# Patient Record
Sex: FEMALE | Race: WHITE
[De-identification: names, ages, dates, MRNs, and addresses within clinical notes are randomized per-mention and may not be internally consistent; named-entity substitution may affect disease eponyms.]

---

## 2019-06-01 ENCOUNTER — HOSPITAL ENCOUNTER (EMERGENCY)
Dept: HOSPITAL 25 - ED | Age: 74
LOS: 1 days | Discharge: HOME | End: 2019-06-02
Payer: MEDICARE

## 2019-06-01 DIAGNOSIS — I71.9: ICD-10-CM

## 2019-06-01 DIAGNOSIS — K57.90: ICD-10-CM

## 2019-06-01 DIAGNOSIS — Z88.2: ICD-10-CM

## 2019-06-01 DIAGNOSIS — Z88.3: ICD-10-CM

## 2019-06-01 DIAGNOSIS — Z88.0: ICD-10-CM

## 2019-06-01 DIAGNOSIS — J43.9: ICD-10-CM

## 2019-06-01 DIAGNOSIS — Z87.891: ICD-10-CM

## 2019-06-01 DIAGNOSIS — G89.29: Primary | ICD-10-CM

## 2019-06-01 DIAGNOSIS — E78.00: ICD-10-CM

## 2019-06-01 LAB
ALBUMIN SERPL BCG-MCNC: 3.8 G/DL (ref 3.2–5.2)
ALBUMIN/GLOB SERPL: 1.8 {RATIO} (ref 1–3)
ALP SERPL-CCNC: 74 U/L (ref 34–104)
ALT SERPL W P-5'-P-CCNC: 18 U/L (ref 7–52)
ANION GAP SERPL CALC-SCNC: 6 MMOL/L (ref 2–11)
APTT PPP: 49 SECONDS (ref 26–38)
AST SERPL-CCNC: 16 U/L (ref 13–39)
BASOPHILS # BLD AUTO: 0.1 10^3/UL (ref 0–0.2)
BUN SERPL-MCNC: 20 MG/DL (ref 6–24)
BUN/CREAT SERPL: 25.6 (ref 8–20)
CALCIUM SERPL-MCNC: 9 MG/DL (ref 8.6–10.3)
CHLORIDE SERPL-SCNC: 104 MMOL/L (ref 101–111)
EOSINOPHIL # BLD AUTO: 0.3 10^3/UL (ref 0–0.6)
GLOBULIN SER CALC-MCNC: 2.1 G/DL (ref 2–4)
GLUCOSE SERPL-MCNC: 102 MG/DL (ref 70–100)
HCO3 SERPL-SCNC: 28 MMOL/L (ref 22–32)
HCT VFR BLD AUTO: 36 % (ref 35–47)
HGB BLD-MCNC: 11.9 G/DL (ref 12–16)
INR PPP/BLD: 1.11 (ref 0.82–1.09)
LYMPHOCYTES # BLD AUTO: 1.6 10^3/UL (ref 1–4.8)
MCH RBC QN AUTO: 30 PG (ref 27–31)
MCHC RBC AUTO-ENTMCNC: 34 G/DL (ref 31–36)
MCV RBC AUTO: 89 FL (ref 80–97)
MONOCYTES # BLD AUTO: 0.8 10^3/UL (ref 0–0.8)
NEUTROPHILS # BLD AUTO: 4.1 10^3/UL (ref 1.5–7.7)
NRBC # BLD AUTO: 0 10^3/UL
NRBC BLD QL AUTO: 0.1
PLATELET # BLD AUTO: 255 10^3/UL (ref 150–450)
POTASSIUM SERPL-SCNC: 4.3 MMOL/L (ref 3.5–5)
PROT SERPL-MCNC: 5.9 G/DL (ref 6.4–8.9)
RBC # BLD AUTO: 4.02 10^6 /UL (ref 3.7–4.87)
SODIUM SERPL-SCNC: 138 MMOL/L (ref 135–145)
TROPONIN I SERPL-MCNC: 0 NG/ML (ref ?–0.04)
WBC # BLD AUTO: 6.8 10^3/UL (ref 3.5–10.8)

## 2019-06-01 PROCEDURE — 85025 COMPLETE CBC W/AUTO DIFF WBC: CPT

## 2019-06-01 PROCEDURE — 84484 ASSAY OF TROPONIN QUANT: CPT

## 2019-06-01 PROCEDURE — 85730 THROMBOPLASTIN TIME PARTIAL: CPT

## 2019-06-01 PROCEDURE — 99284 EMERGENCY DEPT VISIT MOD MDM: CPT

## 2019-06-01 PROCEDURE — 83690 ASSAY OF LIPASE: CPT

## 2019-06-01 PROCEDURE — 36415 COLL VENOUS BLD VENIPUNCTURE: CPT

## 2019-06-01 PROCEDURE — 80053 COMPREHEN METABOLIC PANEL: CPT

## 2019-06-01 PROCEDURE — 71275 CT ANGIOGRAPHY CHEST: CPT

## 2019-06-01 PROCEDURE — 85610 PROTHROMBIN TIME: CPT

## 2019-06-01 PROCEDURE — 83605 ASSAY OF LACTIC ACID: CPT

## 2019-06-01 PROCEDURE — 74174 CTA ABD&PLVS W/CONTRAST: CPT

## 2019-06-01 NOTE — ED
Progress





- Progress Note


Progress Note: 


This patient is a sign-out from Dr. Gregory to Dr. Jorge at 2000 on 6/1/19 at 

shift change pending CTA chest/A/P and disposition.





Course/Dx





- Course


Course Of Treatment: This patient is a sign-out from Dr. Gregory to Dr. Jorge at 

2000 on 6/1/19 at shift change pending CTA chest/A/P and disposition.  CT chest 

revealed 1. No aortic dissection, aneurysm, or rupture. 2. No pulmonary emboli. 

Findings which in the proper clinical setting can be seen in pulmonary 

hypertension. 3. Mild emphysema with lower lobe findings of UIP or NSIP pattern 

interstitial lung disease. Dr. Jorge has reviewed this radiology report. CT A/

P revealed 1. Infrarenal fusiform aortic aneurysm. No rupture. 2. Distal 

colonic diverticulosis. Dr. Jorge has reviewed this radiology report.  Patient 

will be discharged home with dx of chronic pain. Patient understands and agrees 

with this plan.





- Diagnoses


Provider Diagnoses: 


 Chronic pain








Discharge





- Sign-Out/Discharge


Documenting (check all that apply): Patient Departure - Discharge


Patient Received Moderate/Deep Sedation with Procedure: No





- Discharge Plan


Condition: Stable


Disposition: HOME


Patient Education Materials:  Chronic Pain (ED)


Referrals: 


Lucy Felix MD [Primary Care Provider] - 


Additional Instructions: 


Your CT scan did not show an aneurysm or anything worrisome as a cause of your 

pain. You are on a number of pain medications already, so I am reluctant to 

intervene in your regimen.





- Billing Disposition and Condition


Condition: STABLE


Disposition: Home





- Attestation Statements


Document Initiated by Aster: Yes


Documenting Scribe: William Muse


Provider For Whom Aster is Documenting (Include Credential): Russell Jorge MD


Scribgiovani Attestation: 


William DODGE, scribed for Russell Jorge MD on 06/03/19 at 0222. 


Scribe Documentation Reviewed: Yes


Provider Attestation: 


The documentation as recorded by the William alonzo accurately reflects 

the service I personally performed and the decisions made by me, Russell Jorge MD


Status of Scrtoñito Document: Viewed

## 2019-06-01 NOTE — ED
Back Pain





- HPI Summary


HPI Summary: 





This patient is a 74 year old F brought in by ambulance to [facility] from [facility] living with a chief complaint of progressively worsening thoracic and 

lumber back pain for the past two weeks that differs from her chronic back 

pain. Patient additionally reports dizziness. She reports three falls in the 

past two months. She reports chronic back pain from a previous spinal surgery 

in August of 2018. She is currently seen  the pain clinic. She additionally 

reports a history of Abdominal Aortic Aneurysm. Reports previous bowel 

obstruction. 





- History of Current Complaint


Chief Complaint: EDBackInjuryPain


Stated Complaint: BACK PAIN PER EMS


Time Seen by Provider: 06/01/19 19:50


Hx Obtained From: Patient


Onset/Duration: Gradual Onset, Lasting Weeks


Onset/Duration: Started Weeks Ago


Timing: Constant


Back Pain Location: Is Diffuse - thoracic and lumbar pain


Pain Intensity: 10


Pain Scale Used: 0-10 Numeric


Associated Signs And Symptoms: Positive: Other - dizziness





- Allergies/Home Medications


Allergies/Adverse Reactions: 


 Allergies











Allergy/AdvReac Type Severity Reaction Status Date / Time


 


cephalexin Allergy  Unknown Verified 06/01/19 20:09





   Reaction  





   Details  


 


Penicillins Allergy  Unknown Verified 06/01/19 20:09





   Reaction  





   Details  


 


Sulfa (Sulfonamide Allergy  Unknown Verified 06/01/19 20:09





Antibiotics)   Reaction  





   Details  














PMH/Surg Hx/FS Hx/Imm Hx


Cardiovascular History: Reports: Hx Hypercholesterolemia, Other Cardiovascular 

Problems/Disorders - AAA per patient


Musculoskeletal History: Reports: Hx Arthritis - R.A., Hx Back Problems





- Surgical History


Surgery Procedure, Year, and Place: Back surgery x2


Infectious Disease History: No


Infectious Disease History: 


   Denies: Traveled Outside the US in Last 30 Days





- Family History


Known Family History: 


   Negative: Seizure Disorder





- Social History


Alcohol Use: None


Substance Use Type: Reports: None


Smoking Status (MU): Former Smoker





Review of Systems


Positive: Myalgia


Positive: Other - dizziness


All Other Systems Reviewed And Are Negative: Yes





Physical Exam





- Summary


Physical Exam Summary: 





GENERAL: Patient is a well-developed and nourished F who is lying comfortable 

in the stretcher. Patient is not in any acute respiratory distress.


HEAD AND FACE: Normocephalic


EYES: PERRLA, EOMI x 2.


EARS: Hearing grossly intact.


MOUTH: Oropharynx within normal limits.


NECK: Supple, trachea is midline, no adenopathy, no JVD, no carotid bruit.


CHEST: Symmetric, no tenderness at palpation


LUNGS: Clear to auscultation bilaterally. No wheezing or crackles.


CVS: Regular rate and rhythm, S1 and S2 present, no murmurs or gallops 

appreciated.


ABDOMEN: Soft,  Tenderness to palpation diffusely,  no rebound or guarding . 

Bowel sounds are normal. No abnormal abdominal pulsations.


EXTREMITIES: Full ROM in all major joints, no edema, no cyanosis or clubbing.


NEURO: Alert and oriented x 3. No acute neurological deficits. Speech is normal 

and follows commands.


SKIN: Dry and warm





Triage Information Reviewed: Yes


Vital Signs On Initial Exam: 


 Initial Vitals











Temp Pulse Resp BP Pulse Ox


 


 98.6 F   77   16   117/80   92 


 


 06/01/19 19:43  06/01/19 19:43  06/01/19 19:43  06/01/19 19:43  06/01/19 19:43











Vital Signs Reviewed: Yes





Diagnostics





- Vital Signs


 Vital Signs











  Temp Pulse Resp BP Pulse Ox


 


 06/01/19 19:43  98.6 F  77  16  117/80  92














- Laboratory


Result Diagrams: 


 06/01/19 20:17





 06/01/19 20:17


Lab Statement: Any lab studies that have been ordered have been reviewed, and 

results considered in the medical decision making process.





Back Pain Course/Dx





- Course


Course Of Treatment: 74 year old F brought in by ambulance to [facility] from 

[facility] with a chief complaint of progressively worsening 

thoracic and lumber back pain for the past two weeks that differs from her 

chronic back pain. Patient additionally reports dizziness. She additionally 

reports a history of Abdominal Aortic Aneurysm. Patient is given IV fluids. 

Bloodwork obtained. Patient is signed out to Dr. Jorge pending Chest/A/P CT.





- Diagnoses


Provider Diagnoses: 


 Chronic pain








Discharge





- Sign-Out/Discharge


Documenting (check all that apply): Sign-Out Patient


Signing out patient TO: Russell Jorge - CT A/P


Patient Received Moderate/Deep Sedation with Procedure: No





- Discharge Plan


Condition: Stable


Disposition: HOME


Patient Education Materials:  Chronic Pain (ED)


Referrals: 


Lucy Felix MD [Primary Care Provider] - 


Additional Instructions: 


Your CT scan did not show an aneurysm or anything worrisome as a cause of your 

pain. You are on a number of pain medications already, so I am reluctant to 

intervene in your regimen.





- Billing Disposition and Condition


Condition: STABLE


Disposition: Home





- Attestation Statements


Document Initiated by Scribe: Yes


Documenting Scribe: Gabrielle Angeles


Provider For Whom Scribe is Documenting (Include Credential): Mary Gregory MD


Scribe Attestation: 


IGabrielle, scribed for Mary Gregory MD on 06/04/19 at 1249. 


Scribe Documentation Reviewed: Yes


Provider Attestation: 


The documentation as recorded by the Gabrielle alonzo accurately reflects 

the service I personally performed and the decisions made by me, Mary Gregory MD


Status of Scribe Document: Viewed

## 2019-06-02 VITALS — DIASTOLIC BLOOD PRESSURE: 71 MMHG | SYSTOLIC BLOOD PRESSURE: 134 MMHG

## 2019-07-28 ENCOUNTER — HOSPITAL ENCOUNTER (INPATIENT)
Dept: HOSPITAL 25 - ED | Age: 74
LOS: 6 days | Discharge: HOME | DRG: 193 | End: 2019-08-03
Attending: INTERNAL MEDICINE | Admitting: HOSPITALIST
Payer: MEDICARE

## 2019-07-28 DIAGNOSIS — Z93.3: ICD-10-CM

## 2019-07-28 DIAGNOSIS — J44.9: ICD-10-CM

## 2019-07-28 DIAGNOSIS — I95.9: ICD-10-CM

## 2019-07-28 DIAGNOSIS — M06.9: ICD-10-CM

## 2019-07-28 DIAGNOSIS — I25.10: ICD-10-CM

## 2019-07-28 DIAGNOSIS — M25.50: ICD-10-CM

## 2019-07-28 DIAGNOSIS — I25.2: ICD-10-CM

## 2019-07-28 DIAGNOSIS — E66.9: ICD-10-CM

## 2019-07-28 DIAGNOSIS — Z79.899: ICD-10-CM

## 2019-07-28 DIAGNOSIS — Z87.891: ICD-10-CM

## 2019-07-28 DIAGNOSIS — E03.9: ICD-10-CM

## 2019-07-28 DIAGNOSIS — J18.1: Primary | ICD-10-CM

## 2019-07-28 DIAGNOSIS — Z88.0: ICD-10-CM

## 2019-07-28 DIAGNOSIS — R01.1: ICD-10-CM

## 2019-07-28 DIAGNOSIS — I42.1: ICD-10-CM

## 2019-07-28 DIAGNOSIS — F32.9: ICD-10-CM

## 2019-07-28 DIAGNOSIS — M19.90: ICD-10-CM

## 2019-07-28 DIAGNOSIS — G89.29: ICD-10-CM

## 2019-07-28 DIAGNOSIS — Z82.49: ICD-10-CM

## 2019-07-28 DIAGNOSIS — Z88.8: ICD-10-CM

## 2019-07-28 DIAGNOSIS — I10: ICD-10-CM

## 2019-07-28 DIAGNOSIS — Z88.2: ICD-10-CM

## 2019-07-28 DIAGNOSIS — J96.01: ICD-10-CM

## 2019-07-28 DIAGNOSIS — F41.9: ICD-10-CM

## 2019-07-28 LAB
BASOPHILS # BLD AUTO: 0.1 10^3/UL (ref 0–0.2)
EOSINOPHIL # BLD AUTO: 0.1 10^3/UL (ref 0–0.6)
HCT VFR BLD AUTO: 34 % (ref 35–47)
HGB BLD-MCNC: 10.8 G/DL (ref 12–16)
INR PPP/BLD: 1.05 (ref 0.82–1.09)
LYMPHOCYTES # BLD AUTO: 0.6 10^3/UL (ref 1–4.8)
MCH RBC QN AUTO: 27 PG (ref 27–31)
MCHC RBC AUTO-ENTMCNC: 32 G/DL (ref 31–36)
MCV RBC AUTO: 82 FL (ref 80–97)
MONOCYTES # BLD AUTO: 0.6 10^3/UL (ref 0–0.8)
NEUTROPHILS # BLD AUTO: 7.7 10^3/UL (ref 1.5–7.7)
NRBC # BLD AUTO: 0 10^3/UL
NRBC BLD QL AUTO: 0
PLATELET # BLD AUTO: 238 10^3/UL (ref 150–450)
RBC # BLD AUTO: 4.07 10^6 /UL (ref 3.7–4.87)
WBC # BLD AUTO: 9.1 10^3/UL (ref 3.5–10.8)

## 2019-07-28 PROCEDURE — 87641 MR-STAPH DNA AMP PROBE: CPT

## 2019-07-28 PROCEDURE — 83605 ASSAY OF LACTIC ACID: CPT

## 2019-07-28 PROCEDURE — 80048 BASIC METABOLIC PNL TOTAL CA: CPT

## 2019-07-28 PROCEDURE — 93005 ELECTROCARDIOGRAM TRACING: CPT

## 2019-07-28 PROCEDURE — 99285 EMERGENCY DEPT VISIT HI MDM: CPT

## 2019-07-28 PROCEDURE — 82803 BLOOD GASES ANY COMBINATION: CPT

## 2019-07-28 PROCEDURE — 71046 X-RAY EXAM CHEST 2 VIEWS: CPT

## 2019-07-28 PROCEDURE — 87899 AGENT NOS ASSAY W/OPTIC: CPT

## 2019-07-28 PROCEDURE — 36415 COLL VENOUS BLD VENIPUNCTURE: CPT

## 2019-07-28 PROCEDURE — 80053 COMPREHEN METABOLIC PANEL: CPT

## 2019-07-28 PROCEDURE — 71275 CT ANGIOGRAPHY CHEST: CPT

## 2019-07-28 PROCEDURE — 86140 C-REACTIVE PROTEIN: CPT

## 2019-07-28 PROCEDURE — 85025 COMPLETE CBC W/AUTO DIFF WBC: CPT

## 2019-07-28 PROCEDURE — 84484 ASSAY OF TROPONIN QUANT: CPT

## 2019-07-28 PROCEDURE — 85610 PROTHROMBIN TIME: CPT

## 2019-07-28 PROCEDURE — 87040 BLOOD CULTURE FOR BACTERIA: CPT

## 2019-07-29 LAB
ALBUMIN SERPL BCG-MCNC: 3.7 G/DL (ref 3.2–5.2)
ALBUMIN/GLOB SERPL: 1.5 {RATIO} (ref 1–3)
ALP SERPL-CCNC: 99 U/L (ref 34–104)
ALT SERPL W P-5'-P-CCNC: 13 U/L (ref 7–52)
ANION GAP SERPL CALC-SCNC: 11 MMOL/L (ref 2–11)
ANION GAP SERPL CALC-SCNC: 6 MMOL/L (ref 2–11)
AST SERPL-CCNC: 15 U/L (ref 13–39)
BASOPHILS # BLD AUTO: 0.1 10^3/UL (ref 0–0.2)
BUN SERPL-MCNC: 22 MG/DL (ref 6–24)
BUN SERPL-MCNC: 24 MG/DL (ref 6–24)
BUN/CREAT SERPL: 26.2 (ref 8–20)
BUN/CREAT SERPL: 27 (ref 8–20)
CALCIUM SERPL-MCNC: 8.2 MG/DL (ref 8.6–10.3)
CALCIUM SERPL-MCNC: 9.3 MG/DL (ref 8.6–10.3)
CHLORIDE SERPL-SCNC: 104 MMOL/L (ref 101–111)
CHLORIDE SERPL-SCNC: 105 MMOL/L (ref 101–111)
EOSINOPHIL # BLD AUTO: 0.1 10^3/UL (ref 0–0.6)
GLOBULIN SER CALC-MCNC: 2.4 G/DL (ref 2–4)
GLUCOSE SERPL-MCNC: 129 MG/DL (ref 70–100)
GLUCOSE SERPL-MCNC: 145 MG/DL (ref 70–100)
HCO3 SERPL-SCNC: 22 MMOL/L (ref 22–32)
HCO3 SERPL-SCNC: 23 MMOL/L (ref 22–32)
HCT VFR BLD AUTO: 29 % (ref 35–47)
HGB BLD-MCNC: 9.2 G/DL (ref 12–16)
LYMPHOCYTES # BLD AUTO: 0.5 10^3/UL (ref 1–4.8)
MCH RBC QN AUTO: 26 PG (ref 27–31)
MCHC RBC AUTO-ENTMCNC: 32 G/DL (ref 31–36)
MCV RBC AUTO: 81 FL (ref 80–97)
MONOCYTES # BLD AUTO: 0.6 10^3/UL (ref 0–0.8)
NEUTROPHILS # BLD AUTO: 8.4 10^3/UL (ref 1.5–7.7)
NRBC # BLD AUTO: 0 10^3/UL
NRBC BLD QL AUTO: 0
PLATELET # BLD AUTO: 212 10^3/UL (ref 150–450)
POTASSIUM SERPL-SCNC: 4.3 MMOL/L (ref 3.5–5)
POTASSIUM SERPL-SCNC: 4.4 MMOL/L (ref 3.5–5)
PROT SERPL-MCNC: 6.1 G/DL (ref 6.4–8.9)
RBC # BLD AUTO: 3.5 10^6 /UL (ref 3.7–4.87)
SODIUM SERPL-SCNC: 134 MMOL/L (ref 135–145)
SODIUM SERPL-SCNC: 137 MMOL/L (ref 135–145)
WBC # BLD AUTO: 9.6 10^3/UL (ref 3.5–10.8)

## 2019-07-29 RX ADMIN — GABAPENTIN SCH MG: 300 CAPSULE ORAL at 20:16

## 2019-07-29 RX ADMIN — FAMOTIDINE SCH MG: 20 TABLET, FILM COATED ORAL at 20:16

## 2019-07-29 RX ADMIN — LEVOTHYROXINE SODIUM SCH MCG: 112 TABLET ORAL at 05:09

## 2019-07-29 RX ADMIN — WATER SCH NOTE: 100 INJECTION, SOLUTION INTRAVENOUS at 20:22

## 2019-07-29 RX ADMIN — DULOXETINE HYDROCHLORIDE SCH MG: 20 CAPSULE, DELAYED RELEASE ORAL at 10:03

## 2019-07-29 RX ADMIN — POTASSIUM CHLORIDE SCH MEQ: 750 TABLET, FILM COATED, EXTENDED RELEASE ORAL at 20:16

## 2019-07-29 RX ADMIN — Medication SCH TAB: at 20:16

## 2019-07-29 RX ADMIN — ATORVASTATIN CALCIUM SCH MG: 40 TABLET, FILM COATED ORAL at 10:03

## 2019-07-29 RX ADMIN — PANTOPRAZOLE SODIUM SCH MG: 40 TABLET, DELAYED RELEASE ORAL at 10:02

## 2019-07-29 RX ADMIN — GABAPENTIN SCH MG: 300 CAPSULE ORAL at 14:56

## 2019-07-29 RX ADMIN — METOPROLOL TARTRATE SCH MG: 25 TABLET, FILM COATED ORAL at 20:16

## 2019-07-29 RX ADMIN — ACETAMINOPHEN PRN MG: 325 TABLET ORAL at 14:57

## 2019-07-29 RX ADMIN — POTASSIUM CHLORIDE SCH MEQ: 750 TABLET, FILM COATED, EXTENDED RELEASE ORAL at 10:03

## 2019-07-29 RX ADMIN — Medication SCH TAB: at 10:02

## 2019-07-29 RX ADMIN — ASPIRIN SCH MG: 81 TABLET, COATED ORAL at 10:03

## 2019-07-29 RX ADMIN — LIDOCAINE SCH PATCH: 50 PATCH CUTANEOUS at 10:02

## 2019-07-29 RX ADMIN — FOLIC ACID SCH MG: 1 TABLET ORAL at 10:02

## 2019-07-29 RX ADMIN — MENTHOL, METHYL SALICYLATE SCH APPLIC: 10; 15 CREAM TOPICAL at 11:25

## 2019-07-29 RX ADMIN — GABAPENTIN SCH MG: 300 CAPSULE ORAL at 10:03

## 2019-07-29 RX ADMIN — HEPARIN SODIUM SCH UNITS: 5000 INJECTION INTRAVENOUS; SUBCUTANEOUS at 20:23

## 2019-07-29 RX ADMIN — MENTHOL, METHYL SALICYLATE SCH: 10; 15 CREAM TOPICAL at 12:26

## 2019-07-29 RX ADMIN — HYDROXYCHLOROQUINE SULFATE SCH MG: 200 TABLET, FILM COATED ORAL at 10:03

## 2019-07-29 NOTE — HP
CC:  Dr. Felix; Dr. Wolfe

 

HISTORY AND PHYSICAL:

 

DATE OF ADMISSION:  19

 

TIME OF EVALUATION:  12:10 a.m.

 

PRIMARY CARE PROVIDER:  Dr. Felix.

 

RHEUMATOLOGIST:  Dr. Wolfe.

 

CHIEF COMPLAINT:  "I was coughing up blood."

 

HISTORY OF PRESENT ILLNESS:  Mrs. Gage is a 74-year-old lady with a past 
medical history of rheumatoid arthritis; osteoarthritis; coronary artery disease
, status post MI; hypertension; hypothyroidism, who presents to the emergency 
room with complaints of worsening cough and yellowish sputum mixed with blood.

 

The patient states she was in her usual state of health until two weeks ago 
when she started to experience dry cough.  She states "everyone at [place] is 
sick and coughing up everywhere."  She states that initially she had only a dry 
cough and was not taking any medications for it.  Over the past couple of days, 
she started to have some malaise, some weakness and today this cough became 
productive with yellow/greenish sputum mixed with blood.  She states that this 
has happened to her in the past when she was in [place] and she was diagnosed 
with pneumonia and treated with antibiotics.

 

She denies chest pain, palpitations, shortness of breath, nausea, vomiting, 
diarrhea.  Although, she denies shortness of breath in the emergency room, she 
did have an oxygen saturation in the high 80s and required supplemental oxygen.

 

PAST MEDICAL HISTORY:

1.  Rheumatoid arthritis.

2.  Osteoarthritis.

3.  Coronary artery disease, status post MI.

4.  Hypothyroidism.

5.  Hypertension.

 

ALLERGIES:  CEPHALEXIN, PENICILLIN, and SULFA, the patient has hives and 
shortness of breath.

 

FAMILY HISTORY:  Both parents had a history of heart disease.  Her father had 
bypass and pacemaker and both  in their 80s.

 

SOCIAL HISTORY:  The patient was a smoker from age 15 to 68 of half a pack a 
day. She says that then she smoked small cigars, but she has completely quit 
for the past 6 years.  She denies any history of alcohol or drug use.  She used 
to live in [place], but then she moved to [place] to be near her son.  Her son 
is her surrogate decision maker, Robert Gage, phone number is [phone].

 

REVIEW OF SYSTEMS:  A 14-point review of systems was performed and all the 
pertinent negatives are in the HPI.

 

                               PHYSICAL EXAMINATION

 

GENERAL:  An obese elderly lady, lying in the ED stretcher, in no acute 
distress.

 

VITAL SIGNS:  Temperature 98.7, heart rate is 88, respiratory rate is 17, 
oxygen saturation is 92% on 2 L of nasal cannula, blood pressure is 97/54.

 

HEENT:  Pupils are equal.  Moist mucous membranes.

 

CHEST:  Breath sounds present bilaterally with crackles on the right.

 

CVS:  Normal S1, S2.

 

ABDOMEN:  Obese, soft, nontender, nondistended.  Bowel sounds are present.

 

EXTREMITIES:  No edema.

 

NEURO:  She is alert, awake and oriented x3.  Able to move all four extremities.

 

 DIAGNOSTIC STUDIES/LAB DATA:  The patient had a CBC that showed WBC of 9.1, 
hemoglobin of 10.8, hematocrit of 34, platelets of 238 with 84% neutrophils.  
INR is 1.05.  ABG showed a pH of 7.39, pCO2 of 37, pO2 of 58, bicarb of 23, 
oxygen saturation 91% on room air.  Chemistry showed sodium of 147, potassium 
4.3, chloride of 104, bicarb of 22, BUN of 24, creatinine of 0.89 with a 
glucose of 129, lactic acid is 3.1, calcium is 9.3.  LFTs were normal.  CRP is 
13.8.

 

Chest x-ray is not yet officially read.  The patient appears to have an 
infiltrate on the right mid lower lobe.

 

ASSESSMENT AND PLAN:  Mrs. Gage is a 74-year-old female with a past medical 
history of rheumatoid arthritis, osteoarthritis, coronary artery disease, 
hypertension, hypothyroidism, who presented to the emergency room with 
complaints of cough and hemoptysis, found to have community-acquired pneumonia.

 

1.  Community-acquired pneumonia.  The patient does not meet SIRS criteria at 
this time, but she clearly has an infection as demonstrated on her chest x-ray.
  She is immunosuppressed with her rheumatoid arthritis and on methotrexate as 
an outpatient what inhibits her immune response, so she may not be able to 
mount a response to meet sepsis criteria.  She had an one episode of 
hypotension with a blood pressure of 78/53 in the emergency room and I believe 
this is probably secondary to a misread on the machine as shortly after she had 
a blood pressure of 103/60.  Her lactic acid is elevated at 3.1, so she will 
receive her fluid bolus and we will monitor her vital signs.  She states that 
she had shortness of breath and the rash with cephalosporins and penicillin, so 
she will be continued on levofloxacin at this time.  I will send Legionella and 
Pneumococcal antigen.  Blood cultures were sent in the emergency room.  As the 
patient has hypoxia and hemoptysis, I am also going to check a CT of the chest 
to better delineate her infiltrate and to rule out pulmonary embolism.

2.  Acute hypoxemic respiratory failure secondary to pneumonia.  The patient 
lives alone.  History of tobacco abuse.  We will continue supplemental oxygen 
and she does not appear to require any bronchodilators at this time.

3.  Hypertension.  Her blood pressure is on the lower side at this time.  We 
will continue her antihypertensives with holding parameters.

4.  Hypothyroidism.  We will continue on levothyroxine.

5.  Rheumatoid arthritis.  We will continue hydroxychloroquine, but we will 
hold methotrexate for now.

6.  DVT prophylaxis:  The patient has a score of 3 on the DVT Prophylaxis Risk 
Assessment Guide.  We will hold off on pharmacological prophylaxis for now due 
to her hemoptysis and she will have SCDs.  As her hemoptysis improve, 
pharmacological prophylaxis can be considered.

7.  Code status was discussed with the patient and she wishes to be a full 
code. TIME SPENT:  Approximately 55 minutes was spent with the patient's review
, medical records review, physical examination to complete this admission, more 
than half of this time was spent face-to-face with the patient and coordination 
of care.

 

 

 

[id number]/CPS #: [id number]

JUSTIN

## 2019-07-29 NOTE — PN
Hospitalist Progress Note


Date of Service: 07/29/19





Subjective and Objective data per resident note for which I supervised.





Attending Assessment and Plan:


74F PMH RA on MTX/Plaquenil, chronic pain on Butrans, anxeity and depression on 

benzo, HTN, hypothyroid, CAD s/p distant MI, heart murmur with recent Echo 

showing Left Ventricular Hypertrophy w DYNAMIC LVOT who presented with cough 

found to have RLL PNA, hypoxic resp failure, elevated lactic acid


Hospital course c/b mild hypotension





#CAP:


-Levaquin day 2/7-10 on 7/29/2019


-Allergy to Cephalosporins


-Continue to monitor





#Hypoxic Resp failure: New O2 requirement





#Hypotension: Ongoing, repsonsive to gentle fluids


-Dose reduce metoprolol


-DC HCTZ


-Hold Verapmil 180mg SR





#Elevated Lactic Acid, initially improved then increased with hypotension again


-Trend





#Chronic pain: Tylenol, topical, add Buprenorphine low dose oral daily to 

prevent w/ drawal from d/c patch


-Consider pred burst to help from rheum component





#RA: Continue plaquenil, reasonable to hold MTX given active infxn





#HTN: see Hypotension above





#Hypothyroid: home dose





#Anxiety/Depression: home meds





#DVT PPX: Held for hemptysis, will resume today with [facility]





#Code Status: Full

## 2019-07-29 NOTE — ED
Shortness of Breath





- HPI Summary


HPI Summary: 





Patient with history of COPD and immunosuppression complains of chills, sweats, 

productive cough, fatigue, mild SOB 1 week.  2 episodes of bright red blood 

with coughing today.  Denies fever, CP, N/V/D, abdominal pain, change in urine, 

change in BM.  Patient is from [place] in assisted living.  Denies recent 

hospitalization in the past 90 days.  EMS states patient went down to 84% O2 

sats on room air, and was placed on oxygen.  Medical history is COPD, colostomy

, greatest.





- History of Current Complaint


Chief Complaint: EDBleedingDisorder


Time Seen by Provider: 07/28/19 22:56


Hx Obtained From: Patient


Onset/Duration: Gradual Onset, Lasting Days


Current Severity: Moderate


Aggravating Factors: Movement


Alleviating Factors: Oxygen


Associated Signs & Symptoms: Cough (Productive), Cough (Bloody Sputum), Chills





- Allergy/Home Medications


Allergies/Adverse Reactions: 


 Allergies











Allergy/AdvReac Type Severity Reaction Status Date / Time


 


cephalexin Allergy  Unknown Verified 07/28/19 22:49





   Reaction  





   Details  


 


Penicillins Allergy  Unknown Verified 07/28/19 22:49





   Reaction  





   Details  


 


Sulfa (Sulfonamide Allergy  Unknown Verified 07/28/19 22:49





Antibiotics)   Reaction  





   Details  











Home Medications: 


 Home Medications





Albuterol Sulfate Hfa 2 puff INH QID PRN 07/28/19 [History Confirmed 07/28/19]











PMH/Surg Hx/FS Hx/Imm Hx


Endocrine/Hematology History: 


   Denies: Hx Diabetes


Cardiovascular History: Reports: Hx Hypercholesterolemia, Hx Hypertension, 

Other Cardiovascular Problems/Disorders - AAA per patient


Respiratory History: Reports: Hx Chronic Obstructive Pulmonary Disease (COPD)


 History: 


   Denies: Hx Renal Disease


Musculoskeletal History: Reports: Hx Arthritis - R.A., Hx Back Problems, Hx 

Osteoporosis


Sensory History: 


   Denies: Hx Legally Blind


Opthamlomology History: 


   Denies: Hx Eye Prosthesis


EENT History: 


   Denies: Hx Deafness


Neurological History: 


   Denies: Hx Developmental Delay


Psychiatric History: 


   Denies: Hx Autism





- Surgical History


Surgery Procedure, Year, and Place: Back surgery x2


Infectious Disease History: No


Infectious Disease History: 


   Denies: Traveled Outside the [place] in Last 30 Days





- Family History


Known Family History: 


   Negative: Seizure Disorder





- Social History


Alcohol Use: None


Substance Use Type: Reports: None


Smoking Status (MU): Former Smoker





Review of Systems


Positive: Chills, Fatigue


Eyes: Negative


ENT: Negative


Cardiovascular: Negative


Positive: Shortness Of Breath, Cough


Gastrointestinal: Negative


Genitourinary: Negative


Musculoskeletal: Negative


Skin: Negative


Neurological: Negative


Psychological: Normal


All Other Systems Reviewed And Are Negative: Yes





Physical Exam


Triage Information Reviewed: Yes


Vital Signs On Initial Exam: 


 Initial Vitals











Temp Pulse Resp BP Pulse Ox


 


 98 F   88   20   113/65   94 


 


 07/28/19 22:43  07/28/19 22:43  07/28/19 22:43  07/28/19 22:43  07/28/19 22:43











Vital Signs Reviewed: Yes


Appearance: Positive: Well-Appearing


Skin: Positive: Warm


Head/Face: Positive: Normal Head/Face Inspection


Eyes: Positive: Normal


ENT: Positive: Normal ENT inspection


Neck: Positive: Supple


Respiratory/Lung Sounds: Positive: Decreased Breath Sounds


Cardiovascular: Positive: RRR, Murmur


Abdomen Description: Positive: Nontender


Musculoskeletal: Positive: Normal


Neurological: Positive: Normal


Psychiatric: Positive: Normal


AVPU Assessment: Alert





- Claudia Coma Scale


Best Eye Response: 4 - Spontaneous


Best Motor Response: 6 - Obeys Commands


Best Verbal Response: 5 - Oriented


Coma Scale Total: 15





Diagnostics





- Vital Signs


 Vital Signs











  Temp Pulse Resp BP Pulse Ox


 


 07/28/19 23:19   86  17  106/63  84


 


 07/28/19 23:17   90  23   89


 


 07/28/19 23:01   88  12   91


 


 07/28/19 22:46   89   113/65  90


 


 07/28/19 22:44   92   


 


 07/28/19 22:43  98 F  88  20  113/65  94














- Laboratory


Lab Results: 


 Lab Results











  07/28/19 07/28/19 07/28/19 Range/Units





  23:35 23:37 23:37 


 


WBC   9.1   (3.5-10.8)  10^3/uL


 


RBC   4.07   (3.70-4.87)  10^6 /uL


 


Hgb   10.8 L   (12.0-16.0)  g/dL


 


Hct   34 L   (35-47)  %


 


MCV   82   (80-97)  fL


 


MCH   27   (27-31)  pg


 


MCHC   32   (31-36)  g/dL


 


RDW   17 H   (10-15)  %


 


Plt Count   238   (150-450)  10^3/uL


 


MPV   8.8   (7.4-10.4)  fL


 


Neut % (Auto)   84.6   %


 


Lymph % (Auto)   6.2   %


 


Mono % (Auto)   6.9   %


 


Eos % (Auto)   1.4   %


 


Baso % (Auto)   0.9   %


 


Absolute Neuts (auto)   7.7   (1.5-7.7)  10^3/ul


 


Absolute Lymphs (auto)   0.6 L   (1.0-4.8)  10^3/ul


 


Absolute Monos (auto)   0.6   (0-0.8)  10^3/ul


 


Absolute Eos (auto)   0.1   (0-0.6)  10^3/ul


 


Absolute Basos (auto)   0.1   (0-0.2)  10^3/ul


 


Absolute Nucleated RBC   0.0   10^3/ul


 


Nucleated RBC %   0.0   


 


INR (Anticoag Therapy)    1.05  (0.82-1.09)  


 


ABG pH  7.39    (7.35-7.45)  


 


ABG pCO2  37    (35-45)  mmHg


 


ABG pO2  58 L*    ()  mmHg


 


ABG HCO3  23.0    (19-31)  mmol/L


 


ABG O2 Saturation  91.0 L    (94.0-98.0)  %


 


ABG Base Excess  -2.2 L    (-2.0-2.0)  mmol/L











Result Diagrams: 


 07/28/19 23:37





 07/28/19 23:37


Lab Statement: Any lab studies that have been ordered have been reviewed, and 

results considered in the medical decision making process.





Course/Dx





- Course


Course Of Treatment: Patient with history of COPD and immunosuppression 

complains of chills, sweats, productive cough, fatigue, mild SOB 1 week.  2 

episodes of bright red blood with coughing today.  Denies fever, CP, N/V/D, 

abdominal pain, change in urine, change in BM.  Patient is from [place] in 

assisted living.  Denies recent hospitalization in the past 90 days.  EMS 

states patient went down to 84% O2 sats on room air, and was placed on oxygen.  

Medical history is COPD, colostomy, graves,.  87% on room air.  Vital signs 

otherwise within normal limits.  Initial BP within normal limits, later dropped 

to 90/58.  Patient started on normal saline with improvement of BP.  Hgb 10.8.  

Chest x-ray positive for right-sided infiltrate.  Started on Levaquin here in 

the ED.  Admitted to hospitalist.





- Diagnoses


Provider Diagnoses: 


 Pneumonia, Hypoxia








Discharge





- Sign-Out/Discharge


Documenting (check all that apply): Patient Departure


Patient Received Moderate/Deep Sedation with Procedure: No





- Discharge Plan


Condition: Stable


Disposition: ADMITTED TO [facility]


Referrals: 


Lucy Felix MD [Primary Care Provider] - 





- Billing Disposition and Condition


Condition: STABLE


Disposition: Admitted to [facility]

## 2019-07-29 NOTE — PN
Subjective


Date of Service: 07/29/19


Interval History: 





Patient complained of pain on her back and joints which she used to have due to 

rheumatoid arthritis. She was on pain patch before and was asking for it here. 

She complained of central chest pain on and off, lasting seconds, no palpitation

, no light headiness associated. She denied any past history of heart disease. 





Objective


Active Medications: 








Acetaminophen (Tylenol Tab*)  650 mg PO Q6H PRN


   PRN Reason: mild pain/fever>100.4


Acetaminophen/Codeine Phosphate (Tylenol/Codeine 30 Mg Tab*)  2 tab PO BID PRN


   PRN Reason: Moderate Pain


Al Hydrox/Mg Hydrox/Simethicone (Maalox Plus*)  5 ml PO Q4H PRN


   PRN Reason: HEARTBURN


Albuterol (Ventolin Hfa Inhaler*)  2 puff INH QID PRN


   PRN Reason: SHORTNESS OF BREATH


Albuterol/Ipratropium (Duoneb (Albuterol 2.5 Mg/Ipratropium 0.5 Mg))  1 neb INH 

Q4H PRN


   PRN Reason: SOB/WHEEZING


Alprazolam (Xanax Tab*)  0.25 mg PO BID PRN


   PRN Reason: ANXIETY


Aspirin (Aspirin Ec Tab*)  81 [facility]ILY [facility]


Atorvastatin Calcium (Lipitor*)  40 mg PO DAILY [facility]


Calcium/Vitamin D (Oscal D Tab 250/125*)  1 tab PO BID [facility]


Cyclobenzaprine HCl (Flexeril Tab*)  5 mg PO BID PRN


   PRN Reason: SPASMS


Duloxetine HCl (Cymbalta Cap*)  20 mg PO DAILY [facility]


Famotidine (Pepcid Tab*)  20 mg PO BEDTIME [facility]; Protocol


Folic Acid (Folvite Tab*)  1 mg PO DAILY [facility]


Gabapentin (Neurontin Cap(*))  300 mg PO TID [facility]


Hydroxychloroquine Sulfate (Plaquenil Tab*)  200 mg PO DAILY [facility]


Levofloxacin/Dextrose (Levaquin 750 Mg Ivpremix(*))  750 mg in 150 mls @ 100 mls

/hr IVPB Q24H [facility]; Protocol


Levothyroxine Sodium (Synthroid Tab*)  112 mcg PO DAILY@0600 [facility]


   Last Admin: 07/29/19 05:09 Dose:  112 mcg


Lidocaine (Lidoderm 5% Patch*)  1 patch TRANSDERM DAILY [facility]


Metoprolol Tartrate (Lopressor Tab*)  50 mg PO BID [facility]


Multi-Ingredient Liniment/Rub (Oracio Wolf*)  1 applic TOPICAL TID [facility]


Pantoprazole Sodium (Protonix Tab*)  40 mg PO DAILY [facility]


Pharmacy Profile Note (Lidocaine Patch Remove*)  1 note N/A 2100 [facility]


Potassium Chloride (Klor Con Er Tab*)  10 meq PO BID [facility]


Prochlorperazine Edisylate (Compazine Inj*)  5 mg IV Q6H PRN


   PRN Reason: NAUSEA/VOMITING


Verapamil HCl (Calan Sr Cap*)  180 mg PO DAILY [facility]








 Vital Signs - 8 hr











  07/29/19 07/29/19 07/29/19





  01:46 01:48 02:00


 


Temperature  98.7 F 


 


Pulse Rate 93  91


 


Respiratory 19  20





Rate   


 


Blood Pressure 115/56  





(mmHg)   


 


O2 Sat by Pulse 90  90





Oximetry   














  07/29/19 07/29/19 07/29/19





  02:17 02:47 03:00


 


Temperature   


 


Pulse Rate 91 91 87


 


Respiratory 19 21 20





Rate   


 


Blood Pressure 104/62  





(mmHg)   


 


O2 Sat by Pulse 89 90 90





Oximetry   














  07/29/19 07/29/19 07/29/19





  03:16 03:47 03:54


 


Temperature   98.9 F


 


Pulse Rate 89 87 89


 


Respiratory 21 19 19





Rate   


 


Blood Pressure 115/61 99/54 99/54





(mmHg)   


 


O2 Sat by Pulse 90 89 91





Oximetry   














  07/29/19 07/29/19 07/29/19





  04:01 04:27 07:15


 


Temperature  98.4 F 98.2 F


 


Pulse Rate 88 90 85


 


Respiratory 21 20 20





Rate   


 


Blood Pressure  105/50 111/50





(mmHg)   


 


O2 Sat by Pulse 90 94 95





Oximetry   














  07/29/19





  07:50


 


Temperature 


 


Pulse Rate 


 


Respiratory 20





Rate 


 


Blood Pressure 





(mmHg) 


 


O2 Sat by Pulse 





Oximetry 











Oxygen Devices in Use Now: Nasal Cannula


Exam: 





heart S1 S2, holysystolic murmur loudest on right sternal edge


Lung: bibasal crackles, right side crackles prominent in both middle and lower 

lobes


Abdomen: colostomy bag noted, soft, non tender.


Lower exemity: no swelling


Result Diagrams: 


 07/29/19 02:57





 07/29/19 02:57


Additional Lab and Data: 


 Lab Results











  07/28/19 07/28/19 07/28/19 Range/Units





  23:35 23:37 23:37 


 


WBC   9.1   (3.5-10.8)  10^3/uL


 


RBC   4.07   (3.70-4.87)  10^6 /uL


 


Hgb   10.8 L   (12.0-16.0)  g/dL


 


Hct   34 L   (35-47)  %


 


MCV   82   (80-97)  fL


 


MCH   27   (27-31)  pg


 


MCHC   32   (31-36)  g/dL


 


RDW   17 H   (10-15)  %


 


Plt Count   238   (150-450)  10^3/uL


 


MPV   8.8   (7.4-10.4)  fL


 


Neut % (Auto)   84.6   %


 


Lymph % (Auto)   6.2   %


 


Mono % (Auto)   6.9   %


 


Eos % (Auto)   1.4   %


 


Baso % (Auto)   0.9   %


 


Absolute Neuts (auto)   7.7   (1.5-7.7)  10^3/ul


 


Absolute Lymphs (auto)   0.6 L   (1.0-4.8)  10^3/ul


 


Absolute Monos (auto)   0.6   (0-0.8)  10^3/ul


 


Absolute Eos (auto)   0.1   (0-0.6)  10^3/ul


 


Absolute Basos (auto)   0.1   (0-0.2)  10^3/ul


 


Absolute Nucleated RBC   0.0   10^3/ul


 


Nucleated RBC %   0.0   


 


INR (Anticoag Therapy)    1.05  (0.82-1.09)  


 


ABG pH  7.39    (7.35-7.45)  


 


ABG pCO2  37    (35-45)  mmHg


 


ABG pO2  58 L*    ()  mmHg


 


ABG HCO3  23.0    (19-31)  mmol/L


 


ABG O2 Saturation  91.0 L    (94.0-98.0)  %


 


ABG Base Excess  -2.2 L    (-2.0-2.0)  mmol/L














Assess/Plan/Problems-Billing


Assessment: 





Ms. Gage is a 74 years old lady with a background of rheumatoid arthritis, 

osteoarthritis, coronary artery disease, status post MI, hypertension, 

hypothjyroidism, who presented to ED for worsening cough and hemoptysis. With 

her symptoms of cough and hemoptysis, her elevated WBC and CRP,   infiltrate on 

right middle and lower lobe in CXR, she is certainly having a community 

acquired pneumonia. She didn't have any fever, hypotension, kidney injury at 

this moment,  although lactate is midly elevated, her penumonia is considering 

mid-moderate. But again, this could be due to her immunosuppressant state so 

she couldn't mount SIRS response as normal people. We are treating for CAP with 

levofloxacin in view of allergy, and will monitor how she responds to this 

treatment. 








- Patient Problems


(1) Community acquired pneumonia


Current Visit: Yes   Status: Acute   Code(s): J18.9 - PNEUMONIA, UNSPECIFIED 

ORGANISM   SNOMED Code(s): 991434807


   Comment: Continue iv levofloxacin


continue O2 nasal canula, try to wean down when possible.


monitor antibiotic response


trace strep peumonia, legionella result   





(2) Rheumatoid arthritis


Current Visit: Yes   Status: Acute   Code(s): M06.9 - RHEUMATOID ARTHRITIS, 

UNSPECIFIED   SNOMED Code(s): 84858182


   Comment: on MTX and hydroxychloroquine, we hold off Methotrexate for now due 

to infection.


Lidocaine patch and Oracio-Wolf patches for her arthritic pain.   





(3) Hypertension


Current Visit: Yes   Status: Acute   Code(s): I10 - ESSENTIAL (PRIMARY) 

HYPERTENSION   SNOMED Code(s): 11788069


   Comment: - old med include hydrochlothiazide, verapamil, metoprolol for 

hypertension


- bp lowish since admission, stop hydrochlothiazide, verapamil, decrease 

metoprol to 25mg bid for now


- put on NS for bp and hydration purpose


- watch bp closely   





(4) Hypothyroidism


Current Visit: Yes   Status: Acute   Code(s): E03.9 - HYPOTHYROIDISM, 

UNSPECIFIED   SNOMED Code(s): 37394351


   Comment: Continue old dose of levothyroidism   





(5) Osteoarthritis


Current Visit: Yes   Status: Acute   Code(s): M19.90 - UNSPECIFIED 

OSTEOARTHRITIS, UNSPECIFIED SITE   SNOMED Code(s): 720972994


   Comment: not well controlled pain despite multiple med, see details under 

the problem joint pain    





(6) Coronary artery disease


Current Visit: Yes   Status: Acute   Code(s): I25.10 - ATHSCL HEART DISEASE OF 

NATIVE CORONARY ARTERY W/O ANG PCTRS   SNOMED Code(s): 24050781


   Comment: on aspirin, statin, metoprolol   





(7) Joint pain


Current Visit: Yes   Status: Acute   Code(s): M25.50 - PAIN IN UNSPECIFIED 

JOINT   SNOMED Code(s): 59424496


   Comment: old pain med: ibuprofen 400mg q8h, acetaminophen/codeine 30mg tab, 

buprenorhine 10mcg patch, gabapentin, duolexetine


- add on diclofenac gel and lidocaine patch since buprenorhine patch not 

available in hospital


- continue ibuprofen and acetaminophen/codeine, gabapentin for now


- consider heat/ice compression for joint pain   


Status and Disposition: 





Still inpatient medicine





Attestation


Documenting Resident: Amanda Portillo


Supervising Physician: Elisabeth Aguirre


Attestation: 


This service has been performed in part by a resident under the direction of a 

teaching physician.I, Elisabeth Aguirre, performed the service, or was physically 

present during the critical, or key portions of the service, furnished by the 

resident. I participated in the management of the patient.

## 2019-07-30 LAB
ANION GAP SERPL CALC-SCNC: 9 MMOL/L (ref 2–11)
BASOPHILS # BLD AUTO: 0.1 10^3/UL (ref 0–0.2)
BUN SERPL-MCNC: 17 MG/DL (ref 6–24)
BUN/CREAT SERPL: 25.4 (ref 8–20)
CALCIUM SERPL-MCNC: 9 MG/DL (ref 8.6–10.3)
CHLORIDE SERPL-SCNC: 105 MMOL/L (ref 101–111)
EOSINOPHIL # BLD AUTO: 0.3 10^3/UL (ref 0–0.6)
GLUCOSE SERPL-MCNC: 112 MG/DL (ref 70–100)
HCO3 SERPL-SCNC: 23 MMOL/L (ref 22–32)
HCT VFR BLD AUTO: 27 % (ref 35–47)
HGB BLD-MCNC: 9 G/DL (ref 12–16)
LYMPHOCYTES # BLD AUTO: 0.8 10^3/UL (ref 1–4.8)
MCH RBC QN AUTO: 27 PG (ref 27–31)
MCHC RBC AUTO-ENTMCNC: 33 G/DL (ref 31–36)
MCV RBC AUTO: 82 FL (ref 80–97)
MONOCYTES # BLD AUTO: 0.7 10^3/UL (ref 0–0.8)
NEUTROPHILS # BLD AUTO: 8 10^3/UL (ref 1.5–7.7)
NRBC # BLD AUTO: 0 10^3/UL
NRBC BLD QL AUTO: 0
PLATELET # BLD AUTO: 197 10^3/UL (ref 150–450)
POTASSIUM SERPL-SCNC: 4 MMOL/L (ref 3.5–5)
RBC # BLD AUTO: 3.35 10^6 /UL (ref 3.7–4.87)
SODIUM SERPL-SCNC: 137 MMOL/L (ref 135–145)
WBC # BLD AUTO: 9.9 10^3/UL (ref 3.5–10.8)

## 2019-07-30 RX ADMIN — GABAPENTIN SCH MG: 300 CAPSULE ORAL at 13:20

## 2019-07-30 RX ADMIN — DULOXETINE HYDROCHLORIDE SCH MG: 20 CAPSULE, DELAYED RELEASE ORAL at 09:58

## 2019-07-30 RX ADMIN — FAMOTIDINE SCH MG: 20 TABLET, FILM COATED ORAL at 21:31

## 2019-07-30 RX ADMIN — LIDOCAINE 4% SCH APPLIC: 4 GEL TOPICAL at 21:35

## 2019-07-30 RX ADMIN — GABAPENTIN SCH MG: 300 CAPSULE ORAL at 21:31

## 2019-07-30 RX ADMIN — LEVOTHYROXINE SODIUM SCH MCG: 112 TABLET ORAL at 05:47

## 2019-07-30 RX ADMIN — FOLIC ACID SCH MG: 1 TABLET ORAL at 10:02

## 2019-07-30 RX ADMIN — ACETAMINOPHEN PRN MG: 325 TABLET ORAL at 11:30

## 2019-07-30 RX ADMIN — GABAPENTIN SCH MG: 300 CAPSULE ORAL at 09:58

## 2019-07-30 RX ADMIN — LEVOFLOXACIN SCH MLS/HR: 5 INJECTION, SOLUTION INTRAVENOUS at 00:36

## 2019-07-30 RX ADMIN — METOPROLOL TARTRATE SCH: 50 TABLET, FILM COATED ORAL at 18:07

## 2019-07-30 RX ADMIN — LIDOCAINE 4% SCH APPLIC: 4 GEL TOPICAL at 09:58

## 2019-07-30 RX ADMIN — ALPRAZOLAM PRN MG: 0.25 TABLET ORAL at 11:30

## 2019-07-30 RX ADMIN — METOPROLOL TARTRATE SCH MG: 25 TABLET, FILM COATED ORAL at 09:58

## 2019-07-30 RX ADMIN — HEPARIN SODIUM SCH UNITS: 5000 INJECTION INTRAVENOUS; SUBCUTANEOUS at 13:20

## 2019-07-30 RX ADMIN — LIDOCAINE SCH PATCH: 50 PATCH CUTANEOUS at 09:57

## 2019-07-30 RX ADMIN — ACETAMINOPHEN AND CODEINE PHOSPHATE PRN TAB: 300; 30 TABLET ORAL at 04:00

## 2019-07-30 RX ADMIN — ASPIRIN SCH MG: 81 TABLET, COATED ORAL at 09:58

## 2019-07-30 RX ADMIN — ATORVASTATIN CALCIUM SCH MG: 40 TABLET, FILM COATED ORAL at 09:58

## 2019-07-30 RX ADMIN — PANTOPRAZOLE SODIUM SCH MG: 40 TABLET, DELAYED RELEASE ORAL at 09:58

## 2019-07-30 RX ADMIN — POTASSIUM CHLORIDE SCH MEQ: 750 TABLET, FILM COATED, EXTENDED RELEASE ORAL at 21:30

## 2019-07-30 RX ADMIN — Medication SCH TAB: at 21:31

## 2019-07-30 RX ADMIN — WATER SCH NOTE: 100 INJECTION, SOLUTION INTRAVENOUS at 21:35

## 2019-07-30 RX ADMIN — HYDROXYCHLOROQUINE SULFATE SCH MG: 200 TABLET, FILM COATED ORAL at 09:58

## 2019-07-30 RX ADMIN — Medication SCH TAB: at 09:58

## 2019-07-30 RX ADMIN — ACETAMINOPHEN AND CODEINE PHOSPHATE PRN TAB: 300; 30 TABLET ORAL at 21:38

## 2019-07-30 RX ADMIN — HEPARIN SODIUM SCH UNITS: 5000 INJECTION INTRAVENOUS; SUBCUTANEOUS at 21:39

## 2019-07-30 RX ADMIN — POTASSIUM CHLORIDE SCH MEQ: 750 TABLET, FILM COATED, EXTENDED RELEASE ORAL at 09:58

## 2019-07-30 RX ADMIN — HEPARIN SODIUM SCH UNITS: 5000 INJECTION INTRAVENOUS; SUBCUTANEOUS at 05:46

## 2019-07-30 RX ADMIN — LIDOCAINE 4% SCH APPLIC: 4 GEL TOPICAL at 13:21

## 2019-07-30 NOTE — PN
Subjective


Date of Service: 07/30/19


Interval History: 





Patient complained of pain up to 7-8/10 overnight, mainly on bilateral shoulder 

and left knee. He also complained of sleeplessness overnight as her neighbours 

were screaming the whole night. 


She felt better in breathing. Able to ambulate by herself without difficulties. 





Objective


Active Medications: 








Acetaminophen (Tylenol Tab*)  650 mg PO Q6H PRN


   PRN Reason: mild pain/fever>100.4


   Last Admin: 07/30/19 11:30 Dose:  650 mg


Acetaminophen/Codeine Phosphate (Tylenol/Codeine 30 Mg Tab*)  2 tab PO BID PRN


   PRN Reason: Moderate Pain


   Last Admin: 07/30/19 04:00 Dose:  2 tab


Al Hydrox/Mg Hydrox/Simethicone (Maalox Plus*)  5 ml PO Q4H PRN


   PRN Reason: HEARTBURN


Albuterol (Ventolin Hfa Inhaler*)  2 puff INH QID PRN


   PRN Reason: SHORTNESS OF BREATH


Albuterol/Ipratropium (Duoneb (Albuterol 2.5 Mg/Ipratropium 0.5 Mg))  1 neb INH 

Q4H PRN


   PRN Reason: SOB/WHEEZING


Alprazolam (Xanax Tab*)  0.25 mg PO BID PRN


   PRN Reason: ANXIETY


   Last Admin: 07/30/19 11:30 Dose:  0.25 mg


Aspirin (Aspirin Ec Tab*)  81 mg PO DAILY [facility]


   Last Admin: 07/30/19 09:58 Dose:  81 mg


Atorvastatin Calcium (Lipitor*)  40 mg PO DAILY [facility]


   Last Admin: 07/30/19 09:58 Dose:  40 mg


Buprenorphine (Butrans 10 Mcg Patch(Nf))  10 mcg TRANSDERM Q7D [facility]


   Last Admin: 07/29/19 20:17 Dose:  10 mcg


Calcium/Vitamin D (Oscal D Tab 250/125*)  1 tab PO BID [facility]


   Last Admin: 07/30/19 09:58 Dose:  1 tab


Cyclobenzaprine HCl (Flexeril Tab*)  5 mg PO BID PRN


   PRN Reason: SPASMS


Duloxetine HCl (Cymbalta Cap*)  20 mg PO DAILY [facility]


   Last Admin: 07/30/19 09:58 Dose:  20 mg


Famotidine (Pepcid Tab*)  20 mg PO BEDTIME [facility]; Protocol


   Last Admin: 07/29/19 20:16 Dose:  20 mg


Folic Acid (Folvite Tab*)  1 mg PO DAILY [facility]


   Last Admin: 07/30/19 10:02 Dose:  1 mg


Gabapentin (Neurontin Cap(*))  300 mg PO TID [facility]


   Last Admin: 07/30/19 13:20 Dose:  300 mg


Heparin Sodium (Porcine) (Heparin Vial(*))  5,000 units SUBCUT Q8HR [facility]


   Last Admin: 07/30/19 13:20 Dose:  5,000 units


Hydroxychloroquine Sulfate (Plaquenil Tab*)  200 mg PO DAILY [facility]


   Last Admin: 07/30/19 09:58 Dose:  200 mg


Levofloxacin/Dextrose (Levaquin 750 Mg Ivpremix(*))  750 mg in 150 mls @ 100 mls

/hr IVPB Q24H [facility]; Protocol


   Last Admin: 07/30/19 00:36 Dose:  100 mls/hr


Levothyroxine Sodium (Synthroid Tab*)  112 mcg PO DAILY@0600 [facility]


   Last Admin: 07/30/19 05:47 Dose:  112 mcg


Lidocaine (Lidoderm 5% Patch*)  1 patch TRANSDERM DAILY [facility]


   Last Admin: 07/30/19 09:57 Dose:  1 patch


Lidocaine (Topicaine 4% Gel*)  1 applic TOPICAL TID [facility]


   Last Admin: 07/30/19 13:21 Dose:  1 applic


Metoprolol Tartrate (Lopressor Tab*)  25 mg PO BID [facility]


   Last Admin: 07/30/19 09:58 Dose:  25 mg


Pantoprazole Sodium (Protonix Tab*)  40 mg PO DAILY [facility]


   Last Admin: 07/30/19 09:58 Dose:  40 mg


Pharmacy Profile Note (Lidocaine Patch Remove*)  1 note N/A 2100 [facility]


   Last Admin: 07/29/19 20:22 Dose:  1 note


Pharmacy Profile Note (Buprenorph Patch Check Q Shift)  1 note FOLLOW UP 0700,

1900 [facility]


   Last Admin: 07/30/19 06:52 Dose:  1 note


Potassium Chloride (Klor Con Er Tab*)  10 meq PO BID [facility]


   Last Admin: 07/30/19 09:58 Dose:  10 meq


Prochlorperazine Edisylate (Compazine Inj*)  5 mg IV Q6H PRN


   PRN Reason: NAUSEA/VOMITING


   Last Admin: 07/30/19 11:31 Dose:  5 mg








 Vital Signs - 8 hr











  07/30/19 07/30/19 07/30/19





  07:47 08:00 09:58


 


Temperature 99.3 F  


 


Pulse Rate 97  


 


Respiratory 14 14 19





Rate   


 


Blood Pressure 134/60  





(mmHg)   


 


O2 Sat by Pulse 95  





Oximetry   














  07/30/19 07/30/19 07/30/19





  10:49 11:30 11:58


 


Temperature 99.1 F  


 


Pulse Rate 110  


 


Respiratory 22 19 17





Rate   


 


Blood Pressure 157/80  





(mmHg)   


 


O2 Sat by Pulse 99  





Oximetry   














  07/30/19 07/30/19





  13:20 13:30


 


Temperature  


 


Pulse Rate  


 


Respiratory 16 16





Rate  


 


Blood Pressure  





(mmHg)  


 


O2 Sat by Pulse  





Oximetry  











Oxygen Devices in Use Now: Nasal Cannula


Exam: 





Patient is well, alert.





heart: normal S1 S2


lung: decreased crackles on right lower zone compared with yesterday, left 

basal crackles same as yesterday.


abdomen: soft, non tender.


LL: no swelling





Result Diagrams: 


 07/30/19 05:59





 07/30/19 05:59





Assess/Plan/Problems-Billing


Assessment: 











- Patient Problems


(1) Community acquired pneumonia


Current Visit: Yes   Status: Acute   Code(s): J18.9 - PNEUMONIA, UNSPECIFIED 

ORGANISM   SNOMED Code(s): 833499827


   Comment: Continue iv levofloxacin, plan for 7 days in total


wean down O2 nasal canula, keep spO2> 92%


trace strep peumonia, legionella result   





(2) Rheumatoid arthritis


Current Visit: Yes   Status: Acute   Code(s): M06.9 - RHEUMATOID ARTHRITIS, 

UNSPECIFIED   SNOMED Code(s): 70089810


   Comment: on MTX and hydroxychloroquine, we hold off Methotrexate for now due 

to infection.   





(3) Hypertension


Current Visit: Yes   Status: Acute   Code(s): I10 - ESSENTIAL (PRIMARY) 

HYPERTENSION   SNOMED Code(s): 61518690


   Comment: - old med include hydrochlothiazide, verapamil, metoprolol for 

hypertension


- bp lowish since admission, stop hydrochlothiazide, verapamil, decrease 

metoprol to 25mg bid for now


- put on NS for bp and hydration purpose


- watch bp closely   





(4) Hypothyroidism


Current Visit: Yes   Status: Acute   Code(s): E03.9 - HYPOTHYROIDISM, 

UNSPECIFIED   SNOMED Code(s): 77901886


   Comment: Continue old dose of levothyroidism   





(5) Osteoarthritis


Current Visit: Yes   Status: Acute   Code(s): M19.90 - UNSPECIFIED 

OSTEOARTHRITIS, UNSPECIFIED SITE   SNOMED Code(s): 907589103


   Comment: not well controlled pain despite multiple med, see details under 

the problem joint pain    





(6) Coronary artery disease


Current Visit: Yes   Status: Acute   Code(s): I25.10 - ATHSCL HEART DISEASE OF 

NATIVE CORONARY ARTERY W/O ANG PCTRS   SNOMED Code(s): 50404307


   Comment: on aspirin, statin, metoprolol   





(7) Joint pain


Current Visit: Yes   Status: Acute   Code(s): M25.50 - PAIN IN UNSPECIFIED 

JOINT   SNOMED Code(s): 51595183


   Comment: old pain med: ibuprofen 400mg q8h, acetaminophen/codeine 30mg tab, 

buprenorhine 10mcg patch, gabapentin, duolexetine


- add on diclofenac gel, lidocaine patch and also lidocaine gel


- continue ibuprofen and acetaminophen/codeine, gabapentin for now


- consider heat/ice compression for joint pain   


Status and Disposition: 





Inpatient medicine for now.


Plan for discharge home once able to wean off O2





Attestation


Documenting Resident: Amanda Portillo


Supervising Physician: Elisabeth Aguirre


Attestation: 


This service has been performed in part by a resident under the direction of a 

teaching physician.I, Elisabeth Aguirre, performed the service, or was physically 

present during the critical, or key portions of the service, furnished by the 

resident. I participated in the management of the patient.

## 2019-07-30 NOTE — PN
Hospitalist Progress Note


Date of Service: 07/30/19





HD # 3 on 7/30





Attending Assessment and Plan:


74F PMH RA on MTX/Plaquenil, chronic pain on Butrans, ostomy status from prior 

diverticulitis, anxeity and depression on benzo, HTN, hypothyroid, CAD s/p 

distant MI, heart murmur with recent Echo showing Left Ventricular Hypertrophy 

w DYNAMIC LVOT who presented with cough found to have RLL PNA, hypoxic resp 

failure,





#CAP:


-Levaquin day 3/7 on 7/30/2019


-Allergy to Cephalosporins


-Continue to monitor, improving on exam and clinically





#Hypoxic Resp failure: New O2 requirement, weaning today





#Heart murmur: Old echo showed dynamic LVOT, continue home Verapamil and BB 

when BP is normal





#Hypotension: resolved consider restarting antiHTN





#Elevated Lactic Acid: Resolved





#Chronic pain: Tylenol, topical, Buprenorphine patch





#RA: Continue plaquenil, reasonable to hold MTX given active infxn





#HTN: see Hypotension above





#Hypothyroid: home dose





#Anxiety/Depression: home meds





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





#DVT PPX: [facility]





#Code Status: Full


#Dispo-consider d/c to home tomorrorow if O2 is weaned

## 2019-07-30 NOTE — PN
Subjective


Date of Service: 07/30/19


Interval History: 


HD # 3 on 7/30





Attending Assessment and Plan:


74F PMH RA on MTX/Plaquenil, chronic pain on Butrans, anxeity and depression on 

benzo, HTN, hypothyroid, CAD s/p distant MI, heart murmur with recent Echo 

showing Left Ventricular Hypertrophy w DYNAMIC LVOT who presented with cough 

found to have RLL PNA, hypoxic resp failure, elevated lactic acid


Hospital course c/b mild hypotension





#CAP:


-Levaquin day 2/7-10 on 7/29/2019


-Allergy to Cephalosporins


-Continue to monitor





#Hypoxic Resp failure: New O2 requirement





#Hypotension: Ongoing, repsonsive to gentle fluids


-Dose reduce metoprolol


-DC HCTZ


-Hold Verapmil 180mg SR





#Elevated Lactic Acid, initially improved then increased with hypotension again


-Trend





#Chronic pain: Tylenol, topical, add Buprenorphine patch


-Consider pred burst to help from rheum component





#RA: Continue plaquenil, reasonable to hold MTX given active infxn





#HTN: see Hypotension above





#Hypothyroid: home dose





#Anxiety/Depression: home meds





#Ostomy stat





#DVT PPX: Held for hemptysis, will resume today with [facility]





#Code Status: Full








Objective


Active Medications: 








Acetaminophen (Tylenol Tab*)  650 mg PO Q6H PRN


   PRN Reason: mild pain/fever>100.4


   Last Admin: 07/29/19 14:57 Dose:  650 mg


Acetaminophen/Codeine Phosphate (Tylenol/Codeine 30 Mg Tab*)  2 tab PO BID PRN


   PRN Reason: Moderate Pain


   Last Admin: 07/30/19 04:00 Dose:  2 tab


Al Hydrox/Mg Hydrox/Simethicone (Maalox Plus*)  5 ml PO Q4H PRN


   PRN Reason: HEARTBURN


Albuterol (Ventolin Hfa Inhaler*)  2 puff INH QID PRN


   PRN Reason: SHORTNESS OF BREATH


Albuterol/Ipratropium (Duoneb (Albuterol 2.5 Mg/Ipratropium 0.5 Mg))  1 neb INH 

Q4H PRN


   PRN Reason: SOB/WHEEZING


Alprazolam (Xanax Tab*)  0.25 mg PO BID PRN


   PRN Reason: ANXIETY


Aspirin (Aspirin Ec Tab*)  81 mg PO DAILY [facility]


   Last Admin: 07/29/19 10:03 Dose:  81 mg


Atorvastatin Calcium (Lipitor*)  40 mg PO DAILY [facility]


   Last Admin: 07/29/19 10:03 Dose:  40 mg


Buprenorphine (Butrans 10 Mcg Patch(Nf))  10 mcg TRANSDERM Q7D [facility]


   Last Admin: 07/29/19 20:17 Dose:  10 mcg


Calcium/Vitamin D (Oscal D Tab 250/125*)  1 tab PO BID [facility]


   Last Admin: 07/29/19 20:16 Dose:  1 tab


Cyclobenzaprine HCl (Flexeril Tab*)  5 mg PO BID PRN


   PRN Reason: SPASMS


Duloxetine HCl (Cymbalta Cap*)  20 mg PO DAILY [facility]


   Last Admin: 07/29/19 10:03 Dose:  20 mg


Famotidine (Pepcid Tab*)  20 mg PO BEDTIME [facility]; Protocol


   Last Admin: 07/29/19 20:16 Dose:  20 mg


Folic Acid (Folvite Tab*)  1 mg PO DAILY [facility]


   Last Admin: 07/29/19 10:02 Dose:  1 mg


Gabapentin (Neurontin Cap(*))  300 mg PO TID [facility]


   Last Admin: 07/29/19 20:16 Dose:  300 mg


Heparin Sodium (Porcine) (Heparin Vial(*))  5,000 units SUBCUT Q8HR [facility]


   Last Admin: 07/30/19 05:46 Dose:  5,000 units


Hydroxychloroquine Sulfate (Plaquenil Tab*)  200 mg PO DAILY [facility]


   Last Admin: 07/29/19 10:03 Dose:  200 mg


Levofloxacin/Dextrose (Levaquin 750 Mg Ivpremix(*))  750 mg in 150 mls @ 100 mls

/hr IVPB Q24H [facility]; Protocol


   Last Admin: 07/30/19 00:36 Dose:  100 mls/hr


Levothyroxine Sodium (Synthroid Tab*)  112 mcg PO DAILY@0600 [facility]


   Last Admin: 07/30/19 05:47 Dose:  112 mcg


Lidocaine (Lidoderm 5% Patch*)  1 patch TRANSDERM DAILY [facility]


   Last Admin: 07/29/19 10:02 Dose:  1 patch


Lidocaine (Topicaine 4% Gel*)  1 applic TOPICAL TID [facility]


Metoprolol Tartrate (Lopressor Tab*)  25 mg PO BID [facility]


   Last Admin: 07/29/19 20:16 Dose:  25 mg


Pantoprazole Sodium (Protonix Tab*)  40 mg PO DAILY [facility]


   Last Admin: 07/29/19 10:02 Dose:  40 mg


Pharmacy Profile Note (Lidocaine Patch Remove*)  1 note N/A 2100 [facility]


   Last Admin: 07/29/19 20:22 Dose:  1 note


Pharmacy Profile Note (Buprenorph Patch Check Q Shift)  1 note FOLLOW UP 0700,

1900 [facility]


   Last Admin: 07/30/19 06:52 Dose:  1 note


Potassium Chloride (Klor Con Er Tab*)  10 meq PO BID [facility]


   Last Admin: 07/29/19 20:16 Dose:  10 meq


Prochlorperazine Edisylate (Compazine Inj*)  5 mg IV Q6H PRN


   PRN Reason: NAUSEA/VOMITING








 Vital Signs - 8 hr











  07/30/19 07/30/19 07/30/19





  03:19 04:00 06:55


 


Temperature 98.4 F  


 


Pulse Rate 85  


 


Respiratory 18 20 18





Rate   


 


Blood Pressure 149/72  





(mmHg)   


 


O2 Sat by Pulse 100  





Oximetry   














  07/30/19





  07:47


 


Temperature 99.3 F


 


Pulse Rate 97


 


Respiratory 14





Rate 


 


Blood Pressure 134/60





(mmHg) 


 


O2 Sat by Pulse 95





Oximetry 











Oxygen Devices in Use Now: Nasal Cannula


Result Diagrams: 


 07/30/19 05:59





 07/30/19 05:59


Additional Lab and Data: 


 Lab Results











  07/28/19 07/28/19 07/28/19 Range/Units





  23:35 23:37 23:37 


 


WBC   9.1   (3.5-10.8)  10^3/uL


 


RBC   4.07   (3.70-4.87)  10^6 /uL


 


Hgb   10.8 L   (12.0-16.0)  g/dL


 


Hct   34 L   (35-47)  %


 


MCV   82   (80-97)  fL


 


MCH   27   (27-31)  pg


 


MCHC   32   (31-36)  g/dL


 


RDW   17 H   (10-15)  %


 


Plt Count   238   (150-450)  10^3/uL


 


MPV   8.8   (7.4-10.4)  fL


 


Neut % (Auto)   84.6   %


 


Lymph % (Auto)   6.2   %


 


Mono % (Auto)   6.9   %


 


Eos % (Auto)   1.4   %


 


Baso % (Auto)   0.9   %


 


Absolute Neuts (auto)   7.7   (1.5-7.7)  10^3/ul


 


Absolute Lymphs (auto)   0.6 L   (1.0-4.8)  10^3/ul


 


Absolute Monos (auto)   0.6   (0-0.8)  10^3/ul


 


Absolute Eos (auto)   0.1   (0-0.6)  10^3/ul


 


Absolute Basos (auto)   0.1   (0-0.2)  10^3/ul


 


Absolute Nucleated RBC   0.0   10^3/ul


 


Nucleated RBC %   0.0   


 


INR (Anticoag Therapy)    1.05  (0.82-1.09)  


 


ABG pH  7.39    (7.35-7.45)  


 


ABG pCO2  37    (35-45)  mmHg


 


ABG pO2  58 L*    ()  mmHg


 


ABG HCO3  23.0    (19-31)  mmol/L


 


ABG O2 Saturation  91.0 L    (94.0-98.0)  %


 


ABG Base Excess  -2.2 L    (-2.0-2.0)  mmol/L











Microbiology and Other Data: 


 Microbiology











 07/29/19 00:13 Aerobic Blood Culture - Preliminary





 Blood Venous    No Growth Day 1





 Anaerobic Blood Culture - Preliminary





    No Growth Day 1


 


 07/29/19 00:13 Aerobic Blood Culture - Preliminary





 Blood Venous    No Growth Day 1





 Anaerobic Blood Culture - Preliminary





    No Growth Day 1


 


 07/29/19 18:50 Legionella Urinary Antigen - Final





 Urine    Negative Legionella Antigen





 Streptococcus pneumoniae Ag Screen - Final





    Negative S. pneumo Antigen


 


 07/29/19 01:35 Nasal Screen MRSA (PCR) - Final





 Nasal    Mrsa Not Detected














Assess/Plan/Problems-Billing


Assessment: 





Ms. Gage is a 74 years old lady with a background of rheumatoid arthritis, 

osteoarthritis, coronary artery disease, status post MI, hypertension, 

hypothjyroidism, who presented to ED for worsening cough and hemoptysis. With 

her symptoms of cough and hemoptysis, her elevated WBC and CRP,   infiltrate on 

right middle and lower lobe in CXR, she is certainly having a community 

acquired pneumonia. She didn't have any fever, hypotension, kidney injury at 

this moment,  although lactate is midly elevated, her penumonia is considering 

mid-moderate. But again, this could be due to her immunosuppressant state so 

she couldn't mount SIRS response as normal people. We are treating for CAP with 

levofloxacin in view of allergy, and will monitor how she responds to this 

treatment. 





Status and Disposition: 





Still inpatient medicine

## 2019-07-31 RX ADMIN — LIDOCAINE 4% SCH APPLIC: 4 GEL TOPICAL at 11:22

## 2019-07-31 RX ADMIN — HEPARIN SODIUM SCH UNITS: 5000 INJECTION INTRAVENOUS; SUBCUTANEOUS at 20:59

## 2019-07-31 RX ADMIN — VERAPAMIL HYDROCHLORIDE SCH MG: 180 CAPSULE, EXTENDED RELEASE ORAL at 10:00

## 2019-07-31 RX ADMIN — ACETAMINOPHEN AND CODEINE PHOSPHATE PRN TAB: 300; 30 TABLET ORAL at 20:13

## 2019-07-31 RX ADMIN — Medication SCH TAB: at 09:59

## 2019-07-31 RX ADMIN — GABAPENTIN SCH MG: 300 CAPSULE ORAL at 14:50

## 2019-07-31 RX ADMIN — Medication SCH TAB: at 20:13

## 2019-07-31 RX ADMIN — ASPIRIN SCH MG: 81 TABLET, COATED ORAL at 09:57

## 2019-07-31 RX ADMIN — FAMOTIDINE SCH MG: 20 TABLET, FILM COATED ORAL at 20:13

## 2019-07-31 RX ADMIN — HYDROXYCHLOROQUINE SULFATE SCH MG: 200 TABLET, FILM COATED ORAL at 10:00

## 2019-07-31 RX ADMIN — DULOXETINE HYDROCHLORIDE SCH MG: 20 CAPSULE, DELAYED RELEASE ORAL at 09:58

## 2019-07-31 RX ADMIN — POTASSIUM CHLORIDE SCH MEQ: 750 TABLET, FILM COATED, EXTENDED RELEASE ORAL at 20:12

## 2019-07-31 RX ADMIN — LIDOCAINE SCH PATCH: 50 PATCH CUTANEOUS at 09:53

## 2019-07-31 RX ADMIN — LEVOFLOXACIN SCH MG: 750 TABLET, FILM COATED ORAL at 11:20

## 2019-07-31 RX ADMIN — POTASSIUM CHLORIDE SCH MEQ: 750 TABLET, FILM COATED, EXTENDED RELEASE ORAL at 09:58

## 2019-07-31 RX ADMIN — PANTOPRAZOLE SODIUM SCH MG: 40 TABLET, DELAYED RELEASE ORAL at 10:00

## 2019-07-31 RX ADMIN — FOLIC ACID SCH MG: 1 TABLET ORAL at 10:00

## 2019-07-31 RX ADMIN — HEPARIN SODIUM SCH UNITS: 5000 INJECTION INTRAVENOUS; SUBCUTANEOUS at 14:50

## 2019-07-31 RX ADMIN — LIDOCAINE 4% SCH: 4 GEL TOPICAL at 14:50

## 2019-07-31 RX ADMIN — LEVOTHYROXINE SODIUM SCH MCG: 112 TABLET ORAL at 05:23

## 2019-07-31 RX ADMIN — WATER SCH NOTE: 100 INJECTION, SOLUTION INTRAVENOUS at 20:59

## 2019-07-31 RX ADMIN — HEPARIN SODIUM SCH UNITS: 5000 INJECTION INTRAVENOUS; SUBCUTANEOUS at 05:23

## 2019-07-31 RX ADMIN — METOPROLOL TARTRATE SCH MG: 50 TABLET, FILM COATED ORAL at 09:59

## 2019-07-31 RX ADMIN — LIDOCAINE 4% SCH APPLIC: 4 GEL TOPICAL at 20:59

## 2019-07-31 RX ADMIN — LEVOFLOXACIN SCH MLS/HR: 5 INJECTION, SOLUTION INTRAVENOUS at 01:32

## 2019-07-31 RX ADMIN — ACETAMINOPHEN PRN MG: 325 TABLET ORAL at 04:22

## 2019-07-31 RX ADMIN — HEPARIN SODIUM SCH: 5000 INJECTION INTRAVENOUS; SUBCUTANEOUS at 20:15

## 2019-07-31 RX ADMIN — ALPRAZOLAM PRN MG: 0.25 TABLET ORAL at 11:21

## 2019-07-31 RX ADMIN — ATORVASTATIN CALCIUM SCH MG: 40 TABLET, FILM COATED ORAL at 10:01

## 2019-07-31 RX ADMIN — GABAPENTIN SCH MG: 300 CAPSULE ORAL at 20:12

## 2019-07-31 RX ADMIN — GABAPENTIN SCH MG: 300 CAPSULE ORAL at 10:00

## 2019-07-31 RX ADMIN — METOPROLOL TARTRATE SCH MG: 50 TABLET, FILM COATED ORAL at 20:13

## 2019-07-31 NOTE — PN
Subjective


Date of Service: 07/31/19


Interval History: 





Patient noticed improvement in breathing. O2 requirement was 1L during daytime 

but went up to 2L/min at night time. No more hemoptysis. 





Objective


Active Medications: 








Acetaminophen (Tylenol Tab*)  650 mg PO Q6H PRN


   PRN Reason: mild pain/fever>100.4


   Last Admin: 07/31/19 04:22 Dose:  650 mg


Acetaminophen/Codeine Phosphate (Tylenol/Codeine 30 Mg Tab*)  2 tab PO BID PRN


   PRN Reason: Moderate Pain


   Last Admin: 07/30/19 21:38 Dose:  2 tab


Al Hydrox/Mg Hydrox/Simethicone (Maalox Plus*)  5 ml PO Q4H PRN


   PRN Reason: HEARTBURN


Albuterol (Ventolin Hfa Inhaler*)  2 puff INH QID PRN


   PRN Reason: SHORTNESS OF BREATH


Albuterol/Ipratropium (Duoneb (Albuterol 2.5 Mg/Ipratropium 0.5 Mg))  1 neb INH 

Q4H PRN


   PRN Reason: SOB/WHEEZING


Alprazolam (Xanax Tab*)  0.25 mg PO BID PRN


   PRN Reason: ANXIETY


   Last Admin: 07/31/19 11:21 Dose:  0.25 mg


Aspirin (Aspirin Ec Tab*)  81 mg PO DAILY [facility]


   Last Admin: 07/31/19 09:57 Dose:  81 mg


Atorvastatin Calcium (Lipitor*)  40 mg PO DAILY [facility]


   Last Admin: 07/31/19 10:01 Dose:  40 mg


Buprenorphine (Butrans 10 Mcg Patch(Nf))  10 mcg TRANSDERM Q7D [facility]


   Last Admin: 07/29/19 20:17 Dose:  10 mcg


Calcium/Vitamin D (Oscal D Tab 250/125*)  1 tab PO BID [facility]


   Last Admin: 07/31/19 09:59 Dose:  1 tab


Cyclobenzaprine HCl (Flexeril Tab*)  5 mg PO BID PRN


   PRN Reason: SPASMS


Duloxetine HCl (Cymbalta Cap*)  20 mg PO DAILY [facility]


   Last Admin: 07/31/19 09:58 Dose:  20 mg


Famotidine (Pepcid Tab*)  20 mg PO BEDTIME [facility]; Protocol


   Last Admin: 07/30/19 21:31 Dose:  20 mg


Folic Acid (Folvite Tab*)  1 mg PO DAILY [facility]


   Last Admin: 07/31/19 10:00 Dose:  1 mg


Gabapentin (Neurontin Cap(*))  300 mg PO TID [facility]


   Last Admin: 07/31/19 14:50 Dose:  300 mg


Heparin Sodium (Porcine) (Heparin Vial(*))  5,000 units SUBCUT Q8HR [facility]


   Last Admin: 07/31/19 14:50 Dose:  5,000 units


Hydroxychloroquine Sulfate (Plaquenil Tab*)  200 mg PO DAILY [facility]


   Last Admin: 07/31/19 10:00 Dose:  200 mg


Levofloxacin (Levaquin Tab*)  750 mg PO Q24H [facility]; Protocol


   Last Admin: 07/31/19 11:20 Dose:  750 mg


Levothyroxine Sodium (Synthroid Tab*)  112 mcg PO DAILY@0600 [facility]


   Last Admin: 07/31/19 05:23 Dose:  112 mcg


Lidocaine (Lidoderm 5% Patch*)  1 patch TRANSDERM DAILY [facility]


   Last Admin: 07/31/19 09:53 Dose:  1 patch


Lidocaine (Topicaine 4% Gel*)  1 applic TOPICAL TID [facility]


   Last Admin: 07/31/19 14:50 Dose:  Not Given


Metoprolol Tartrate (Lopressor Tab*)  50 mg PO BID [facility]


   Last Admin: 07/31/19 09:59 Dose:  50 mg


Pantoprazole Sodium (Protonix Tab*)  40 mg PO DAILY [facility]


   Last Admin: 07/31/19 10:00 Dose:  40 mg


Pharmacy Profile Note (Lidocaine Patch Remove*)  1 note N/A 2100 [facility]


   Last Admin: 07/30/19 21:35 Dose:  1 note


Pharmacy Profile Note (Buprenorph Patch Check Q Shift)  1 note FOLLOW UP 0700,

1900 [facility]


   Last Admin: 07/31/19 07:14 Dose:  1 note


Potassium Chloride (Klor Con Er Tab*)  10 meq PO BID [facility]


   Last Admin: 07/31/19 09:58 Dose:  10 meq


Prochlorperazine Edisylate (Compazine Inj*)  5 mg IV Q6H PRN


   PRN Reason: NAUSEA/VOMITING


   Last Admin: 07/30/19 11:31 Dose:  5 mg


Verapamil HCl (Calan Sr Cap*)  180 mg PO DAILY [facility]


   Last Admin: 07/31/19 10:00 Dose:  180 mg








 Vital Signs - 8 hr











  07/31/19 07/31/19 07/31/19





  08:00 08:10 10:00


 


Temperature  97.9 F 


 


Pulse Rate  99 


 


Respiratory 18 18 18





Rate   


 


Blood Pressure  125/57 





(mmHg)   


 


O2 Sat by Pulse  94 





Oximetry   














  07/31/19 07/31/19 07/31/19





  11:21 11:53 14:50


 


Temperature  98.7 F 


 


Pulse Rate  87 


 


Respiratory 18 24 18





Rate   


 


Blood Pressure  121/53 





(mmHg)   


 


O2 Sat by Pulse  96 





Oximetry   











Oxygen Devices in Use Now: Nasal Cannula


Exam: 





Patient is well, alert.





heart: normal S1 S2


lung:  left basal crackles, minimal right basal crackles 


abdomen: soft, non tender.


LL: no swelling





Result Diagrams: 


 07/30/19 05:59





 07/30/19 05:59





Assess/Plan/Problems-Billing


Assessment: 





Ms. Gage is a 74 years old lady with a background of rheumatoid arthritis 

on MTX and Plaquenil, osteoarthritis, coronary artery disease, status post MI, 

hypertension, hypothjyroidism, who presented to ED for worsening cough and 

hemoptysis, which was confirmed to be community acquired pneumonia. She is put 

on levofloxacin treatment in view of her multiple penicillin allergy, and 

planned for 7 days course. Her symptoms are resolving with current antibiotics. 

She would be ready for discharge once she weaned off O2. 





- Patient Problems


(1) Community acquired pneumonia


Current Visit: Yes   Status: Acute   Code(s): J18.9 - PNEUMONIA, UNSPECIFIED 

ORGANISM   SNOMED Code(s): 520721717


   Comment: Continue levofloxacin for 7 days in total


wean down O2 nasal canula, keep spO2> 92%


trace strep peumonia, legionella result   





(2) Rheumatoid arthritis


Current Visit: Yes   Status: Acute   Code(s): M06.9 - RHEUMATOID ARTHRITIS, 

UNSPECIFIED   SNOMED Code(s): 13741651


   Comment: on MTX and hydroxychloroquine, we hold off Methotrexate for now due 

to infection.   





(3) Hypertension


Current Visit: Yes   Status: Acute   Code(s): I10 - ESSENTIAL (PRIMARY) 

HYPERTENSION   SNOMED Code(s): 41435133


   Comment: - old med include hydrochlothiazide, verapamil, metoprolol for 

hypertension


- bp lowish since admission, stop hydrochlothiazide, verapamil, decrease 

metoprol to 25mg bid initially, currently resolving   





(4) Hypothyroidism


Current Visit: Yes   Status: Acute   Code(s): E03.9 - HYPOTHYROIDISM, 

UNSPECIFIED   SNOMED Code(s): 38783679


   Comment: Continue old dose of levothyroidism   





(5) Osteoarthritis


Current Visit: Yes   Status: Acute   Code(s): M19.90 - UNSPECIFIED 

OSTEOARTHRITIS, UNSPECIFIED SITE   SNOMED Code(s): 458658232


   Comment: not well controlled pain despite multiple med, see details under 

the problem joint pain    





(6) Coronary artery disease


Current Visit: Yes   Status: Acute   Code(s): I25.10 - ATHSCL HEART DISEASE OF 

NATIVE CORONARY ARTERY W/O ANG PCTRS   SNOMED Code(s): 62322488


   Comment: on aspirin, statin, metoprolol   





(7) Joint pain


Current Visit: Yes   Status: Acute   Code(s): M25.50 - PAIN IN UNSPECIFIED 

JOINT   SNOMED Code(s): 17221354


   Comment: old pain med: ibuprofen 400mg q8h, acetaminophen/codeine 30mg tab, 

buprenorhine 10mcg patch, gabapentin, duolexetine


- add on diclofenac gel, lidocaine patch and also lidocaine gel


- continue ibuprofen and acetaminophen/codeine, gabapentin for now


- consider heat/ice compression for joint pain   


Status and Disposition: 





Inpatient medicine for now.


Plan for discharge home once able to wean off O2





Attestation


Documenting Resident: Amanda Portillo


Supervising Physician: Elisabeth Aguirre


Attestation: 


This service has been performed in part by a resident under the direction of a 

teaching physician.I, Elisabeth Aguirre, performed the service, or was physically 

present during the critical, or key portions of the service, furnished by the 

resident. I participated in the management of the patient.

## 2019-07-31 NOTE — PN
Hospitalist Progress Note


Date of Service: 07/31/19





HD # 4 on 7/31





Attending Assessment and Plan:


74F PMH RA on MTX/Plaquenil, chronic pain on Butrans, ostomy status from prior 

diverticulitis, anxeity and depression on benzo, HTN, hypothyroid, CAD s/p 

distant MI, heart murmur with recent Echo showing Left Ventricular Hypertrophy 

w DYNAMIC LVOT who presented with cough found to have RLL PNA, hypoxic resp 

failure.





#CAP:


-Levaquin day 4/7 on 7/31/2019


-Allergy to Cephalosporins


-Continue to monitor, improving on exam and clinically





#Hypoxic Resp failure: New O2 requirement, weaning today, rand lbe able to d/c 

once off O2





#Heart murmur: Old echo showed dynamic LVOT, continue home Verapamil and BB 

when BP is normal





#Chronic pain: Tylenol, topical, Buprenorphine patch





#RA: Continue plaquenil, reasonable to hold MTX given active infxn





#HTN: consider restarting HCTZ





#Hypothyroid: home dose





#Anxiety/Depression: home meds





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





#DVT PPX: SQ





#Code Status: Full


#Dispo-consider d/c when O2 requirment is off

## 2019-08-01 RX ADMIN — ASPIRIN SCH MG: 81 TABLET, COATED ORAL at 09:38

## 2019-08-01 RX ADMIN — GABAPENTIN SCH MG: 300 CAPSULE ORAL at 09:38

## 2019-08-01 RX ADMIN — FAMOTIDINE SCH MG: 20 TABLET, FILM COATED ORAL at 21:03

## 2019-08-01 RX ADMIN — POTASSIUM CHLORIDE SCH MEQ: 750 TABLET, FILM COATED, EXTENDED RELEASE ORAL at 09:39

## 2019-08-01 RX ADMIN — ALPRAZOLAM PRN MG: 0.25 TABLET ORAL at 12:05

## 2019-08-01 RX ADMIN — POTASSIUM CHLORIDE SCH MEQ: 750 TABLET, FILM COATED, EXTENDED RELEASE ORAL at 21:02

## 2019-08-01 RX ADMIN — PANTOPRAZOLE SODIUM SCH MG: 40 TABLET, DELAYED RELEASE ORAL at 09:38

## 2019-08-01 RX ADMIN — HEPARIN SODIUM SCH UNITS: 5000 INJECTION INTRAVENOUS; SUBCUTANEOUS at 14:33

## 2019-08-01 RX ADMIN — GABAPENTIN SCH MG: 300 CAPSULE ORAL at 21:03

## 2019-08-01 RX ADMIN — LIDOCAINE 4% SCH APPLIC: 4 GEL TOPICAL at 09:32

## 2019-08-01 RX ADMIN — ATORVASTATIN CALCIUM SCH MG: 40 TABLET, FILM COATED ORAL at 09:39

## 2019-08-01 RX ADMIN — FOLIC ACID SCH MG: 1 TABLET ORAL at 09:39

## 2019-08-01 RX ADMIN — WATER SCH NOTE: 100 INJECTION, SOLUTION INTRAVENOUS at 21:04

## 2019-08-01 RX ADMIN — ACETAMINOPHEN PRN MG: 325 TABLET ORAL at 03:04

## 2019-08-01 RX ADMIN — METOPROLOL TARTRATE SCH MG: 50 TABLET, FILM COATED ORAL at 09:40

## 2019-08-01 RX ADMIN — LIDOCAINE 4% SCH APPLIC: 4 GEL TOPICAL at 21:02

## 2019-08-01 RX ADMIN — LIDOCAINE SCH PATCH: 50 PATCH CUTANEOUS at 09:23

## 2019-08-01 RX ADMIN — VERAPAMIL HYDROCHLORIDE SCH MG: 180 CAPSULE, EXTENDED RELEASE ORAL at 09:39

## 2019-08-01 RX ADMIN — GABAPENTIN SCH MG: 300 CAPSULE ORAL at 14:33

## 2019-08-01 RX ADMIN — HEPARIN SODIUM SCH UNITS: 5000 INJECTION INTRAVENOUS; SUBCUTANEOUS at 21:02

## 2019-08-01 RX ADMIN — HYDROXYCHLOROQUINE SULFATE SCH MG: 200 TABLET, FILM COATED ORAL at 09:39

## 2019-08-01 RX ADMIN — Medication SCH TAB: at 09:38

## 2019-08-01 RX ADMIN — DULOXETINE HYDROCHLORIDE SCH MG: 20 CAPSULE, DELAYED RELEASE ORAL at 09:39

## 2019-08-01 RX ADMIN — METOPROLOL TARTRATE SCH MG: 50 TABLET, FILM COATED ORAL at 21:02

## 2019-08-01 RX ADMIN — LIDOCAINE 4% SCH APPLIC: 4 GEL TOPICAL at 14:34

## 2019-08-01 RX ADMIN — LEVOTHYROXINE SODIUM SCH MCG: 112 TABLET ORAL at 05:55

## 2019-08-01 RX ADMIN — HEPARIN SODIUM SCH: 5000 INJECTION INTRAVENOUS; SUBCUTANEOUS at 05:54

## 2019-08-01 RX ADMIN — ACETAMINOPHEN PRN MG: 325 TABLET ORAL at 14:33

## 2019-08-01 RX ADMIN — Medication SCH TAB: at 21:03

## 2019-08-01 RX ADMIN — LEVOFLOXACIN SCH MG: 750 TABLET, FILM COATED ORAL at 09:39

## 2019-08-01 NOTE — DS
CC:  Lucy Felix MD *

 

DISCHARGE SUMMARY:

 

DATE OF ADMISSION:  07/28/19

 

DATE OF  DISCHARGE:  08/03/19

 

PRIMARY CARE PROVIDER:  Lucy Felix MD.

 

DISPOSITION AT THE TIME OF DISCHARGE:  Stable to return to prior place of 
residence which is [place].

 

PRIMARY DIAGNOSES:

1.  Right lower lobe pneumonia.

2.  Hypoxic respiratory failure.

 

SECONDARY DIAGNOSES:

1.  Rheumatoid arthritis, on methotrexate and Plaquenil.

2.  Chronic pain, on opiate pain medication.

3.  Ostomy status from prior diverticulitis.

4.  Anxiety and depression, on chronic benzodiazepines.

5.  Hypertension.

6.  Hypothyroidism.

7.  Coronary artery disease, status post distant myocardial infarction.

8.  Heart murmur, found to be from hypertrophic cardiomyopathy with dynamic 
left ventricular outflow tract obstruction.

 

MEDICATIONS AT THE TIME OF DISCHARGE:

1.  Acetaminophen-codeine 2 tabs p.o. b.i.d., codeine being 30 mg tab.

2.  Mylanta 5 mL q.4 hours p.r.n.

3.  Albuterol inhaler 2 puffs inhaled 4 times a day p.r.n.

4.  Alprazolam 0.25 mg p.o. b.i.d.

5.  Aspirin 81 mg p.o. daily.

6.  Buprenorphine patch 10 mcg q.7 days.

7.  Calcium carbonate/vitamin D3 one tab p.o. b.i.d.

8.  Cyclobenzaprine 5 mg p.o. b.i.d. p.r.n.

9.  Duloxetine 20 mg p.o. daily.

10.  Folic acid 1 mg p.o. daily.

11.  Gabapentin 300 mg p.o. t.i.d.

12.  Hydrochlorothiazide 25 mg p.o. daily.

13.  Plaquenil 200 mg p.o. daily.

14.  Levothyroxine 112 mcg p.o. daily.

15.  Prednsione 40mg PO 1 tab by mouth for 3 days after discharge

16.  Metoprolol tartrate 50 mg p.o. b.i.d.

17.  Omeprazole 20 mg p.o. daily.

18.  Potassium chloride 10 mEq p.o. b.i.d.

19.  Ranitidine 150 mg p.o. q.h.s.

20.  Verapamil  mg p.o. daily.

21.  Diclofenac gel 1 application topical t.i.d. p.r.n.

22.  Ibuprofen 400 mg p.o. q.8 hours p.r.n.

23.  Methotrexate 5 mg p.o. q.7 days.

24.  Nabumetone 500 mg p.o. t.i.d.



 

Medication changes on this hospitalization include addition of prednisone for 3 
days after discharge

 

HOSPITAL COURSE AND HISTORY OF PRESENT ILLNESS:  A 74-year-old female with the 
above past medical history who presented to the emergency room with cough and 
malaise and was found to have a right lower lobe pneumonia and new oxygen 
requirement consistent with hypoxic respiratory failure, she is admitted to the 
hospital.  Her hospital course by problems is as follows:

 

1.  Community-acquired pneumonia with right lower lobe consolidation.  The 
patient improved significantly with Levaquin.  She has significant penicillin 
and cephalexin allergies which prevented her from getting ceftriaxone and 
azithromycin. She completed 7 days in hospital of antibitoics, and can follow 
up with primary care.  She improved over the course of her stay on both exam 
and clinically.

2.  Hypoxic respiratory failure.  This is secondary to her new pneumonia.  
Diuresis was started on 08/01/19 in hopes to speed her oxygen weaning as she 
was bolused with fluids in the emergency room and possibly her heart murmur 
with left ventricular outflow obstruction caused mild accumulation of edema, 
she also was given prednsione 40mg starting 8/2 which helped immensely.  She 
was able to be titrated off of oxygen safely and was able to transfer to her 
wheelchair without desaturating. She will go home on 3 more days of prednisone 
which are prescribed ot her in the hospital

3.  Heart murmur.  Reason echocardiogram showed a dynamic left ventricular 
outflow obstruction.  She is on home verapamil and metoprolol which were 
continued and she is to follow up with primary care and cardiologist as needed.
  Significant component of heart failure was not thought to have contributed to 
her current presentation, although diuresis was started in hopes to aid her 
oxygen requirement. Decision to continue diuresis could be made at primary care 
level.

4.  Chronic pain.  Her home medications were continued.

5.  Rheumatoid arthritis.  Her methotrexate was held while in the hospital and 
can be resumed on discharge.  Plaquenil was continued.

6.  Hypertension.  Controlled on home meds.

7.  Hypothyroidism.  Her home dose was continued.

8.  Anxiety and depression.  Continued on her home meds.

9.  Ostomy status.  She had no changes or complications during this 
hospitalization.

10.  DVT prophylaxis, was done with subcu heparin during this hospitalization.  
On the day of discharge, the patient is able to transfer herself to wheelchair 
which is her baseline functional status without desaturating.  She is 
tolerating voiding freely.  Vital signs have been stable.

 

DIAGNOSTIC STUDIES/LAB DATA:  Labs done on 07/30/19 show white blood cell count 
of 9.9, hemoglobin 9, hematocrit 27, platelets of 197.  BMP shows sodium of 137
, potassium 4, chloride 105, carbon dioxide 23.  BUN 17, creatinine 0.67.  
Lactic acid 1.5.  Studies done during this hospitalization included a chest 
thorax CTA done on 07/29/19 which did not show PE; chest x-ray on 07/28/19, 
which shows pneumonia as described in the discharge summary.

 

Physical exam on the day of discharge done per progress note and notable for 
mild crackles, but improved since exam.

 

ITEMS TO FOLLOW UP ON STATUS POST DISCHARGE:

1.  Community-acquired pneumonia.  She was treated with 7 days with anitbitocs 
and has done well, she is discharged on prednisone.  This course can be 
extended if primary care provider and facility feels this is needed per IDSA 
guidelines.

2.  Heart murmur with what sounds like recent diagnosis of dynamic LVOT in the 
setting of (?) hypertrophic cardiomyopathy.  This will deserve workup for 
primary care, and metoprolol and verapamil are indicated.  We will not continue 
the patient on Lasix on discharge, as sometimes left ventricular outflow 
obstruction murmurs are volume dependent and a decision to manage volume status 
by primary care cardiologist can be done as outpatient.

 

TIME SPENT:  Forty-five minutes were spent in the planning of this discharge 
with over half of that spent directly at the bedside of the patient providing 
direct patient care.  Plan of care discussed with the patient who agrees and 
knows to return to the emergency room if any new or worsening symptoms such as 
severe shortness of breath, fevers, or chest pain.  If there are any questions 
about the care of this patient during this hospitalization, please do not 
hesitate to reach out to the hospitalist team at [facility] to answer all 
these questions.

 

 [id number]/CPS #: [id number]

MTDD

## 2019-08-01 NOTE — PN
Subjective


Date of Service: 08/01/19


Interval History: 





75 y/o PMH RA and MTX and plaquenil, chronic pain on Butrans, anxiety, 

derpression, HTN, hypothyroid, CAD dsitant MI. She found out to have hypoxia 

resp failure caused by right middle and lower zone penumonia.She was started on 

levaquin, and she contiued to imrpvoe. 





Objective


Active Medications: 








Acetaminophen (Tylenol Tab*)  650 mg PO Q6H PRN


   PRN Reason: mild pain/fever>100.4


   Last Admin: 08/01/19 14:33 Dose:  650 mg


Acetaminophen/Codeine Phosphate (Tylenol/Codeine 30 Mg Tab*)  2 tab PO BID PRN


   PRN Reason: Moderate Pain


   Last Admin: 07/31/19 20:13 Dose:  2 tab


Al Hydrox/Mg Hydrox/Simethicone (Maalox Plus*)  5 ml PO Q4H PRN


   PRN Reason: HEARTBURN


Albuterol (Ventolin Hfa Inhaler*)  2 puff INH QID PRN


   PRN Reason: SHORTNESS OF BREATH


Albuterol/Ipratropium (Duoneb (Albuterol 2.5 Mg/Ipratropium 0.5 Mg))  1 neb INH 

Q4H PRN


   PRN Reason: SOB/WHEEZING


Alprazolam (Xanax Tab*)  0.25 mg PO BID PRN


   PRN Reason: ANXIETY


   Last Admin: 08/01/19 12:05 Dose:  0.25 mg


Aspirin (Aspirin Ec Tab*)  81 mg PO DAILY [facility]


   Last Admin: 08/01/19 09:38 Dose:  81 mg


Atorvastatin Calcium (Lipitor*)  40 mg PO DAILY [facility]


   Last Admin: 08/01/19 09:39 Dose:  40 mg


Buprenorphine (Butrans 10 Mcg Patch(Nf))  10 mcg TRANSDERM Q7D [facility]


   Last Admin: 07/29/19 20:17 Dose:  10 mcg


Calcium/Vitamin D (Oscal D Tab 250/125*)  1 tab PO BID [facility]


   Last Admin: 08/01/19 09:38 Dose:  1 tab


Cyclobenzaprine HCl (Flexeril Tab*)  5 mg PO BID PRN


   PRN Reason: SPASMS


Duloxetine HCl (Cymbalta Cap*)  20 mg PO DAILY [facility]


   Last Admin: 08/01/19 09:39 Dose:  20 mg


Famotidine (Pepcid Tab*)  20 mg PO BEDTIME [facility]; Protocol


   Last Admin: 07/31/19 20:13 Dose:  20 mg


Folic Acid (Folvite Tab*)  1 mg PO DAILY [facility]


   Last Admin: 08/01/19 09:39 Dose:  1 mg


Gabapentin (Neurontin Cap(*))  300 mg PO TID [facility]


   Last Admin: 08/01/19 14:33 Dose:  300 mg


Heparin Sodium (Porcine) (Heparin Vial(*))  5,000 units SUBCUT Q8HR [facility]


   Last Admin: 08/01/19 14:33 Dose:  5,000 units


Hydrochlorothiazide (Hydrodiuril Tab*)  25 mg PO DAILY [facility]


Hydroxychloroquine Sulfate (Plaquenil Tab*)  200 mg PO DAILY [facility]


   Last Admin: 08/01/19 09:39 Dose:  200 mg


Levofloxacin (Levaquin Tab*)  750 mg PO Q24H [facility]; Protocol


   Last Admin: 08/01/19 09:39 Dose:  750 mg


Levothyroxine Sodium (Synthroid Tab*)  112 mcg PO DAILY@0600 [facility]


   Last Admin: 08/01/19 05:55 Dose:  112 mcg


Lidocaine (Lidoderm 5% Patch*)  1 patch TRANSDERM DAILY [facility]


   Last Admin: 08/01/19 09:23 Dose:  1 patch


Lidocaine (Topicaine 4% Gel*)  1 applic TOPICAL TID [facility]


   Last Admin: 08/01/19 14:34 Dose:  1 applic


Metoprolol Tartrate (Lopressor Tab*)  50 mg PO BID [facility]


   Last Admin: 08/01/19 09:40 Dose:  50 mg


Pantoprazole Sodium (Protonix Tab*)  40 mg PO DAILY [facility]


   Last Admin: 08/01/19 09:38 Dose:  40 mg


Pharmacy Profile Note (Lidocaine Patch Remove*)  1 note N/A 2100 [facility]


   Last Admin: 07/31/19 20:59 Dose:  1 note


Pharmacy Profile Note (Buprenorph Patch Check Q Shift)  1 note FOLLOW UP 0700,

1900 [facility]


   Last Admin: 08/01/19 06:59 Dose:  1 note


Potassium Chloride (Klor Con Er Tab*)  10 meq PO BID [facility]


   Last Admin: 08/01/19 09:39 Dose:  10 meq


Prochlorperazine Edisylate (Compazine Inj*)  5 mg IV Q6H PRN


   PRN Reason: NAUSEA/VOMITING


   Last Admin: 07/30/19 11:31 Dose:  5 mg


Verapamil HCl (Calan Sr Cap*)  180 mg PO DAILY [facility]


   Last Admin: 08/01/19 09:39 Dose:  180 mg








 Vital Signs - 8 hr











  08/01/19 08/01/19 08/01/19





  07:15 09:00 09:38


 


Temperature 96.9 F  


 


Pulse Rate 78  


 


Respiratory 16 18 20





Rate   


 


Blood Pressure 125/54  





(mmHg)   


 


O2 Sat by Pulse 92  





Oximetry   














  08/01/19 08/01/19 08/01/19





  11:35 12:00 12:05


 


Temperature   


 


Pulse Rate   


 


Respiratory  18 16





Rate   


 


Blood Pressure   





(mmHg)   


 


O2 Sat by Pulse 80  





Oximetry   














  08/01/19 08/01/19





  14:32 14:33


 


Temperature 99.4 F 


 


Pulse Rate  


 


Respiratory  18





Rate  


 


Blood Pressure  





(mmHg)  


 


O2 Sat by Pulse  





Oximetry  











Oxygen Devices in Use Now: Nasal Cannula


Result Diagrams: 


 07/30/19 05:59





 07/30/19 05:59





Assess/Plan/Problems-Billing


Assessment: 





Ms. Gage is a 74 years old lady with a background of rheumatoid arthritis 

on MTX and Plaquenil, osteoarthritis, coronary artery disease, status post MI, 

hypertension, hypothjyroidism, who presented to ED for worsening cough and 

hemoptysis, which was confirmed to be community acquired pneumonia. She is put 

on levofloxacin treatment in view of her multiple penicillin allergy, and 

planned for 7 days course. Her symptoms are resolving with current antibiotics. 

She would be ready for discharge once she weaned off O2. 





- Patient Problems


(1) Community acquired pneumonia


Current Visit: Yes   Status: Acute   Code(s): J18.9 - PNEUMONIA, UNSPECIFIED 

ORGANISM   SNOMED Code(s): 759188957


   Comment: Continue levofloxacin for 7 days in total


wean down O2 nasal canula, keep spO2> 92%


trace strep peumonia, legionella result   





(2) Rheumatoid arthritis


Current Visit: Yes   Status: Acute   Code(s): M06.9 - RHEUMATOID ARTHRITIS, 

UNSPECIFIED   SNOMED Code(s): 66249110


   Comment: on MTX and hydroxychloroquine, we hold off Methotrexate for now due 

to infection.   





(3) Hypertension


Current Visit: Yes   Status: Acute   Code(s): I10 - ESSENTIAL (PRIMARY) 

HYPERTENSION   SNOMED Code(s): 34703112


   Comment: - old med include hydrochlothiazide, verapamil, metoprolol for 

hypertension


- bp lowish since admission, stop hydrochlothiazide, verapamil, decrease 

metoprol to 25mg bid initially, currently resolved over   





(4) Hypothyroidism


Current Visit: Yes   Status: Acute   Code(s): E03.9 - HYPOTHYROIDISM, 

UNSPECIFIED   SNOMED Code(s): 27686034


   Comment: Continue old dose of levothyroidism   





(5) Osteoarthritis


Current Visit: Yes   Status: Acute   Code(s): M19.90 - UNSPECIFIED 

OSTEOARTHRITIS, UNSPECIFIED SITE   SNOMED Code(s): 107583961


   Comment: not well controlled pain despite multiple med, see details under 

the problem joint pain    





(6) Coronary artery disease


Current Visit: Yes   Status: Acute   Code(s): I25.10 - ATHSCL HEART DISEASE OF 

NATIVE CORONARY ARTERY W/O ANG PCTRS   SNOMED Code(s): 49476623


   Comment: on aspirin, statin, metoprolol   





(7) Joint pain


Current Visit: Yes   Status: Acute   Code(s): M25.50 - PAIN IN UNSPECIFIED 

JOINT   SNOMED Code(s): 40664663


   Comment: old pain med: ibuprofen 400mg q8h, acetaminophen/codeine 30mg tab, 

buprenorhine 10mcg patch, gabapentin, duolexetine


- add on diclofenac gel, lidocaine patch and also lidocaine gel


- continue ibuprofen and acetaminophen/codeine, gabapentin for now


- consider heat/ice compression for joint pain   


Status and Disposition: 





Inpatient medicine for now.


Plan for discharge home once able to wean off O2





Attestation


Documenting Resident: Amanda Portillo


Supervising Physician: Elisabeth Aguirre


Attestation: 


This service has been performed in part by a resident under the direction of a 

teaching physician.I, Elisabeth Aguirre, performed the service, or was physically 

present during the critical, or key portions of the service, furnished by the 

resident. I participated in the management of the patient.

## 2019-08-01 NOTE — PN
Hospitalist Progress Note


Date of Service: 08/01/19


HD # 5 on 8/1





Attending Assessment and Plan:


74F PMH RA on MTX/Plaquenil, chronic pain on Butrans, ostomy status from prior 

diverticulitis, anxeity and depression on benzo, HTN, hypothyroid, CAD s/p 

distant MI, heart murmur with recent Echo showing Left Ventricular Hypertrophy 

w DYNAMIC LVOT who presented with cough found to have RLL PNA, hypoxic resp 

failure.





#CAP:


-Levaquin day 5/7 on 8/1


-Allergy to Cephalosporins


-Continue to monitor, improving on exam and clinically





#Hypoxic Resp failure: New O2 requirement, weaning, trial diuresis in hopes to 

optimize for d/c





#Heart murmur: Old echo showed dynamic LVOT, on home verapamil and metoprolol, 

HCTZ to resume on dc





#Chronic pain: Tylenol, topical, Buprenorphine patch





#RA: Continue plaquenil, reasonable to hold MTX given active infxn, resume on d/

c





#HTN: controlled on home meds, adding Lasix





#Hypothyroid: home dose





#Anxiety/Depression: home meds





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





#DVT PPX: SQH





#Code Status: Full


#Dispo-consider d/c when O2 requirment is off, will trial today, tolerat >89%

## 2019-08-02 LAB
ANION GAP SERPL CALC-SCNC: 7 MMOL/L (ref 2–11)
BASOPHILS # BLD AUTO: 0.1 10^3/UL (ref 0–0.2)
BUN SERPL-MCNC: 16 MG/DL (ref 6–24)
BUN/CREAT SERPL: 22.5 (ref 8–20)
CALCIUM SERPL-MCNC: 9.4 MG/DL (ref 8.6–10.3)
CHLORIDE SERPL-SCNC: 104 MMOL/L (ref 101–111)
EOSINOPHIL # BLD AUTO: 0.4 10^3/UL (ref 0–0.6)
GLUCOSE SERPL-MCNC: 105 MG/DL (ref 70–100)
HCO3 SERPL-SCNC: 26 MMOL/L (ref 22–32)
HCT VFR BLD AUTO: 30 % (ref 35–47)
HGB BLD-MCNC: 9.8 G/DL (ref 12–16)
LYMPHOCYTES # BLD AUTO: 1.3 10^3/UL (ref 1–4.8)
MCH RBC QN AUTO: 27 PG (ref 27–31)
MCHC RBC AUTO-ENTMCNC: 33 G/DL (ref 31–36)
MCV RBC AUTO: 81 FL (ref 80–97)
MONOCYTES # BLD AUTO: 0.7 10^3/UL (ref 0–0.8)
NEUTROPHILS # BLD AUTO: 4.1 10^3/UL (ref 1.5–7.7)
NRBC # BLD AUTO: 0 10^3/UL
NRBC BLD QL AUTO: 0
PLATELET # BLD AUTO: 281 10^3/UL (ref 150–450)
POTASSIUM SERPL-SCNC: 4.3 MMOL/L (ref 3.5–5)
RBC # BLD AUTO: 3.65 10^6 /UL (ref 3.7–4.87)
SODIUM SERPL-SCNC: 137 MMOL/L (ref 135–145)
TROPONIN I SERPL-MCNC: 0.01 NG/ML (ref ?–0.04)
WBC # BLD AUTO: 6.6 10^3/UL (ref 3.5–10.8)

## 2019-08-02 RX ADMIN — ACETAMINOPHEN AND CODEINE PHOSPHATE PRN TAB: 300; 30 TABLET ORAL at 18:28

## 2019-08-02 RX ADMIN — LIDOCAINE 4% SCH APPLIC: 4 GEL TOPICAL at 09:57

## 2019-08-02 RX ADMIN — MORPHINE SULFATE ONE: 4 INJECTION INTRAVENOUS at 09:10

## 2019-08-02 RX ADMIN — FOLIC ACID SCH MG: 1 TABLET ORAL at 08:49

## 2019-08-02 RX ADMIN — METOPROLOL TARTRATE SCH MG: 50 TABLET, FILM COATED ORAL at 09:47

## 2019-08-02 RX ADMIN — GABAPENTIN SCH MG: 300 CAPSULE ORAL at 13:14

## 2019-08-02 RX ADMIN — Medication SCH TAB: at 20:38

## 2019-08-02 RX ADMIN — WATER SCH NOTE: 100 INJECTION, SOLUTION INTRAVENOUS at 20:34

## 2019-08-02 RX ADMIN — DULOXETINE HYDROCHLORIDE SCH MG: 20 CAPSULE, DELAYED RELEASE ORAL at 08:49

## 2019-08-02 RX ADMIN — ASPIRIN SCH MG: 81 TABLET, COATED ORAL at 08:49

## 2019-08-02 RX ADMIN — POTASSIUM CHLORIDE SCH MEQ: 750 TABLET, FILM COATED, EXTENDED RELEASE ORAL at 20:39

## 2019-08-02 RX ADMIN — LIDOCAINE 4% SCH APPLIC: 4 GEL TOPICAL at 20:35

## 2019-08-02 RX ADMIN — HEPARIN SODIUM SCH UNITS: 5000 INJECTION INTRAVENOUS; SUBCUTANEOUS at 21:05

## 2019-08-02 RX ADMIN — GABAPENTIN SCH MG: 300 CAPSULE ORAL at 08:49

## 2019-08-02 RX ADMIN — HEPARIN SODIUM SCH UNITS: 5000 INJECTION INTRAVENOUS; SUBCUTANEOUS at 13:15

## 2019-08-02 RX ADMIN — Medication SCH TAB: at 08:49

## 2019-08-02 RX ADMIN — MORPHINE SULFATE ONE MG: 4 INJECTION INTRAVENOUS at 08:48

## 2019-08-02 RX ADMIN — FAMOTIDINE SCH MG: 20 TABLET, FILM COATED ORAL at 20:38

## 2019-08-02 RX ADMIN — LIDOCAINE 4% SCH APPLIC: 4 GEL TOPICAL at 15:35

## 2019-08-02 RX ADMIN — HYDROCHLOROTHIAZIDE SCH MG: 25 TABLET ORAL at 08:49

## 2019-08-02 RX ADMIN — POTASSIUM CHLORIDE SCH MEQ: 750 TABLET, FILM COATED, EXTENDED RELEASE ORAL at 09:57

## 2019-08-02 RX ADMIN — GABAPENTIN SCH MG: 300 CAPSULE ORAL at 20:38

## 2019-08-02 RX ADMIN — ACETAMINOPHEN AND CODEINE PHOSPHATE PRN TAB: 300; 30 TABLET ORAL at 04:48

## 2019-08-02 RX ADMIN — METOPROLOL TARTRATE SCH MG: 50 TABLET, FILM COATED ORAL at 20:39

## 2019-08-02 RX ADMIN — LEVOTHYROXINE SODIUM SCH MCG: 112 TABLET ORAL at 05:11

## 2019-08-02 RX ADMIN — VERAPAMIL HYDROCHLORIDE SCH MG: 180 CAPSULE, EXTENDED RELEASE ORAL at 09:45

## 2019-08-02 RX ADMIN — ATORVASTATIN CALCIUM SCH MG: 40 TABLET, FILM COATED ORAL at 08:49

## 2019-08-02 RX ADMIN — PREDNISONE SCH MG: 20 TABLET ORAL at 13:14

## 2019-08-02 RX ADMIN — HEPARIN SODIUM SCH UNITS: 5000 INJECTION INTRAVENOUS; SUBCUTANEOUS at 05:11

## 2019-08-02 RX ADMIN — HYDROXYCHLOROQUINE SULFATE SCH MG: 200 TABLET, FILM COATED ORAL at 08:49

## 2019-08-02 RX ADMIN — LIDOCAINE SCH PATCH: 50 PATCH CUTANEOUS at 09:45

## 2019-08-02 RX ADMIN — PANTOPRAZOLE SODIUM SCH MG: 40 TABLET, DELAYED RELEASE ORAL at 09:45

## 2019-08-02 RX ADMIN — LEVOFLOXACIN SCH MG: 750 TABLET, FILM COATED ORAL at 09:46

## 2019-08-02 NOTE — PN
Hospitalist Progress Note


Date of Service: 08/02/19





HD # 6 on 8/2


24 Hours Update: Had transient CP this AM, EKG and trop normal, resolved 

quickly with repositinoing. Eating, feeling well, now on RA.


Attending Assessment and Plan:


74F PMH RA on MTX/Plaquenil, chronic pain on Butrans, ostomy status from prior 

diverticulitis, anxeity and depression on benzo, HTN, hypothyroid, CAD s/p 

distant MI, heart murmur with recent Echo showing Left Ventricular Hypertrophy 

w DYNAMIC LVOT who presented with cough found to have RLL PNA, hypoxic resp 

failure.





#CAP:


-Levaquin day 6/7 on 8/2


-Allergy to Cephalosporins


-Continue to monitor, improving on exam and clinically





#Hypoxic Resp failure: New O2 requirement, weaning, now on RA





#Heart murmur: Old echo showed dynamic LVOT, on home verapamil and metoprolol, 

HCTZ to resume on dc





#Chronic pain: Tylenol, topical, Buprenorphine patch





#RA: Continue plaquenil, reasonable to hold MTX given active infxn, resume on d/

c





#HTN: controlled on home meds, adding Lasix





#Hypothyroid: home dose





#Anxiety/Depression: home meds





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





#DVT PPX: SQH





#Code Status: Full


#Dispo-consider d/c when O2 requirment is off, will trial today, tolerat >89%

## 2019-08-02 NOTE — PN
<Amanda Portillo - Last Filed: 08/02/19 14:28>





Subjective


Date of Service: 08/02/19


Interval History: 





Patient had one episode of chest pain this morning. The pain was in left and 

central area,sharp pain, radiating to jaw. Happened when he was sitting on the 

bed, claimed the pain was the same as she experienced during heart attack. EKG 

stat is normal sinus rhythm. It went away within 5 min. 





Patient's oxygen dropped to 80% when she was transferring to her chair today. 





Objective


Active Medications: 








Acetaminophen (Tylenol Tab*)  650 mg PO Q6H PRN


   PRN Reason: mild pain/fever>100.4


   Last Admin: 08/01/19 14:33 Dose:  650 mg


Acetaminophen/Codeine Phosphate (Tylenol/Codeine 30 Mg Tab*)  2 tab PO BID PRN


   PRN Reason: Moderate Pain


   Last Admin: 08/02/19 04:48 Dose:  2 tab


Al Hydrox/Mg Hydrox/Simethicone (Maalox Plus*)  5 ml PO Q4H PRN


   PRN Reason: HEARTBURN


Albuterol (Ventolin Hfa Inhaler*)  2 puff INH QID PRN


   PRN Reason: SHORTNESS OF BREATH


Albuterol/Ipratropium (Duoneb (Albuterol 2.5 Mg/Ipratropium 0.5 Mg))  1 neb INH 

Q4H PRN


   PRN Reason: SOB/WHEEZING


Alprazolam (Xanax Tab*)  0.25 mg PO BID PRN


   PRN Reason: ANXIETY


   Last Admin: 08/01/19 12:05 Dose:  0.25 mg


Aspirin (Aspirin Ec Tab*)  81 mg PO DAILY [facility]


   Last Admin: 08/02/19 08:49 Dose:  81 mg


Atorvastatin Calcium (Lipitor*)  40 mg PO DAILY [facility]


   Last Admin: 08/02/19 08:49 Dose:  40 mg


Buprenorphine (Butrans 10 Mcg Patch(Nf))  10 mcg TRANSDERM Q7D [facility]


   Last Admin: 07/29/19 20:17 Dose:  10 mcg


Calcium/Vitamin D (Oscal D Tab 250/125*)  1 tab PO BID [facility]


   Last Admin: 08/02/19 08:49 Dose:  1 tab


Cyclobenzaprine HCl (Flexeril Tab*)  5 mg PO BID PRN


   PRN Reason: SPASMS


Duloxetine HCl (Cymbalta Cap*)  20 mg PO DAILY [facility]


   Last Admin: 08/02/19 08:49 Dose:  20 mg


Famotidine (Pepcid Tab*)  20 mg PO BEDTIME [facility]; Protocol


   Last Admin: 08/01/19 21:03 Dose:  20 mg


Folic Acid (Folvite Tab*)  1 mg PO DAILY [facility]


   Last Admin: 08/02/19 08:49 Dose:  1 mg


Gabapentin (Neurontin Cap(*))  300 mg PO TID [facility]


   Last Admin: 08/02/19 13:14 Dose:  300 mg


Heparin Sodium (Porcine) (Heparin Vial(*))  5,000 units SUBCUT Q8HR [facility]


   Last Admin: 08/02/19 13:15 Dose:  5,000 units


Hydrochlorothiazide (Hydrodiuril Tab*)  25 mg PO DAILY [facility]


   Last Admin: 08/02/19 08:49 Dose:  25 mg


Hydroxychloroquine Sulfate (Plaquenil Tab*)  200 mg PO DAILY [facility]


   Last Admin: 08/02/19 08:49 Dose:  200 mg


Levofloxacin (Levaquin Tab*)  750 mg PO Q24H [facility]; Protocol


   Last Admin: 08/02/19 09:46 Dose:  750 mg


Levothyroxine Sodium (Synthroid Tab*)  112 mcg PO DAILY@0600 [facility]


   Last Admin: 08/02/19 05:11 Dose:  112 mcg


Lidocaine (Lidoderm 5% Patch*)  1 patch TRANSDERM DAILY [facility]


   Last Admin: 08/02/19 09:45 Dose:  1 patch


Lidocaine (Topicaine 4% Gel*)  1 applic TOPICAL TID [facility]


   Last Admin: 08/02/19 09:57 Dose:  1 applic


Metoprolol Tartrate (Lopressor Tab*)  50 mg PO BID [facility]


   Last Admin: 08/02/19 09:47 Dose:  50 mg


Pantoprazole Sodium (Protonix Tab*)  40 mg PO DAILY [facility]


   Last Admin: 08/02/19 09:45 Dose:  40 mg


Pharmacy Profile Note (Lidocaine Patch Remove*)  1 note N/A 2100 [facility]


   Last Admin: 08/01/19 21:04 Dose:  1 note


Pharmacy Profile Note (Buprenorph Patch Check Q Shift)  1 note FOLLOW UP 0700,

1900 [facility]


   Last Admin: 08/02/19 07:55 Dose:  1 note


Potassium Chloride (Klor Con Er Tab*)  10 meq PO BID [facility]


   Last Admin: 08/02/19 09:57 Dose:  10 meq


Prednisone (Deltasone Tab*)  40 mg PO DAILY [facility]


   Last Admin: 08/02/19 13:14 Dose:  40 mg


Prochlorperazine Edisylate (Compazine Inj*)  5 mg IV Q6H PRN


   PRN Reason: NAUSEA/VOMITING


   Last Admin: 07/30/19 11:31 Dose:  5 mg


Verapamil HCl (Calan Sr Cap*)  180 mg PO DAILY [facility]


   Last Admin: 08/02/19 09:45 Dose:  180 mg








 Vital Signs - 8 hr











  08/02/19 08/02/19 08/02/19





  06:48 07:15 08:00


 


Temperature  97.9 F 


 


Pulse Rate  77 


 


Respiratory 17 18 17





Rate   


 


Blood Pressure  110/51 





(mmHg)   


 


O2 Sat by Pulse  92 





Oximetry   














  08/02/19 08/02/19 08/02/19





  08:49 11:00 13:14


 


Temperature   


 


Pulse Rate   


 


Respiratory 19 18 17





Rate   


 


Blood Pressure   





(mmHg)   


 


O2 Sat by Pulse   





Oximetry   











Oxygen Devices in Use Now: Nasal Cannula


Exam: 





well, alert.


Heart: normal S1S2


Lung: clear


Abdomen: soft, tender





no JVP elevation


Result Diagrams: 


 08/02/19 06:46





 08/02/19 06:46





Assess/Plan/Problems-Billing


Assessment: 





Ms. Gage is a 74 years old lady with a background of rheumatoid arthritis 

on MTX and Plaquenil, osteoarthritis, coronary artery disease, status post MI, 

hypertension, hypothjyroidism, who presented to ED for worsening cough and 

hemoptysis, which was confirmed to be community acquired pneumonia. She is put 

on levofloxacin treatment in view of her multiple penicillin allergy, and 

planned for 7 days course. Her symptoms are resolving with current antibiotics. 

She would be ready for discharge once she weaned off O2. 





- Patient Problems


(1) Community acquired pneumonia


Current Visit: Yes   Status: Acute   Code(s): J18.9 - PNEUMONIA, UNSPECIFIED 

ORGANISM   SNOMED Code(s): 990873562


   Comment: Continue levofloxacin for 7 days in total


wean down O2 nasal canula, keep spO2> 92%


trace strep peumonia, legionella result   





(2) Rheumatoid arthritis


Current Visit: Yes   Status: Acute   Code(s): M06.9 - RHEUMATOID ARTHRITIS, 

UNSPECIFIED   SNOMED Code(s): 04257317


   Comment: on MTX and hydroxychloroquine, we hold off Methotrexate for now due 

to infection.   





(3) Hypertension


Current Visit: Yes   Status: Acute   Code(s): I10 - ESSENTIAL (PRIMARY) 

HYPERTENSION   SNOMED Code(s): 55456393


   Comment: - old med include hydrochlothiazide, verapamil, metoprolol for 

hypertension


- bp lowish since admission, stop hydrochlothiazide, verapamil, decrease 

metoprol to 25mg bid initially, currently resolved over   





(4) Hypothyroidism


Current Visit: Yes   Status: Acute   Code(s): E03.9 - HYPOTHYROIDISM, 

UNSPECIFIED   SNOMED Code(s): 76688463


   Comment: Continue old dose of levothyroidism   





(5) Osteoarthritis


Current Visit: Yes   Status: Acute   Code(s): M19.90 - UNSPECIFIED 

OSTEOARTHRITIS, UNSPECIFIED SITE   SNOMED Code(s): 540614871


   Comment: not well controlled pain despite multiple med, see details under 

the problem joint pain    





(6) Coronary artery disease


Current Visit: Yes   Status: Acute   Code(s): I25.10 - ATHSCL HEART DISEASE OF 

NATIVE CORONARY ARTERY W/O ANG PCTRS   SNOMED Code(s): 41048093


   Comment: on aspirin, statin, metoprolol   





(7) Joint pain


Current Visit: Yes   Status: Acute   Code(s): M25.50 - PAIN IN UNSPECIFIED 

JOINT   SNOMED Code(s): 68768822


   Comment: old pain med: ibuprofen 400mg q8h, acetaminophen/codeine 30mg tab, 

buprenorhine 10mcg patch, gabapentin, duolexetine


- add on diclofenac gel, lidocaine patch and also lidocaine gel


- continue ibuprofen and acetaminophen/codeine, gabapentin for now


- consider heat/ice compression for joint pain   


Status and Disposition: 





Inpatient medicine for now.


Plan for discharge tomorrow once successfily weaning off O2





Attestation


Documenting Resident: Amanda Portillo


Supervising Physician: Elisabeth Boyce


Attestation: 


This service has been performed in part by a resident under the direction of a 

teaching physician.I, Elisabeth Boyce, performed the service, or was physically 

present during the critical, or key portions of the service, furnished by the 

resident. I participated in the management of the patient.





<Elisabeth Boyce - Last Filed: 08/02/19 16:22>





Objective


Active Medications: 








Acetaminophen (Tylenol Tab*)  650 mg PO Q6H PRN


   PRN Reason: mild pain/fever>100.4


   Last Admin: 08/01/19 14:33 Dose:  650 mg


Acetaminophen/Codeine Phosphate (Tylenol/Codeine 30 Mg Tab*)  2 tab PO BID PRN


   PRN Reason: Moderate Pain


   Last Admin: 08/02/19 04:48 Dose:  2 tab


Al Hydrox/Mg Hydrox/Simethicone (Maalox Plus*)  5 ml PO Q4H PRN


   PRN Reason: HEARTBURN


Albuterol (Ventolin Hfa Inhaler*)  2 puff INH QID PRN


   PRN Reason: SHORTNESS OF BREATH


Albuterol/Ipratropium (Duoneb (Albuterol 2.5 Mg/Ipratropium 0.5 Mg))  1 neb INH 

Q4H PRN


   PRN Reason: SOB/WHEEZING


Alprazolam (Xanax Tab*)  0.25 mg PO BID PRN


   PRN Reason: ANXIETY


   Last Admin: 08/01/19 12:05 Dose:  0.25 mg


Aspirin (Aspirin Ec Tab*)  81 mg PO DAILY [facility]


   Last Admin: 08/02/19 08:49 Dose:  81 mg


Atorvastatin Calcium (Lipitor*)  40 mg PO DAILY [facility]


   Last Admin: 08/02/19 08:49 Dose:  40 mg


Buprenorphine (Butrans 10 Mcg Patch(Nf))  10 mcg TRANSDERM Q7D [facility]


   Last Admin: 07/29/19 20:17 Dose:  10 mcg


Calcium/Vitamin D (Oscal D Tab 250/125*)  1 tab PO BID [facility]


   Last Admin: 08/02/19 08:49 Dose:  1 tab


Cyclobenzaprine HCl (Flexeril Tab*)  5 mg PO BID PRN


   PRN Reason: SPASMS


Duloxetine HCl (Cymbalta Cap*)  20 mg PO DAILY [facility]


   Last Admin: 08/02/19 08:49 Dose:  20 mg


Famotidine (Pepcid Tab*)  20 mg PO BEDTIME [facility]; Protocol


   Last Admin: 08/01/19 21:03 Dose:  20 mg


Folic Acid (Folvite Tab*)  1 mg PO DAILY [facility]


   Last Admin: 08/02/19 08:49 Dose:  1 mg


Gabapentin (Neurontin Cap(*))  300 mg PO TID [facility]


   Last Admin: 08/02/19 13:14 Dose:  300 mg


Heparin Sodium (Porcine) (Heparin Vial(*))  5,000 units SUBCUT Q8HR [facility]


   Last Admin: 08/02/19 13:15 Dose:  5,000 units


Hydrochlorothiazide (Hydrodiuril Tab*)  25 mg PO DAILY [facility]


   Last Admin: 08/02/19 08:49 Dose:  25 mg


Hydroxychloroquine Sulfate (Plaquenil Tab*)  200 mg PO DAILY [facility]


   Last Admin: 08/02/19 08:49 Dose:  200 mg


Levofloxacin (Levaquin Tab*)  750 mg PO Q24H [facility]; Protocol


   Last Admin: 08/02/19 09:46 Dose:  750 mg


Levothyroxine Sodium (Synthroid Tab*)  112 mcg PO DAILY@0600 [facility]


   Last Admin: 08/02/19 05:11 Dose:  112 mcg


Lidocaine (Lidoderm 5% Patch*)  1 patch TRANSDERM DAILY [facility]


   Last Admin: 08/02/19 09:45 Dose:  1 patch


Lidocaine (Topicaine 4% Gel*)  1 applic TOPICAL TID [facility]


   Last Admin: 08/02/19 15:35 Dose:  1 applic


Metoprolol Tartrate (Lopressor Tab*)  50 mg PO BID [facility]


   Last Admin: 08/02/19 09:47 Dose:  50 mg


Pantoprazole Sodium (Protonix Tab*)  40 mg PO DAILY [facility]


   Last Admin: 08/02/19 09:45 Dose:  40 mg


Pharmacy Profile Note (Lidocaine Patch Remove*)  1 note N/A 2100 [facility]


   Last Admin: 08/01/19 21:04 Dose:  1 note


Pharmacy Profile Note (Buprenorph Patch Check Q Shift)  1 note FOLLOW UP 0700,

1900 [facility]


   Last Admin: 08/02/19 07:55 Dose:  1 note


Potassium Chloride (Klor Con Er Tab*)  10 meq PO BID [facility]


   Last Admin: 08/02/19 09:57 Dose:  10 meq


Prednisone (Deltasone Tab*)  40 mg PO DAILY [facility]


   Last Admin: 08/02/19 13:14 Dose:  40 mg


Prochlorperazine Edisylate (Compazine Inj*)  5 mg IV Q6H PRN


   PRN Reason: NAUSEA/VOMITING


   Last Admin: 07/30/19 11:31 Dose:  5 mg


Verapamil HCl (Calan Sr Cap*)  180 mg PO DAILY [facility]


   Last Admin: 08/02/19 09:45 Dose:  180 mg








 Vital Signs - 8 hr











  08/02/19 08/02/19 08/02/19





  08:49 08:55 10:00


 


Temperature  97 F 


 


Pulse Rate  76 


 


Respiratory 19 18 





Rate   


 


Blood Pressure  125/62 





(mmHg)   


 


O2 Sat by Pulse  90 95





Oximetry   














  08/02/19 08/02/19 08/02/19





  11:00 11:30 13:14


 


Temperature  97.8 F 


 


Pulse Rate  96 


 


Respiratory 18 18 17





Rate   


 


Blood Pressure  120/56 





(mmHg)   


 


O2 Sat by Pulse  92 





Oximetry   














  08/02/19





  15:15


 


Temperature 98.5 F


 


Pulse Rate 96


 


Respiratory 20





Rate 


 


Blood Pressure 118/53





(mmHg) 


 


O2 Sat by Pulse 94





Oximetry 











Result Diagrams: 


 08/02/19 06:46





 08/02/19 06:46





Assess/Plan/Problems-Billing


Assessment:

## 2019-08-03 VITALS — DIASTOLIC BLOOD PRESSURE: 56 MMHG | SYSTOLIC BLOOD PRESSURE: 127 MMHG

## 2019-08-03 RX ADMIN — GABAPENTIN SCH MG: 300 CAPSULE ORAL at 08:56

## 2019-08-03 RX ADMIN — HEPARIN SODIUM SCH UNITS: 5000 INJECTION INTRAVENOUS; SUBCUTANEOUS at 06:16

## 2019-08-03 RX ADMIN — HYDROXYCHLOROQUINE SULFATE SCH MG: 200 TABLET, FILM COATED ORAL at 08:56

## 2019-08-03 RX ADMIN — VERAPAMIL HYDROCHLORIDE SCH MG: 180 CAPSULE, EXTENDED RELEASE ORAL at 10:43

## 2019-08-03 RX ADMIN — ATORVASTATIN CALCIUM SCH MG: 40 TABLET, FILM COATED ORAL at 08:55

## 2019-08-03 RX ADMIN — METOPROLOL TARTRATE SCH MG: 50 TABLET, FILM COATED ORAL at 10:43

## 2019-08-03 RX ADMIN — LIDOCAINE 4% SCH APPLIC: 4 GEL TOPICAL at 08:53

## 2019-08-03 RX ADMIN — ACETAMINOPHEN AND CODEINE PHOSPHATE PRN TAB: 300; 30 TABLET ORAL at 13:55

## 2019-08-03 RX ADMIN — GABAPENTIN SCH MG: 300 CAPSULE ORAL at 13:56

## 2019-08-03 RX ADMIN — ACETAMINOPHEN AND CODEINE PHOSPHATE PRN TAB: 300; 30 TABLET ORAL at 04:18

## 2019-08-03 RX ADMIN — PREDNISONE SCH MG: 20 TABLET ORAL at 08:55

## 2019-08-03 RX ADMIN — LIDOCAINE SCH PATCH: 50 PATCH CUTANEOUS at 08:53

## 2019-08-03 RX ADMIN — ASPIRIN SCH MG: 81 TABLET, COATED ORAL at 08:56

## 2019-08-03 RX ADMIN — HYDROCHLOROTHIAZIDE SCH MG: 25 TABLET ORAL at 10:43

## 2019-08-03 RX ADMIN — DULOXETINE HYDROCHLORIDE SCH MG: 20 CAPSULE, DELAYED RELEASE ORAL at 08:55

## 2019-08-03 RX ADMIN — Medication SCH TAB: at 08:56

## 2019-08-03 RX ADMIN — HEPARIN SODIUM SCH: 5000 INJECTION INTRAVENOUS; SUBCUTANEOUS at 13:56

## 2019-08-03 RX ADMIN — POTASSIUM CHLORIDE SCH MEQ: 750 TABLET, FILM COATED, EXTENDED RELEASE ORAL at 08:55

## 2019-08-03 RX ADMIN — LEVOTHYROXINE SODIUM SCH MCG: 112 TABLET ORAL at 06:16

## 2019-08-03 RX ADMIN — FOLIC ACID SCH MG: 1 TABLET ORAL at 08:56

## 2019-08-03 RX ADMIN — LEVOFLOXACIN SCH MG: 750 TABLET, FILM COATED ORAL at 08:56

## 2019-08-03 RX ADMIN — PANTOPRAZOLE SODIUM SCH MG: 40 TABLET, DELAYED RELEASE ORAL at 08:56

## 2019-08-03 NOTE — PN
Subjective


Date of Service: 08/03/19


Interval History: 





HD # 7 on 8/3


74F PMH RA on MTX/Plaquenil, chronic pain on Butrans, ostomy status from prior 

diverticulitis, anxeity and depression on benzo, HTN, hypothyroid, CAD s/p 

distant MI, heart murmur with recent Echo showing Left Ventricular Hypertrophy 

w DYNAMIC LVOT who presented with cough found to have RLL PNA, hypoxic resp 

failure.





Overnight no acute events, VSS, still remains on NC, 1L NC, TRIALED OFF OXYGEN 

AND AMBULATING AT 90%


Started pred yesterday


No labs





This morning says this is th ebest she has felt in this hosiptilization, moving 

better, breathing better and would like to trial off O2 and transfer. No CP no 

SOB mild cough no GI  complaints. 





Objective


Active Medications: 








Acetaminophen (Tylenol Tab*)  650 mg PO Q6H PRN


   PRN Reason: mild pain/fever>100.4


   Last Admin: 08/01/19 14:33 Dose:  650 mg


Acetaminophen/Codeine Phosphate (Tylenol/Codeine 30 Mg Tab*)  2 tab PO BID PRN


   PRN Reason: Moderate Pain


   Last Admin: 08/03/19 04:18 Dose:  2 tab


Al Hydrox/Mg Hydrox/Simethicone (Maalox Plus*)  5 ml PO Q4H PRN


   PRN Reason: HEARTBURN


Albuterol (Ventolin Hfa Inhaler*)  2 puff INH QID PRN


   PRN Reason: SHORTNESS OF BREATH


Albuterol/Ipratropium (Duoneb (Albuterol 2.5 Mg/Ipratropium 0.5 Mg))  1 neb INH 

Q4H PRN


   PRN Reason: SOB/WHEEZING


Alprazolam (Xanax Tab*)  0.25 mg PO BID PRN


   PRN Reason: ANXIETY


   Last Admin: 08/01/19 12:05 Dose:  0.25 mg


Aspirin (Aspirin Ec Tab*)  81 mg PO DAILY [facility]


   Last Admin: 08/02/19 08:49 Dose:  81 mg


Atorvastatin Calcium (Lipitor*)  40 mg PO DAILY [facility]


   Last Admin: 08/02/19 08:49 Dose:  40 mg


Buprenorphine (Butrans 10 Mcg Patch(Nf))  10 mcg TRANSDERM Q7D [facility]


   Last Admin: 07/29/19 20:17 Dose:  10 mcg


Calcium/Vitamin D (Oscal D Tab 250/125*)  1 tab PO BID [facility]


   Last Admin: 08/02/19 20:38 Dose:  1 tab


Cyclobenzaprine HCl (Flexeril Tab*)  5 mg PO BID PRN


   PRN Reason: SPASMS


   Last Admin: 08/03/19 04:43 Dose:  5 mg


Duloxetine HCl (Cymbalta Cap*)  20 mg PO DAILY [facility]


   Last Admin: 08/02/19 08:49 Dose:  20 mg


Famotidine (Pepcid Tab*)  20 mg PO BEDTIME [facility]; Protocol


   Last Admin: 08/02/19 20:38 Dose:  20 mg


Folic Acid (Folvite Tab*)  1 mg PO DAILY [facility]


   Last Admin: 08/02/19 08:49 Dose:  1 mg


Gabapentin (Neurontin Cap(*))  300 mg PO TID [facility]


   Last Admin: 08/02/19 20:38 Dose:  300 mg


Heparin Sodium (Porcine) (Heparin Vial(*))  5,000 units SUBCUT Q8HR [facility]


   Last Admin: 08/03/19 06:16 Dose:  5,000 units


Hydrochlorothiazide (Hydrodiuril Tab*)  25 mg PO DAILY [facility]


   Last Admin: 08/02/19 08:49 Dose:  25 mg


Hydroxychloroquine Sulfate (Plaquenil Tab*)  200 mg PO DAILY [facility]


   Last Admin: 08/02/19 08:49 Dose:  200 mg


Levofloxacin (Levaquin Tab*)  750 mg PO Q24H [facility]; Protocol


   Last Admin: 08/02/19 09:46 Dose:  750 mg


Levothyroxine Sodium (Synthroid Tab*)  112 mcg PO DAILY@0600 [facility]


   Last Admin: 08/03/19 06:16 Dose:  112 mcg


Lidocaine (Lidoderm 5% Patch*)  1 patch TRANSDERM DAILY [facility]


   Last Admin: 08/02/19 09:45 Dose:  1 patch


Lidocaine (Topicaine 4% Gel*)  1 applic TOPICAL TID [facility]


   Last Admin: 08/02/19 20:35 Dose:  1 applic


Metoprolol Tartrate (Lopressor Tab*)  50 mg PO BID [facility]


   Last Admin: 08/02/19 20:39 Dose:  50 mg


Pantoprazole Sodium (Protonix Tab*)  40 mg PO DAILY [facility]


   Last Admin: 08/02/19 09:45 Dose:  40 mg


Pharmacy Profile Note (Lidocaine Patch Remove*)  1 note N/A 2100 [facility]


   Last Admin: 08/02/19 20:34 Dose:  1 note


Pharmacy Profile Note (Buprenorph Patch Check Q Shift)  1 note FOLLOW UP 0700,

1900 [facility]


   Last Admin: 08/03/19 07:02 Dose:  1 note


Potassium Chloride (Klor Con Er Tab*)  10 meq PO BID [facility]


   Last Admin: 08/02/19 20:39 Dose:  10 meq


Prednisone (Deltasone Tab*)  40 mg PO DAILY [facility]


   Last Admin: 08/02/19 13:14 Dose:  40 mg


Prochlorperazine Edisylate (Compazine Inj*)  5 mg IV Q6H PRN


   PRN Reason: NAUSEA/VOMITING


   Last Admin: 07/30/19 11:31 Dose:  5 mg


Verapamil HCl (Calan Sr Cap*)  180 mg PO DAILY [facility]


   Last Admin: 08/02/19 09:45 Dose:  180 mg








 Vital Signs - 8 hr











  08/03/19 08/03/19 08/03/19





  00:30 03:30 04:18


 


Temperature  97.0 F 


 


Pulse Rate  92 


 


Respiratory 20 20 20





Rate   


 


Blood Pressure  134/68 





(mmHg)   


 


O2 Sat by Pulse  95 





Oximetry   














  08/03/19 08/03/19 08/03/19





  04:43 06:45 07:15


 


Temperature   98.6 F


 


Pulse Rate   97


 


Respiratory 20 16 16





Rate   


 


Blood Pressure   92/61





(mmHg)   


 


O2 Sat by Pulse   94





Oximetry   











Oxygen Devices in Use Now: Nasal Cannula


Appearance: Pleasant woman in NAD


Eyes: No Scleral Icterus, PERRLA


Ears/Nose/Mouth/Throat: NL Teeth, Lips, Gums, Clear Oropharnyx


Neck: NL Appearance and Movements; NL JVP


Respiratory: Symmetrical Chest Expansion and Respiratory Effort, - - Scant 

crackles in RLL


Cardiovascular: - - 3/6 murmur across precordium


Abdominal: NL Sounds; No Tenderness; No Distention, No Hepatosplenomegaly


Lymphatic: No Cervical Adenopathy


Extremities: No Edema


Skin: No Rash or Ulcers


Neurological: Alert and Oriented x 3


Result Diagrams: 


 08/02/19 06:46





 08/02/19 06:46


Microbiology and Other Data: 


 Microbiology











 07/29/19 00:13 Aerobic Blood Culture - Final





 Blood Venous    No Growth Day 5





 Anaerobic Blood Culture - Final





    No Growth Day 5


 


 07/29/19 00:13 Aerobic Blood Culture - Final





 Blood Venous    No Growth Day 5





 Anaerobic Blood Culture - Final





    No Growth Day 5


 


 07/29/19 18:50 Legionella Urinary Antigen - Final





 Urine    Negative Legionella Antigen





 Streptococcus pneumoniae Ag Screen - Final





    Negative S. pneumo Antigen


 


 07/29/19 01:35 Nasal Screen MRSA (PCR) - Final





 Nasal    Mrsa Not Detected














Assess/Plan/Problems-Billing


Assessment: 


74F PMH RA on MTX/Plaquenil, chronic pain on Butrans, ostomy status from prior 

diverticulitis, anxeity and depression on benzo, HTN, hypothyroid, CAD s/p 

distant MI, heart murmur with recent Echo showing Left Ventricular Hypertrophy 

w DYNAMIC LVOT who presented with cough found to have RLL PNA, hypoxic resp 

failure.








- Patient Problems


(1) Community acquired pneumonia


Current Visit: Yes   Status: Acute   Code(s): J18.9 - PNEUMONIA, UNSPECIFIED 

ORGANISM   SNOMED Code(s): 527215008


   Comment: 


- On Levofloxacin Day 7/7 on 8/3, has allergy to Ceph and PCN


- RLL with mild crackles still





   





(2) Hypoxia


Current Visit: Yes   Status: Acute   Code(s): R09.02 - HYPOXEMIA   SNOMED Code(s

): 065686119


   Comment: - Persistent, mutlifactoria from infection, reactive airway 

compoenent and ? some fluid from her dynamic LVOT?


- Started prednisone 40mg daily 8/2, continue for 5 day burst, 3 days of tabs 

to go home


- Continue nebs


- Briefly on Lasix, stop on d/c


- Wean as able, unable to secure outpt O2 2/2 to no other qualifying dx   





(3) Heart murmur


Current Visit: Yes   Status: Acute   Code(s): R01.1 - CARDIAC MURMUR, 

UNSPECIFIED   SNOMED Code(s): 53534533


   Comment: - Recent Echo in June 2019 shows dynamic LVOT thought to be from 

hypertrophic cardiac muscle, unclear if true HOCUM.


   





(4) Coronary artery disease


Current Visit: Yes   Status: Acute   Code(s): I25.10 - ATHSCL HEART DISEASE OF 

NATIVE CORONARY ARTERY W/O ANG PCTRS   SNOMED Code(s): 81345516


   Comment: - Optimized on aspirin, statin, metoprolol   





(5) Hypertension


Current Visit: Yes   Status: Acute   Code(s): I10 - ESSENTIAL (PRIMARY) 

HYPERTENSION   SNOMED Code(s): 70681830


   Comment: - Cyrrently on home medications, Verapamil, Metoprolol, HCTZ   





(6) Hypothyroidism


Current Visit: Yes   Status: Acute   Code(s): E03.9 - HYPOTHYROIDISM, 

UNSPECIFIED   SNOMED Code(s): 37817395


   Comment: - Home levothyroxine   





(7) Rheumatoid arthritis


Current Visit: Yes   Status: Acute   Code(s): M06.9 - RHEUMATOID ARTHRITIS, 

UNSPECIFIED   SNOMED Code(s): 16758058


   Comment: - MTX and hydroxychloroquine, we hold off Methotrexate for now due 

to infection.   





(8) DVT prophylaxis


Current Visit: Yes   Status: Acute   Code(s): Z29.9 - ENCOUNTER FOR 

PROPHYLACTIC MEASURES, UNSPECIFIED   SNOMED Code(s): 553787388


   Comment: - SQH   





(9) DNR (do not resuscitate)


Current Visit: Yes   Status: Acute   


Status and Disposition: 








Plan for discharge as soon as off O2

## 2019-10-28 ENCOUNTER — HOSPITAL ENCOUNTER (EMERGENCY)
Dept: HOSPITAL 25 - ED | Age: 74
Discharge: HOME | End: 2019-10-28
Payer: MEDICARE

## 2019-10-28 VITALS — DIASTOLIC BLOOD PRESSURE: 62 MMHG | SYSTOLIC BLOOD PRESSURE: 101 MMHG

## 2019-10-28 DIAGNOSIS — E66.01: ICD-10-CM

## 2019-10-28 DIAGNOSIS — E78.00: ICD-10-CM

## 2019-10-28 DIAGNOSIS — I10: ICD-10-CM

## 2019-10-28 DIAGNOSIS — Z87.01: ICD-10-CM

## 2019-10-28 DIAGNOSIS — R06.02: Primary | ICD-10-CM

## 2019-10-28 DIAGNOSIS — Z88.2: ICD-10-CM

## 2019-10-28 DIAGNOSIS — R94.31: ICD-10-CM

## 2019-10-28 DIAGNOSIS — Z93.3: ICD-10-CM

## 2019-10-28 DIAGNOSIS — Z88.1: ICD-10-CM

## 2019-10-28 DIAGNOSIS — I71.4: ICD-10-CM

## 2019-10-28 DIAGNOSIS — J44.9: ICD-10-CM

## 2019-10-28 DIAGNOSIS — Z88.0: ICD-10-CM

## 2019-10-28 DIAGNOSIS — Z87.891: ICD-10-CM

## 2019-10-28 DIAGNOSIS — M06.9: ICD-10-CM

## 2019-10-28 LAB
ALBUMIN SERPL BCG-MCNC: 3.8 G/DL (ref 3.2–5.2)
ALBUMIN/GLOB SERPL: 1.5 {RATIO} (ref 1–3)
ALP SERPL-CCNC: 120 U/L (ref 34–104)
ALT SERPL W P-5'-P-CCNC: 10 U/L (ref 7–52)
ANION GAP SERPL CALC-SCNC: 6 MMOL/L (ref 2–11)
AST SERPL-CCNC: 14 U/L (ref 13–39)
BASOPHILS # BLD AUTO: 0.2 10^3/UL (ref 0–0.2)
BUN SERPL-MCNC: 21 MG/DL (ref 6–24)
BUN/CREAT SERPL: 26.9 (ref 8–20)
CALCIUM SERPL-MCNC: 9 MG/DL (ref 8.6–10.3)
CHLORIDE SERPL-SCNC: 105 MMOL/L (ref 101–111)
EOSINOPHIL # BLD AUTO: 0.7 10^3/UL (ref 0–0.6)
GLOBULIN SER CALC-MCNC: 2.6 G/DL (ref 2–4)
GLUCOSE SERPL-MCNC: 103 MG/DL (ref 70–100)
HCO3 SERPL-SCNC: 26 MMOL/L (ref 22–32)
HCT VFR BLD AUTO: 28 % (ref 35–47)
HGB BLD-MCNC: 8.7 G/DL (ref 12–16)
LYMPHOCYTES # BLD AUTO: 1.3 10^3/UL (ref 1–4.8)
MAGNESIUM SERPL-MCNC: 1.9 MG/DL (ref 1.9–2.7)
MCH RBC QN AUTO: 21 PG (ref 27–31)
MCHC RBC AUTO-ENTMCNC: 31 G/DL (ref 31–36)
MCV RBC AUTO: 69 FL (ref 80–97)
MONOCYTES # BLD AUTO: 0.6 10^3/UL (ref 0–0.8)
NEUTROPHILS # BLD AUTO: 2.8 10^3/UL (ref 1.5–7.7)
NRBC # BLD AUTO: 0 10^3/UL
NRBC BLD QL AUTO: 0.1
PLATELET # BLD AUTO: 333 10^3/UL (ref 150–450)
POTASSIUM SERPL-SCNC: 4.7 MMOL/L (ref 3.5–5)
PROT SERPL-MCNC: 6.4 G/DL (ref 6.4–8.9)
RBC # BLD AUTO: 4.14 10^6 /UL (ref 3.7–4.87)
SODIUM SERPL-SCNC: 137 MMOL/L (ref 135–145)
TROPONIN I SERPL-MCNC: 0 NG/ML (ref ?–0.04)
TSH SERPL-ACNC: 1.99 MCIU/ML (ref 0.34–5.6)
WBC # BLD AUTO: 5.6 10^3/UL (ref 3.5–10.8)

## 2019-10-28 PROCEDURE — 83735 ASSAY OF MAGNESIUM: CPT

## 2019-10-28 PROCEDURE — 86140 C-REACTIVE PROTEIN: CPT

## 2019-10-28 PROCEDURE — 84443 ASSAY THYROID STIM HORMONE: CPT

## 2019-10-28 PROCEDURE — 80053 COMPREHEN METABOLIC PANEL: CPT

## 2019-10-28 PROCEDURE — 71046 X-RAY EXAM CHEST 2 VIEWS: CPT

## 2019-10-28 PROCEDURE — 36415 COLL VENOUS BLD VENIPUNCTURE: CPT

## 2019-10-28 PROCEDURE — 84484 ASSAY OF TROPONIN QUANT: CPT

## 2019-10-28 PROCEDURE — 83605 ASSAY OF LACTIC ACID: CPT

## 2019-10-28 PROCEDURE — 93005 ELECTROCARDIOGRAM TRACING: CPT

## 2019-10-28 PROCEDURE — 85025 COMPLETE CBC W/AUTO DIFF WBC: CPT

## 2019-10-28 PROCEDURE — 85060 BLOOD SMEAR INTERPRETATION: CPT

## 2019-10-28 PROCEDURE — 99283 EMERGENCY DEPT VISIT LOW MDM: CPT

## 2019-10-28 NOTE — ED
Respiratory





- HPI Summary


HPI Summary: 


This patient is a 74 year old female brought in by EMS [facility] 

a chief complaint of [facility]. Pt had PNA which she was hospitalized here at [facility], d

/c home on PO ABX, and after she finished that medication she was seen by her 

PCP which said she had still PNA, He put her on a different PO ABX, and she 

finished those on 10/24. She stated that she started coughing again on Friday 10

/26 and today she reports fatigue and weakness. She states she is not on home 

O2. The patient has a colostomy. She denies CP and abdominal pain. 





- History of Current Complaint


Chief Complaint: EDShortnessOfBreath


Stated Complaint: SOB PER EMS


Time Seen by Provider: 10/28/19 12:32


Hx Obtained From: Patient


Onset/Duration: Lasting Weeks


Pain Intensity: 8





- Allergy/Home Medications


Allergies/Adverse Reactions: 


 Allergies











Allergy/AdvReac Type Severity Reaction Status Date / Time


 


cephalexin Allergy  Unknown Verified 08/26/19 10:42





   Reaction  





   Details  


 


Penicillins Allergy  Unknown Verified 08/26/19 10:42





   Reaction  





   Details  


 


Sulfa (Sulfonamide Allergy  Unknown Verified 08/26/19 10:42





Antibiotics)   Reaction  





   Details  











Home Medications: 


 Home Medications





Albuterol HFA INHALER* [Ventolin HFA Inhaler*] 2 puff INH QID PRN 10/28/19 [

History Confirmed 10/28/19]


Simethicone [Gas-X] 80 mg PO Q6HR PRN 10/28/19 [History Confirmed 10/28/19]











PMH/Surg Hx/FS Hx/Imm Hx


Endocrine/Hematology History: 


   Denies: Hx Diabetes


Cardiovascular History: Reports: Hx Hypercholesterolemia, Hx Hypertension, 

Other Cardiovascular Problems/Disorders - AAA per patient


Respiratory History: Reports: Hx Chronic Obstructive Pulmonary Disease (COPD)


 History: 


   Denies: Hx Renal Disease


Musculoskeletal History: Reports: Hx Arthritis - R.A., Hx Back Problems, Hx 

Osteoporosis


Sensory History: Reports: Hx Contacts or Glasses


   Denies: Hx Eye Prosthesis, Hx Legally Blind, Hx Deafness, Hx Hearing Aid


Opthamlomology History: Reports: Hx Contacts or Glasses


   Denies: Hx Eye Prosthesis, Hx Legally Blind


Neurological History: 


   Denies: Hx Developmental Delay


Psychiatric History: 


   Denies: Hx Autism





- Surgical History


Surgery Procedure, Year, and Place: Back surgery x2





- Immunization History


Immunizations Up to Date: Yes


Infectious Disease History: No


Infectious Disease History: 


   Denies: Traveled Outside the [place] in Last 30 Days





- Family History


Known Family History: 


   Negative: Seizure Disorder





- Social History


Alcohol Use: None


Substance Use Type: Reports: None


Smoking Status (MU): Former Smoker





Review of Systems


Positive: Fatigue


Negative: Chest Pain


Positive: Cough


Negative: Abdominal Pain


Positive: Weakness


All Other Systems Reviewed And Are Negative: Yes





Physical Exam





- Summary


Physical Exam Summary: 


Appearance: Pale morbidly obese woman laying on the stretcher in no acute 

distress. 


Skin: Warm, dry, no obvious rash


Eyes: sclera anicteric, conjunctival pallor


ENT: mucous membranes moist, pharynx appears normal.


Neck: Supple, nontender


Respiratory: Clear to auscultation, no signs of respiratory distress. Wet 

sounding ronchi in the right lung without signs of respiratory distress. 


Cardiovascular: Normal S1, S2. No murmurs. Normal distal pulses in tibial and 

radial bilaterally.


Abdomen: Soft, nontender, normal active bowel sounds present


Musculoskeletal: Normal, Strength/ROM Intact


Neurological: A&Ox3, awake and alert, mentation is normal, speech is fluent and 

appropriate


Psychiatric: affect is normal, does not appear anxious or depressed








Triage Information Reviewed: Yes


Vital Signs On Initial Exam: 


 Initial Vitals











Temp Pulse Resp BP Pulse Ox


 


 98.1 F   72   20   130/77   95 


 


 10/28/19 12:05  10/28/19 12:05  10/28/19 12:05  10/28/19 12:05  10/28/19 12:05











Vital Signs Reviewed: Yes





Procedures





- Sedation


Patient Received Moderate/Deep Sedation with Procedure: No





Diagnostics





- Vital Signs


 Vital Signs











  Temp Pulse Resp BP Pulse Ox


 


 10/28/19 12:05  98.1 F  72  20  130/77  95














- Laboratory


Result Diagrams: 


 10/28/19 13:00





 10/28/19 13:00


Lab Statement: Any lab studies that have been ordered have been reviewed, and 

results considered in the medical decision making process.





- EKG


  ** 1339


Cardiac Rate: NL - 65 BPM


EKG Rhythm: Sinus Rhythm


Summary of EKG Findings: NSR at 65 BPM BPM, P waves, QRS complex, and T waves 

are within normal limits, T waves and intervals are normal, no ischemic 

changes. This is a normal EKG. ED Physician has reviewed and interpreted this 

report.





Disposition





- Course


Course Of Treatment: This patient is a 74 year old female brought in by EMS 

presenting to [facility] with a chief complaint of cough. Physical exam revealed Wet 

sounding ronchi in the right lung without signs of respiratory distress. CXR 

and EKG were unremarkable. Labs were unremarkable except Hgb 8.7 L, Hct 28 L, 

MCV 69 L, MCH 21 L, RDW 19 H, Absolute Eos 0.7 H, BUN/Creatinine Ratio 26.9 H, 

Glucose 103 H, Alkaline Phosphatase 120 H, CRP 20.13 H.  A plan for discharge 

was discussed with the patient and she was agreeable with this plan.





- Diagnoses


Provider Diagnoses: 


 Shortness of breath








Discharge ED





- Sign-Out/Discharge


Documenting (check all that apply): Patient Departure





- Discharge Plan


Condition: Good


Disposition: HOME


Patient Education Materials:  Dyspnea (ED)


Referrals: 


Lucy Felix MD [Primary Care Provider] - 


Additional Instructions: 


We did not find any sign on your testing today that the pneumonia has returned. 

Symptoms from pneumonia, particularly the marked fatigue it causes, can linger 

for several months. Please contact Dr. Felix's office for a followup check 

this week; I did contact your office but she was not in this afternoon. 

Nonetheless she can access the records and testing you had today.





- Billing Disposition and Condition


Condition: GOOD


Disposition: Home





- Attestation Statements


Document Initiated by Aster: Yes


Documenting Coryibgiovani: Fernando Cook


Provider For Whom Aster is Documenting (Include Credential): Russell Jorge MD


Scribe Attestation: 


Fernando DODGE, scribed for Russell Jorge MD on 10/29/19 at 0756. 


Scribe Documentation Reviewed: Yes


Provider Attestation: 


The documentation as recorded by the Fernando alonzo accurately 

reflects the service I personally performed and the decisions made by me, 

Russell Jorge MD


Status of Scribe Document: Viewed

## 2020-01-21 ENCOUNTER — HOSPITAL ENCOUNTER (INPATIENT)
Dept: HOSPITAL 25 - ED | Age: 75
LOS: 7 days | Discharge: NURSING, REHAB OR LONG-TERM CARE | DRG: 871 | End: 2020-01-28
Attending: INTERNAL MEDICINE | Admitting: HOSPITALIST
Payer: MEDICARE

## 2020-01-21 DIAGNOSIS — I25.10: ICD-10-CM

## 2020-01-21 DIAGNOSIS — G89.29: ICD-10-CM

## 2020-01-21 DIAGNOSIS — F41.9: ICD-10-CM

## 2020-01-21 DIAGNOSIS — Z79.899: ICD-10-CM

## 2020-01-21 DIAGNOSIS — F32.9: ICD-10-CM

## 2020-01-21 DIAGNOSIS — R79.89: ICD-10-CM

## 2020-01-21 DIAGNOSIS — I42.2: ICD-10-CM

## 2020-01-21 DIAGNOSIS — J44.0: ICD-10-CM

## 2020-01-21 DIAGNOSIS — R65.20: ICD-10-CM

## 2020-01-21 DIAGNOSIS — J15.0: ICD-10-CM

## 2020-01-21 DIAGNOSIS — Z79.890: ICD-10-CM

## 2020-01-21 DIAGNOSIS — E78.00: ICD-10-CM

## 2020-01-21 DIAGNOSIS — M06.9: ICD-10-CM

## 2020-01-21 DIAGNOSIS — Z88.1: ICD-10-CM

## 2020-01-21 DIAGNOSIS — I10: ICD-10-CM

## 2020-01-21 DIAGNOSIS — D63.8: ICD-10-CM

## 2020-01-21 DIAGNOSIS — Z99.3: ICD-10-CM

## 2020-01-21 DIAGNOSIS — E87.2: ICD-10-CM

## 2020-01-21 DIAGNOSIS — Z93.3: ICD-10-CM

## 2020-01-21 DIAGNOSIS — Z66: ICD-10-CM

## 2020-01-21 DIAGNOSIS — Z88.0: ICD-10-CM

## 2020-01-21 DIAGNOSIS — Z87.891: ICD-10-CM

## 2020-01-21 DIAGNOSIS — I24.8: ICD-10-CM

## 2020-01-21 DIAGNOSIS — J96.01: ICD-10-CM

## 2020-01-21 DIAGNOSIS — Z88.2: ICD-10-CM

## 2020-01-21 DIAGNOSIS — A41.9: Primary | ICD-10-CM

## 2020-01-21 DIAGNOSIS — M81.0: ICD-10-CM

## 2020-01-21 DIAGNOSIS — E03.9: ICD-10-CM

## 2020-01-21 LAB
ALBUMIN SERPL BCG-MCNC: 4.2 G/DL (ref 3.2–5.2)
ALBUMIN/GLOB SERPL: 1.6 {RATIO} (ref 1–3)
ALP SERPL-CCNC: 94 U/L (ref 34–104)
ALT SERPL W P-5'-P-CCNC: 13 U/L (ref 7–52)
ANION GAP SERPL CALC-SCNC: 12 MMOL/L (ref 2–11)
AST SERPL-CCNC: 18 U/L (ref 13–39)
BASOPHILS # BLD AUTO: 0.1 10^3/UL (ref 0–0.2)
BUN SERPL-MCNC: 19 MG/DL (ref 6–24)
BUN/CREAT SERPL: 23.5 (ref 8–20)
CALCIUM SERPL-MCNC: 9.2 MG/DL (ref 8.6–10.3)
CHLORIDE SERPL-SCNC: 103 MMOL/L (ref 101–111)
EOSINOPHIL # BLD AUTO: 0.3 10^3/UL (ref 0–0.6)
GLOBULIN SER CALC-MCNC: 2.7 G/DL (ref 2–4)
GLUCOSE SERPL-MCNC: 124 MG/DL (ref 70–100)
HCO3 SERPL-SCNC: 23 MMOL/L (ref 22–32)
HCT VFR BLD AUTO: 35 % (ref 35–47)
HGB BLD-MCNC: 9.9 G/DL (ref 12–16)
LYMPHOCYTES # BLD AUTO: 1.1 10^3/UL (ref 1–4.8)
MCH RBC QN AUTO: 19 PG (ref 27–31)
MCHC RBC AUTO-ENTMCNC: 28 G/DL (ref 31–36)
MCV RBC AUTO: 66 FL (ref 80–97)
MONOCYTES # BLD AUTO: 0.1 10^3/UL (ref 0–0.8)
NEUTROPHILS # BLD AUTO: 6 10^3/UL (ref 1.5–7.7)
NRBC # BLD AUTO: 0 10^3/UL
NRBC BLD QL AUTO: 0.6
PLATELET # BLD AUTO: 520 10^3/UL (ref 150–450)
POTASSIUM SERPL-SCNC: 4 MMOL/L (ref 3.5–5)
PROT SERPL-MCNC: 6.9 G/DL (ref 6.4–8.9)
RBC # BLD AUTO: 5.27 10^6 /UL (ref 3.7–4.87)
SODIUM SERPL-SCNC: 138 MMOL/L (ref 135–145)
TROPONIN I SERPL-MCNC: 0.01 NG/ML (ref ?–0.03)
WBC # BLD AUTO: 7.5 10^3/UL (ref 3.5–10.8)

## 2020-01-21 PROCEDURE — 85025 COMPLETE CBC W/AUTO DIFF WBC: CPT

## 2020-01-21 PROCEDURE — 87070 CULTURE OTHR SPECIMN AEROBIC: CPT

## 2020-01-21 PROCEDURE — 85060 BLOOD SMEAR INTERPRETATION: CPT

## 2020-01-21 PROCEDURE — 80053 COMPREHEN METABOLIC PANEL: CPT

## 2020-01-21 PROCEDURE — 74174 CTA ABD&PLVS W/CONTRAST: CPT

## 2020-01-21 PROCEDURE — 94667 MNPJ CHEST WALL 1ST: CPT

## 2020-01-21 PROCEDURE — P9040 RBC LEUKOREDUCED IRRADIATED: HCPCS

## 2020-01-21 PROCEDURE — 71045 X-RAY EXAM CHEST 1 VIEW: CPT

## 2020-01-21 PROCEDURE — 87186 SC STD MICRODIL/AGAR DIL: CPT

## 2020-01-21 PROCEDURE — 82746 ASSAY OF FOLIC ACID SERUM: CPT

## 2020-01-21 PROCEDURE — 36415 COLL VENOUS BLD VENIPUNCTURE: CPT

## 2020-01-21 PROCEDURE — 86850 RBC ANTIBODY SCREEN: CPT

## 2020-01-21 PROCEDURE — 87077 CULTURE AEROBIC IDENTIFY: CPT

## 2020-01-21 PROCEDURE — 71275 CT ANGIOGRAPHY CHEST: CPT

## 2020-01-21 PROCEDURE — 86900 BLOOD TYPING SEROLOGIC ABO: CPT

## 2020-01-21 PROCEDURE — 83605 ASSAY OF LACTIC ACID: CPT

## 2020-01-21 PROCEDURE — 87205 SMEAR GRAM STAIN: CPT

## 2020-01-21 PROCEDURE — 87040 BLOOD CULTURE FOR BACTERIA: CPT

## 2020-01-21 PROCEDURE — C1751 CATH, INF, PER/CENT/MIDLINE: HCPCS

## 2020-01-21 PROCEDURE — 87899 AGENT NOS ASSAY W/OPTIC: CPT

## 2020-01-21 PROCEDURE — 83880 ASSAY OF NATRIURETIC PEPTIDE: CPT

## 2020-01-21 PROCEDURE — 82728 ASSAY OF FERRITIN: CPT

## 2020-01-21 PROCEDURE — 93306 TTE W/DOPPLER COMPLETE: CPT

## 2020-01-21 PROCEDURE — 93005 ELECTROCARDIOGRAM TRACING: CPT

## 2020-01-21 PROCEDURE — 94640 AIRWAY INHALATION TREATMENT: CPT

## 2020-01-21 PROCEDURE — 80048 BASIC METABOLIC PNL TOTAL CA: CPT

## 2020-01-21 PROCEDURE — 83550 IRON BINDING TEST: CPT

## 2020-01-21 PROCEDURE — 86922 COMPATIBILITY TEST ANTIGLOB: CPT

## 2020-01-21 PROCEDURE — 86901 BLOOD TYPING SEROLOGIC RH(D): CPT

## 2020-01-21 PROCEDURE — 83540 ASSAY OF IRON: CPT

## 2020-01-21 PROCEDURE — 84484 ASSAY OF TROPONIN QUANT: CPT

## 2020-01-21 PROCEDURE — 99285 EMERGENCY DEPT VISIT HI MDM: CPT

## 2020-01-21 PROCEDURE — 82607 VITAMIN B-12: CPT

## 2020-01-21 PROCEDURE — 87641 MR-STAPH DNA AMP PROBE: CPT

## 2020-01-21 RX ADMIN — HEPARIN SODIUM SCH UNITS: 5000 INJECTION INTRAVENOUS; SUBCUTANEOUS at 22:25

## 2020-01-21 RX ADMIN — NOREPINEPHRINE BITARTRATE SCH MLS/HR: 1 INJECTION INTRAVENOUS at 21:20

## 2020-01-21 RX ADMIN — GABAPENTIN SCH: 300 CAPSULE ORAL at 21:02

## 2020-01-21 RX ADMIN — SODIUM CHLORIDE, SODIUM LACTATE, POTASSIUM CHLORIDE, AND CALCIUM CHLORIDE SCH MLS/HR: 600; 310; 30; 20 INJECTION, SOLUTION INTRAVENOUS at 20:08

## 2020-01-21 NOTE — PN
Sepsis Event Evaluation


Date of Evaluation: 01/21/20


Time of Evaluation: 17:54


Current Stage of Sepsis: Severe Sepsis


Vital Signs - Last 12 Hours: 


 Vital Signs - 12 hr











  Temp Pulse Resp BP Pulse Ox


 


 01/21/20 17:36  100.1 F  109  23  149/80  100


 


 01/21/20 17:32   106  20  86/57  100


 


 01/21/20 17:26   106  17  149/80  93


 


 01/21/20 17:05   121  13  107/84  99


 


 01/21/20 17:00  98.7 F  115  21  96/64  97


 


 01/21/20 16:34   108  20  96/64  98


 


 01/21/20 16:08   112  17  112/68  96


 


 01/21/20 16:00   107  20   96


 


 01/21/20 15:00   122  21   96


 


 01/21/20 14:37   119    83


 


 01/21/20 14:36  98.7 F  118  22  155/91  82


 


 01/21/20 14:34   123   155/91  78











Lactic Acid: 


 











  01/21/20





  14:58


 


Lactic Acid  4.1 H*














- Cardiopulmonary Exam


Capillary Refill: Immediate


Respiratory: Symmetrical Chest Expansion and Respiratory Effort, - - BS+ 

bilaterally with bibasilar crackles


Cardiovascular: RRR - Normal S1 and S2, +SM





- Peripheral Pulse Exam


Radial Pulses: Bilateral Normal





- Skin Exam


Skin Exam: Normal Turgor





- [place] Coma Scale


Best Eye Response: 4 - Spontaneous


Best Motor Response: 6 - Obeys Commands


Best Verbal Response: 5 - Oriented


Coma Scale Total: 15





Assess/Plan/Problems-Billing


Assessment: 


Mrs Gage is a 73yo F with PMH of rheumatoid arthritis, chronic pain, CAD, 

HTN, hypothyroidism, who presented to ED with dyspnea, found to have severe 

sepsis secondary to pneumonia.


Patient is admitted to ICU, will transfer care to Dr Joe. 


Will continue to monitor closely.

## 2020-01-21 NOTE — PN
Hospitalist Progress Note


Date of Service: 01/21/20





ON-CALL MD NOTE:





Was called by the RN for hypotension earlier, 500cc bolus was ordered, and 

levophed drip was ordered in case bolus did not work.


Spoke with Dr. Joe - regarding this, and for placement of central line, he 

suggested we can run levophed through the peripheral line.





ER physician attempted central access- patient only willing to get femoral line

, does not want neck or chest to be touched.  was unable to get Central line 

placed.


at this point continue levophed peripherally.

## 2020-01-21 NOTE — ED
Shortness of Breath





- HPI Summary


HPI Summary: 





Patient is a 75 y/o F presenting to the ED via EMS for a chief complaint of 

shortness of breath. Per EMS, patient also noted chest pain rated as 10/10 in 

severity for which she was given NTG without relief. She also reports a cough 

with clear phlegm. PMHx is significant for COPD, pneumonia, rheumatoid arthritis

, CAD, and hypertrophic cardiomyopathy. Patient had a Chest CT on 01/03/20 for 

PNA and was seen at [facility] in August 2019. Patient is a resident at [facility]. 

She reports cough. Denies fever. DNR/DNI per Rn. On EMS arrival 70% on RA, 

placed on NRB.








- History of Current Complaint


Time Seen by Provider: 01/21/20 14:33


Hx Obtained From: Patient, EMS


Onset/Duration: Still Present


Timing: Constant


Current Severity: Moderate


Dyspnea At: Rest


Aggravating Factors: Nothing


Alleviating Factors: Nothing


Associated Signs & Symptoms: Cough (Productive) - With clear phlegm, Chest Pain 

Unrelated to Cough


Related History: Obesity





- Allergy/Home Medications


Allergies/Adverse Reactions: 


 Allergies











Allergy/AdvReac Type Severity Reaction Status Date / Time


 


cephalexin Allergy  Unknown Verified 12/04/19 09:39





   Reaction  





   Details  


 


Penicillins Allergy  Unknown Verified 12/04/19 09:39





   Reaction  





   Details  


 


Sulfa (Sulfonamide Allergy  Unknown Verified 12/04/19 09:39





Antibiotics)   Reaction  





   Details  











Home Medications: 


 Home Medications





Mag Hydrox/Aluminum Hyd/Simeth [Mintox Suspension] 15 ml PO Q4H PRN 01/21/20 [

History Confirmed 01/21/20]


Transparent Dressing [Tegaderm] 1 patch TOPICAL WEEKLY 01/21/20 [History 

Confirmed 01/21/20]











PMH/Surg Hx/FS Hx/Imm Hx


Previously Healthy: Yes


Endocrine/Hematology History: 


   Denies: Hx Diabetes


Cardiovascular History: Reports: Hx Coronary Artery Disease, Hx 

Hypercholesterolemia, Hx Hypertension, Other Cardiovascular Problems/Disorders 

- AAA, hypertrophic cardimyopathy


Respiratory History: Reports: Hx Chronic Obstructive Pulmonary Disease (COPD), 

Hx Pneumonia


 History: 


   Denies: Hx Renal Disease


Musculoskeletal History: Reports: Hx Arthritis - R.A., Hx Rheumatoid Arthritis, 

Hx Back Problems, Hx Osteoporosis


Sensory History: Reports: Hx Contacts or Glasses


   Denies: Hx Eye Prosthesis, Hx Legally Blind, Hx Deafness, Hx Hearing Aid


Opthamlomology History: Reports: Hx Contacts or Glasses


   Denies: Hx Eye Prosthesis, Hx Legally Blind


EENT History: 


   Denies: Hx Deafness


Neurological History: 


   Denies: Hx Developmental Delay


Psychiatric History: 


   Denies: Hx Autism





- Surgical History


Surgical History: Yes


Surgery Procedure, Year, and Place: Back surgery x2


Infectious Disease History: No





- Family History


Known Family History: 


   Negative: Seizure Disorder





- Social History


Occupation: Retired


Lives: Assisted Living


Alcohol Use: None


Hx Substance Use: No


Substance Use Type: Reports: None


Hx Tobacco Use: Yes


Smoking Status (MU): Former Smoker





Review of Systems


Positive: Chest Pain


Positive: Shortness Of Breath, Cough - With clear phlegm


All Other Systems Reviewed And Are Negative: Yes





Physical Exam





- Summary


Physical Exam Summary: 





Constitutional: Ill-appearing, Moderate distress. 


Skin: Warm, Dry


HENT: Normocephalic; Atraumatic


Eyes: Conjunctiva normal


Neck: Musculoskeletal ROM normal neck. (-) JVD, (-) Stridor, (-) Nuchal rigidity


Cardio: Rhythm regular, tachycardia, Heart sounds normal; Intact distal pulses; 

Radial pulses are 2+ and symmetric. (-) Murmur


Pulmonary/Chest wall: Bilateral rhonchi in all lung fields, increased workup 

breathing, moderate distress. 


Abd: Soft, (-) tenderness, (-) Distension, (-) Guarding, (-) Rebound


Musculoskeletal: (+) LE Edema


Lymph: (-) Cervical adenopathy


Neuro: Alert, Oriented x3


Psych: Mood and affect Normal


Triage Information Reviewed: Yes


Vital Signs Reviewed: Yes





Procedures





- Sedation


Patient Received Moderate/Deep Sedation with Procedure: No





Diagnostics





- Laboratory


Result Diagrams: 


 01/22/20 03:32





 01/22/20 03:32


Lab Statement: Any lab studies that have been ordered have been reviewed, and 

results considered in the medical decision making process.





- Radiology


  ** Chest X-ray


Radiology Interpretation Completed By: Radiologist


Summary of Radiographic Findings: Chest X-ray IMPRESSION: INTERVAL DEVELOPMENT 

OF DIFFUSE CONSOLIDATION OF THE RIGHT LUNG AND LEFT LOWER LUNG. RECOMMEND FOLLOW

-UP UNTIL RESOLUTION TO EXCLUDE UNDERLYING PULMONARY PARENCHYMAL PATHOLOGY.  

Reviewed by Dr. Ravi.





- CT


  ** Chest/Abdomen/Pelvis CTA


CT Interpretation Completed By: Radiologist


Summary of CT Findings: Chest/Abdomen/Pelvis CTA IMPRESSION:  1.  NO PULMONARY 

ARTERIAL FILLING DEFECT TO SUGGEST PULMONARY EMBOLISM.  2.  NO AORTIC INTIMAL 

FLAP TO SUGGEST DISSECTION.  3.  EXTENSIVE AIRSPACE DISEASE THROUGHOUT THE 

RIGHT LUNG AND IN THE LEFT LUNG BASE.  4.  AGAIN NOTED ARE MILD FIBROTIC 

CHANGES.  5.  INFRARENAL ABDOMINAL AORTIC ANEURYSM MEASURING UP TO 4.5 CM, 

SLIGHTLY INCREASED IN SIZE FROM JUNE 01, 2019.  6.  STATUS POST COLOSTOMY.  7.  

DIVERTICULOSIS.  8.  RECTOCYSTOCELE.  9.  ATHEROSCLEROSIS.  Reviewed by Dr. Ravi.





- EKG


  ** 14:41


Cardiac Rate: Tachycardia - 120 BPM


EKG Rhythm: Sinus Tachycardia


ST Segment: Normal


Ectopy: None


Summary of EKG Findings: An EKG at 14:41 reveals sinus tachycardia with 120 BPM

, ST depressions in V1, aVL, V5, V6, significant artefact, nml axis, nml 

intervals. No STEMI. No acute changes.  ED physician has reviewed and 

interpreted this EKG.





  ** 15:00


Cardiac Rate: Tachycardia - 114 BPM


EKG Rhythm: Sinus Tachycardia


ST Segment: Normal


Ectopy: None


Summary of EKG Findings: An EKG at 15:00 reveals sinus tachycardia with 114 BPM

, ST depressions in V1 and aVL, nml axis, nml intervals. No STEMI. No acute 

changes. No significant change from prior.  ED physician has reviewed and 

interpreted this EKG.





Re-Evaluation





- Re-Evaluation


  ** First Eval


Re-Evaluation Time: 14:56


Change: Unchanged


Comment: At 14:56, patient states she had left-sided chest pain that felt 

similar to pneumonia that radiated to her back.





Course/Dx





- Course


Course Of Treatment: 75 y/o F w hx COPD, RA, cardiomyopathy who is presenting 

with shortness breath and chest pain.  - on arrival, patient is, satting 70% on 

room air.  Placed on oxy mask 10L. bilateral rhonchi, right greater than left.  

Afebrile.  Tachycardic. BP stable.  - labs notable for normal white count, 

troponin negative.  EKG tachycardic with some ST depressions laterally.  X-ray 

w infiltrates of the right side >left, CTA dissection does not show any acute 

dissection, shows chronic aneurysm.  - Patient covered broadly for pneumonia 

with Aztreonam and Levaquin, 30 cc per KG bolus of fluids, given LA 4.  BC 

obtained. Admit to ICU





- Diagnoses


Provider Diagnoses: 


 Sepsis, Hypoxia, Respiratory distress, Pneumonia








- Physician Notifications


Discussed Care of Patient With: Hair Joe - At 15:51, Dr. Joe recommends 

the hospitalist consult for patients admission. At 16:10, Dr. Leonor Bautista 

agrees to admit the patient to [facility], pending CTA read.  


Time Discussed With Above Provider: 15:51


Instructed by Provider To: Admit As Inpatient





- Critical Care Time


Critical Care Time: 30-74 min - Upon my evaluation, this patient had a high 

probability of imminent or life-threatening deterioration due to respiratory 

failure, sepsis,  which required my direct attention, intervention, and 

personal management. I have personally provided 35 minutes of critical care 

time exclusive of time spent on separately billable procedures. Time includes 

review of laboratory data, radiology results, discussion with consultants, and 

monitoring for potential decompensation. Interventions were performed as 

documented above.





Discharge ED





- Sign-Out/Discharge


Documenting (check all that apply): Patient Departure - Admit





- Discharge Plan


Condition: Stable


Disposition: ADMITTED TO [facility]





- Billing Disposition and Condition


Condition: STABLE


Disposition: Admitted to [facility]





- Attestation Statements


Document Initiated by Scribe: Yes


Documenting Scribe: Miranda Eduardo


Provider For Whom Aster is Documenting (Include Credential): Lauro Ravi MD


Scribe Attestation: 


I, Miranda Eduardo, scribed for Lauro Ravi MD on 01/22/20 at 1127. 


Scribe Documentation Reviewed: Yes


Provider Attestation: 


The documentation as recorded by the horaceibMiranda matute accurately reflects 

the service I personally performed and the decisions made by me, Lauro Ravi MD


Status of Scribe Document: Viewed

## 2020-01-21 NOTE — HP
CC:  Dr. Felix; Dr. Joe *

 

HISTORY AND PHYSICAL:

 

DATE OF ADMISSION:  20

 

TIME OF EVALUATION:  4:35 p.m.

 

PRIMARY CARE PROVIDER:  Dr. Felix.

 

CONSULTING CRITICAL CARE PHYSICIAN:  Dr. Joe.

 

CHIEF COMPLAINT:  "I don't feel well."

 

HISTORY OF PRESENT ILLNESS:  Ms. Gage is a 74-year-old female with a past 
medical history of rheumatoid arthritis; chronic pain; diverticulitis, status 
post ostomy; anxiety; depression; hypertension; hypothyroidism; coronary heart 
disease; hypertrophic cardiomyopathy; prior admissions for pneumonia, who 
presents to the emergency room with complaints of not feeling well and 
shortness of breath.

 

The patient states she came down with a cold about a week ago and although she 
was feeling sick, she could still manage at home.  She had some productive 
cough with clear sputum and she had some mild shortness of breath.  She denied 
chest pain, palpitations, fever.  She did have body aches.  No fever, but 
chills and also anorexia.  She states that she was feeling "okay" until this 
morning when she woke up feeling very poor.  She states that she could barely 
get up from bed.  She called a nurse at [place] and EMS was called.  As per ED 
nurse report, the patient was found to have an oxygen saturation in the 70s 
while in the field and was placed on OxyMask with improvement of her oxygen 
saturation to 96% with 10 L of oxygen via OxyMask.

 

The patient is very clear that she wants to be a do not resuscitate and do not 
intubate.  The hospitalist service was called for evaluation.

 

The patient also complains of left shoulder pain.  She states this is a chronic 
issue for her and she is sleeping more on her left side at home to try and 
breathe better, so the pain is worse today.

 

PAST MEDICAL HISTORY:

1.  Rheumatoid arthritis, on methotrexate and Plaquenil.

2.  Chronic pain.

3.  Diverticulitis, status post colostomy.

4.  Anxiety.

5.  Depression.

6.  Hypertension.

7.  Hypothyroidism.

8.  Coronary artery disease.

9.  Heart murmur secondary to hypertrophic cardiomyopathy with dynamic left 
ventricular outflow tract obstruction.

10.  Admission with severe sepsis and pneumonia in [place] in .

11.  Admission to [facility] with pneumonia in summer 2019.

 

MEDICATION LIST:

1.  Acetaminophen/codeine 300/30 mg 2 tablets p.o. b.i.d. as needed for pain.

2.  Albuterol HFA 2 puffs inhaled 4 times daily p.r.n. shortness of breath and 
wheezing.

3.  Alprazolam 0.25 mg p.o. b.i.d. as needed for anxiety.

4.  Atorvastatin 40 mg p.o. daily.

5.  Butrans patch 15 mcg topical q.7 days.

6.  Calcium plus vitamin D 1 tablet p.o. b.i.d.

7.  Cyclobenzaprine 5 mg p.o. b.i.d. as needed for spasms.

8.  Voltaren gel topical 4 times daily as needed for pain.

9.  Duloxetine DR 20 mg p.o. daily.

10.  Folic acid 1 mg p.o. daily.

11.  Gabapentin 300 mg p.o. t.i.d.

12.  Hydroxychloroquine 200 mg p.o. daily.

13.  Ibuprofen 400 mg p.o. q.8 hours p.r.n. pain.

14.  Levothyroxine 112 mcg p.o. daily.

15.  Magnesium hydroxide/aluminum hydroxide 15 mL p.o. q.4 hours p.r.n. 
heartburn.

16.  Methotrexate 5 mg p.o. on .

17.  Metoprolol tartrate 50 mg p.o. b.i.d.

18.  Nabumetone 500 mg p.o. t.i.d.

19.  Omeprazole 20 mg p.o. daily.

20.  Potassium chloride 10 mEq p.o. b.i.d.

21.  Simethicone 80 mg p.o. q.6 hours p.r.n. gas.

22.  Verapamil 180 mg p.o. daily.

 

ALLERGIES:  The patient is allergic CEPHALEXIN, PENICILLINS, and SULFA; the 
patient has hives and shortness of breath.

 

FAMILY HISTORY:  Both parents had a history of heart disease.  Father had 
coronary artery bypass and pacemaker, and both parents  in their 80s.

 

SOCIAL HISTORY:  The patient was a smoker from age 15 to 68, half-a-pack a day. 
She then started smoking small cigars, but she has completely quit for the past 
7 years.  No history of alcohol or drug use.  Surrogate decision maker is her 
son, Robert Gage, phone number is [phone].

 

REVIEW OF SYSTEMS:  A 14-point review of systems was performed and all the 
pertinent negative and positive findings are in the HPI.

 

                               PHYSICAL EXAMINATION

 

GENERAL:  The patient is a morbidly obese, elderly lady, disheveled in 
appearance, lying in the ED stretcher, appears to be uncomfortable.

 

VITAL SIGNS:  Temperature 98.7, heart rate is 107, respiratory rate is 20, 
oxygen saturation is 96% on 10 L OxyMask, blood pressure is 96/71.

 

HEENT:  Pupils are equal.  Dry mucous membranes.

 

CHEST:  Breath sounds present bilaterally with crackles in the right base.  No 
wheezing or rhonchi.

 

CVS:  Normal S1, S2.  Regular rate and rhythm with systolic murmur.

 

ABDOMEN:  Morbidly obese with a functioning colostomy in place.  Bowel sounds 
are present.

 

EXTREMITIES:  No edema.  The patient has limited passive and active range of 
motion of the left shoulder.  There is no significant edema, erythema of the 
joint at this time to suggest an infectious joint process.

 

NEURO:  She is alert and oriented x3.  Able to move all 4 extremities.

 

 DIAGNOSTIC STUDIES/LAB DATA:  The patient had a CBC that showed WBC of 7.5, 
hemoglobin 9.9, hematocrit 35, platelets of 520 with 79% neutrophils. Chemistry 
showed a sodium of 138, potassium of 4, chloride of 103, bicarb of 23, anion 
gap of 12, BUN of 19, creatinine of 0.91, glucose of 124, lactic acid is 4.1, 
calcium is 9.2.  LFTs are normal.  Troponin is 0.01.  BNP is 78.

 

Chest x-ray was personally reviewed and it shows interval development of 
diffuse consolidation of the right lung and left lower lung.

 

CTA of the chest, abdomen and pelvis was also personally reviewed and it shows 
no pulmonary arterial filling defect to suggest pulmonary embolism.  No aortic 
intimal flap to suggest dissection.  Extensive airspace disease throughout the 
right lung and in the left lung base with mild fibrotic changes.  Infrarenal 
abdominal aortic aneurysm measuring up to 4.5 cm, slightly increased in size 
from 2019.  Status post colostomy, diverticulosis, recto/cystocele, and 
atherosclerosis.

 

EKG done on 20 at 2:41 p.m. showed sinus tachycardia at 120 beats per 
minute with ST depressions in the lateral leads that are new when compared to 
her prior EKG from 2019.  She had another EKG done on 20 at 

3 p.m. with similar changes.

 

ASSESSMENT AND PLAN:  Ms. Gage is a 74-year-old female with a past medical 
history that includes rheumatoid arthritis, on methotrexate and Plaquenil; 
chronic pain; diverticulitis, status post ostomy; anxiety; depression; 
hypertension; hypothyroidism; coronary artery disease with remote history of 
myocardial infarction; heart murmur secondary to hypertrophic cardiomyopathy 
with dynamic left ventricular outflow tract obstruction; prior admissions for 
pneumonia in 2017 and 2019, who presents to the emergency room with 1 week of 
"cold" with productive cough and shortness of breath, worsened this morning, 
found to have severe sepsis secondary to community-acquired pneumonia.

1.  Severe sepsis.  The patient's presentation is compatible with sepsis 
considering her tachycardia and tachypnea.  As she is immunosuppressed due to 
her rheumatoid arthritis and methotrexate use, I would not expect her to mount 
a significant leukocytosis.  Her initial lactic acid is 4.1 and the source of 
her severe sepsis is community-acquired pneumonia.  The patient received normal 
saline 30 mL/kg in the emergency room and on arrival to the ED, her blood 
pressure was normal, but it has progressed to a systolic in the 90s, so we will 
have to watch her closely in the intensive care unit as I am concerned she will 
probably progress to septic shock.  We will check serial lactic acid levels.  A 
critical care consultation was requested with Dr. Joe, who will be taking 
over her care.

2.  Community-acquired pneumonia.  The patient already had blood cultures 
ordered in the emergency room as well as sputum cultures.  We will check 
legionella and pneumococcal antigens.  Due to her multiple antibiotic allergies
, the patient was started on aztreonam and levofloxacin in the emergency room 
and she will be continued on this regimen pending culture results.

3.  Acute hypoxemic respiratory failure.  On EMS arrival, the patient's oxygen 
saturation was reportedly in the 70s.  She now has a saturation of 96% on 10 L 
OxyMask, but I am concerned that as we give her more fluids her respiratory 
status may deteriorate and she may require Vapotherm.  I had a lengthy 
conversation with the patient at bedside.  She is very clear that she would not 
want to be intubated. Considering her infiltrate, I do not think she would 
tolerate BiPAP, so the plan is to watch her in the intensive care unit and 
advance to Vapotherm depending on her respiratory status.

4.  Lactic acidosis.  Secondary to severe sepsis.  The patient will receive 
fluid resuscitation and we will check serial lactic acids q.4 hours until 
normalization.

5.  Anemia, suspect anemia of chronic disease.  The patient's hemoglobin is at 
her baseline.  We will send anemia workup.

6.  Left shoulder pain.  I suspect this is chronic and associated with her 
rheumatoid arthritis.  She had a bilateral shoulder x-ray done in 2018 
and at that point, the patient already had calcifications in both joint spaces 
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 
left glenohumeral joint.  On physical examination, I do not see any signs of 
active arthritis or suggestion of infection, but we will check left shoulder x-
ray to see if there are any other evident acute processes.  I suspect her pain 
is probably secondary to her known degenerative disease and the fact that she 
has been lying more on her left side so she could breathe better.  Depending on 
the results and her clinical course, she may need further imaging of her joint 
as well as may be an orthopedic consultation.  We will continue pain management.

7.  Chronic pain.  We will continue her usual regimen with acetaminophen/codeine
, Butrans patch, gabapentin, and we will add morphine IV for now as needed.

8.  Coronary artery disease.  The patient's EKG does show some signs of ST 
depression in the lateral leads and this may be representative of demand 
ischemia in the setting of severe sepsis.  Her first troponin was negative and 
we will check 3 troponins and follow the trend.

9.  Anxiety and depression.  We will continue her alprazolam p.r.n. as well as 
duloxetine.

10.  DVT prophylaxis:  The patient has a score of 3 on the DVT Prophylaxis Risk 
Assessment Guide and she will be started on subcutaneous heparin.

11.  Code status was discussed with the patient.  She wishes to be a do not 
resuscitate and do not intubate and she has a MOLST form that reflects those 
wishes.  The form was printed and signed. Dr. Joe was consulted and will be 
taking over the patient's care.  He also contacted the patient's son and gave 
him an update about the patient's condition.

 

TIME SPENT:  Approximately 70 minutes of critical care time was spent to 
complete this admission.

 

 

 

[id number]/CPS #: [id number]

JUSTIN

## 2020-01-21 NOTE — XMS REPORT
Continuity of Care Document (CCD)

 Created on:2019



Patient:Jyoti Gage

Sex:Female

:1945

External Reference #:[id number]





Demographics







 Address  [address] [place] [address]

 

 Home Phone  [phone]

 

 Email Address  [email or web address]

 

 Preferred Language  en

 

 Marital Status  Not  or 

 

 Worship Affiliation  Unknown

 

 Race  White

 

 Ethnic Group  Not  or 









Author







 Name  Charlotte Watson MD (transmitted by agent of provider Fatou Lewis)

 

 Address  [address], [address]



   [place] [address]









Care Team Providers







 Name  Role  Phone

 

 Lucy Felix M.D. - Family Medicine  Care Team Information   [phone]
[phone]









Problems







 Active Problems  Provider  Date

 

 Rheumatoid arthritis  Terra Lala M.D.  Onset: 10/10/2018

 

 Essential hypertension  Terra Lala M.D.  Onset: 10/10/2018

 

 Colostomy present  Terra Lala M.D.  Onset: 10/10/2018

 

 Low back pain  Terra Lala M.D.  Onset: 10/10/2018









 Note: s/p burst fx and fusion surgery









 Mild chronic obstructive pulmonary disease  Lucy Felix MD  Onset: 10/10/2018

 

 Localized, primary osteoarthritis of the shoulder  Tye Mari MD  Onset: 2018



 region    

 

 Localized, primary osteoarthritis  Tye Mari MD  Onset: 2018

 

 Hypothyroidism  Lucy Felix MD  Onset: 2019







Social History







 Type  Date  Description  Comments

 

 Birth Sex    Unknown  

 

 ETOH Use    Drinks Alcoholic  



     Beverages Rarely  

 

 Tobacco Use  Start: Unknown End:  Patient is a former  1/2 PPD, started



     smoker  age 15, quit age 67

 

 Recreational Drug Use    Denies Drug Use  

 

 Smoking Status  Reviewed: 19  Patient is a former  1/2 PPD, started



     smoker  age 15, quit age 67

 

 Exercise Type/Frequency    Does not exercise  







Allergies, Adverse Reactions, Alerts







 Active Allergies  Reaction  Severity  Comments  Date

 

 Penicillin  Anaphylaxis      10/10/2018

 

 Keflex  Urticaria      10/10/2018

 

 Sulfa Antibiotics  Urticaria, Difficulty breathing      10/10/2018







Medications







 Active Medications  SIG  Qnty  Indications  Ordering  Date



         Provider  

 

 Mintox Regular  Take 15 ml by mouth  355ml    Terra  2019



 Strength  every 4 hs as      VANE Lala  



   needed for        



 899-205-16bg/5ML  heartburn        



 Suspension          



           

 

 Calcium 600+D  One PO bid  60tabs    Lucy Felix MD  2019



           



 600-400mg-Unit          



 Tablets          



           

 

 Cyclobenzaprine HCL  by mouth twice a  30tabs    Other Ordering  2019



                   5mg  day as needed      Provider  



 Tablets          



           

 

 Pain Relieving  apply one patch to  18units    Other Ordering  2019



 Lidocaine Patch  shoulder daily as      Provider  



               4%  needed        



 Patches          



           

 

 Shingrix  0.5 milliliters  1units  Z23  Lucy Felix MD  2019



        50mcg/0.5ML  intramuscular now        



 Suspension Rec  and 1-2 months        



   later repeat        

 

 Plaquenil  1 by mouth every  90tabs  Z79.899  Migue Wolfe,  2019



         200mg Tablets  day      M.DStephane  



           









 M06.09









 Wheelchair PT/OT Evaluation        Tye Mari,  2019



         MD  

 

 Motorized Wheelchair  Disp 1 motorized    M19.012  Tye Mari,  2019



   wheelchair Ht 66 Wt      MD  



   197  Date of        



   Surgery: 19        



   History:  Dian        



   is a 89 fem        

 

 Omeprazole  1 by mouth every  90caps  R12  Lucy Felix,  2019



    20mg Capsules DR  day      MD  



           

 

 Convatec Waffers 2 1/4"  change twice weekly  12units    Lucy Felix,  2019



   z93.3      MD  

 

 Convatec Drainage Bags For  change twice weekly  12units    Lucy Felix,  



 2 1/4 " Waffer  Dx z93.3      MD  

 

 Tad-Fit Natura Drainable  change at least  12units    Geovanna Hill,  2018



 Pouch/Stomahesive  twice a week      MD  



 Wafer/100mm          



     Pouch Misc          



           

 

 Ibuprofen 200  2 tabs by mouth  120tabs    Lucy Felix,  2018



       200mg Tablets  every 8 hours as      MD  



   needed pain        

 

 Tad-Fit Natura Drainable  change at least  12units  Z93.3  Lucy Felix,  2018



 Pouch/Stomahesive  twice a week      MD  



 Wafer/100mm          



     Pouch Misc          



           

 

 Tad-Fit Natura  change at least  12units  Z93.3  Lucy Felix,  2018



 Durahesivemoldable Convex  twice a week      MD  



 Skin Barrier          



       Misc          

 

 Tad-Fit Natura Drainable  change at least  10units  Z93.3  Lucy Felix,  2018



 Pouch 1-3/4"(45mm)  twice a week      MD  



            Pouch Misc          



           

 

 Nabumetone  1 tab three times a  90tabs  G89.4  Fernanda  10/17/2018



    500mg Tablets  day after meals      VANE Echols  



           

 

 Cymbalta  1 by mouth every  90caps  G89.4  Lucy Felix,  10/17/2018



  20mg Caps DR Part  day      MD  



           

 

 Acetaminophen-Codeine #3  2 by mouth bid and  120tabs  G89.4  Lucy Felix,  10/




                  300-30mg  2 tab as needed in      MD  



 Tablets  the afternoon        

 

 Xanax  one by mouth twice  60tabs    Lucy Felix,  



 0.25mg Tablets  daily as needed for      MD  



   anxiety        

 

 Diclofenac Sodium  apply to left knee      Unknown  



           1% Gel  and both shoulders        



   topically 4 times        



   daily as needed        

 

 Simethicone  1 tab every 6 hours      Unknown  



     80mg Chewtabs  as needed bloating        



           

 

 Levothyroxine Sodium  1 by mouth every      Unknown  



              112mcg Tablets  day        



           

 

 Buprenorphine  Apply one patch      Gigi,  



       15mcg/HR Patches  once a week      Richie Patel        FNP-BC  

 

 Hydrochlorothiazide  Take 1 Tablet By  30tabs    Lucy Felix,  



             25mg Tablets  Mouth Daily For      MD  



   Hypertension        

 

 Ventolin HFA  2 puffs by mouth  8gm    Lucy Felix,  



      108(90Base) mcg/Act  four times a day as      MD  



 Aerosol  needed        

 

 Metoprolol Tartrate  1 by mouth twice a  180tabs    Fernanda  



             50mg Tablets  day      VANE Echols  



           

 

 Gabapentin  1 by mouth three  270caps    Lucy Felix,  



    300mg Capsules  times a day      MD  



           

 

 Folic Acid  1 by mouth every  90tabs    Lucy Felix,  



    1mg Tablets  day      MD  



           

 

 Potassium Chloride ER  1 by mouth twice  180caps    Lucy Felix,  



               10Meq  daily      MD  



 Capsules ER          



           

 

 Zantac 75  take 2 tab by mouth  60tabs    Lucy Felix,  



   75mg Tablets  at at bedtime      MD Harden  15 milliliters  355ml    Lucy Felix,  



 788-020-43ha/5ML Suspension  every 4 hours as      MD  



   needed for        



   heartburn or        



   indigestion        

 

 Verapamil HCL ER  1 by mouth every  90caps    Lucy Felix,  



          180mg Caps ER 24HR  day      MD  



           

 

 Methotrexate  2 tbs by mouth on  12tabs  M06.09  Zsofia Huy,  



      2.5mg Tablets  , for      FNP  



   rheumatoid        



   arthritis        

 

 Atorvastatin Calcium  1 by mouth every  90tabs    Lucy Felix,  



              40mg Tablets  day      MD  



           









 History Medications









 Nystatin  apply to  3units    Terra  2019 -



        100MU Powder  affected areas      VANE Lala  2019



   two to  three        



   times a day        

 

 Oyster Shell Calcium  take one tablet  180tabs    Lucy Felix MD  10/11/2019 -



   by mouth twice a        10/03/2019



 500mg Tablets  day        



           

 

 Doxycycline Hyclate  1 by mouth twice  20tabs  J18.9  Lucy Felix MD  10/11/
2019 -



   a day x 10 days        2019



 100mg Tablets DR          



           

 

 Xanax  1 tab by mouth  60tabs  F41.9  Lucy Felix MD  2019 -



     0.25mg Tablets  twice a day as        09/10/2019



   needed for        



   anxiety        

 

 Ciprofloxacin HCL  1 tab daily for  7tabs    Other Ordering  2019 -



                 750mg  7 days      Provider  2019



 Tablets          



           

 

 Levothyroxine Sodium  1 by mouth every  30tabs    Lucy Felix MD  2019 -



   day        2019



 125mcg Tablets          



           

 

 Diclofenac Sodium  apply not more  100units    Luyc Felix MD  2019 -



                 1%  than 2 grams to        2019



 Gel  joints 4 times a        



   day as needed        

 

 Levothyroxine Sodium  1 by mouth every  90tabs  E03.9  Lucy Felix MD  2019 -



   day, on empty        2019



 112mcg Tablets  stomach. wait 1        



   hour to eat, 2        



   hours to take        



   any vitamins or        



   calcium rich        



   foods        







Medications Administered in Office







 Medication  SIG  Qnty  Indications  Ordering Provider  Date

 

 Triamcinolone (Kenalog)        Tye Mari MD  2019



                Injection          



           

 

 Triamcinolone (Kenalog)        Tye Mari MD  2019



                Injection          



           

 

 Triamcinolone (Kenalog)        Tye Mari MD  2019



                Injection          



           

 

 Inj, Regadenoson, 0.1 MG        Hair Joe, DO [facility]  2019



                 Injection          



           

 

 Technetium TC 99M        Hair Joe, DO [facility]  2019



 Tetrofosmin, Per Unit Dose Up          



 To 40 Millicuries          



          Injection          



           

 

 Technetium TC 99M        Hair Joe, DO [facility]  2019



 Tetrofosmin, Per Unit Dose Up          



 To 40 Millicuries          



          Injection          



           

 

 Triamcinolone (Kenalog)        Tye Mari MD  2019



                Injection          



           

 

 Triamcinolone (Kenalog)        Tye Mari MD  2019



                Injection          



           

 

 Triamcinolone (Kenalog)        Tye Mari MD  2019



                Injection          



           

 

 Triamcinolone (Kenalog)        Tye Mari MD  2018



                Injection          



           

 

 Triamcinolone (Kenalog)        Tye Mari MD  2018



                Injection          



           

 

 PPD        Lucy Felix MD  10/10/2018



 Injection          

 

 Influenza,Unspecified        Unknown  2018



              Injection          



           







Immunizations







 CPT Code  Status  Date  Vaccine  Reaction  Lot #

 

 [id number]  Given  10/24/2019  Influenza Virus Vaccine,  Pt tolerated well with  
[id number]



       Quadrivalent (Cciiv4),  no immediate adverse  



       Derived From Cell  reaction  







Vital Signs







 Date  Vital  Result  Comment

 

 2019  9:40am  Height  66 inches  5'6"









 Weight  219.00 lb  

 

 Heart Rate  54 /min  

 

 BP Systolic Sitting  122 mmHg  

 

 BP Diastolic Sitting  78 mmHg  

 

 O2 % BldC Oximetry  92 %  

 

 BMI (Body Mass Index)  35.3 kg/m2  









 2019  8:43am  Height  66 inches  5'6"









 Weight  217.00 lb  

 

 Heart Rate  63 /min  

 

 BP Systolic Sitting  158 mmHg  Rue lg cuff

 

 BP Diastolic Sitting  85 mmHg  Rue lg cuff

 

 O2 % BldC Oximetry  94 %  

 

 BMI (Body Mass Index)  35.0 kg/m2  







Results







 Test  Acquired Date  Facility  Test  Result  H/L  Range  Note

 

 Laboratory test  10/28/2019  [facility]  Lactic Acid  2.0 mmol/L  
Normal  0.5-2.0  1



 finding    [address]          



     [place] [address] [phone]          

 

 CBC Auto Diff  10/28/2019  [facility]  White Blood  5.6 10^3/uL  
Normal  3.5-10.8  



     [address]  Count        



     [place] [address] [phone]          









 Red Blood Count  4.14 10^6/uL  Normal  3.70-4.87  

 

 Hemoglobin  8.7 g/dL  Low  12.0-16.0  

 

 Hematocrit  28 %  Low  35-47  

 

 Mean Corpuscular Volume  69 fL  Low  80-97  

 

 Mean Corpuscular Hemoglobin  21 pg  Low  27-31  

 

 Mean Corpuscular HGB Conc  31 g/dL  Normal  31-36  

 

 Red Cell Distribution Width  19 %  High  10-15  

 

 Platelet Count  333 10^3/uL  Normal  150-450  

 

 Mean Platelet Volume  8.2 fL  Normal  7.4-10.4  

 

 Abs Neutrophils  2.8 10^3/uL  Normal  1.5-7.7  

 

 Abs Lymphocytes  1.3 10^3/uL  Normal  1.0-4.8  

 

 Abs Monocytes  0.6 10^3/uL  Normal  0-0.8  

 

 Abs Eosinophils  0.7 10^3/uL  High  0-0.6  

 

 Abs Basophils  0.2 10^3/uL  Normal  0-0.2  

 

 Abs Nucleated RBC  0.0 10^3/uL      

 

 Granulocyte %  50.1 %      

 

 Lymphocyte %  24.1 %      

 

 Monocyte %  10.5 %      

 

 Eosinophil %  12.5 %      

 

 Basophil %  2.8 %      

 

 Nucleated Red Blood Cells %  0.1      









 Comp Metabolic  10/28/2019  [facility]  Sodium  137 mmol/L  Normal  
135-145  



 Panel    [address]          



     [place] [address] [phone]          









 Potassium  4.7 mmol/L  Normal  3.5-5.0  

 

 Chloride  105 mmol/L  Normal  101-111  

 

 Co2 Carbon Dioxide  26 mmol/L  Normal  22-32  

 

 Anion Gap  6 mmol/L  Normal  2-11  

 

 Glucose  103 mg/dL  High    

 

 Blood Urea Nitrogen  21 mg/dL  Normal  6-24  

 

 Creatinine  0.78 mg/dL  Normal  0.51-0.95  

 

 BUN/Creatinine Ratio  26.9  High  8-20  

 

 Calcium  9.0 mg/dL  Normal  8.6-10.3  

 

 Total Protein  6.4 g/dL  Normal  6.4-8.9  

 

 Albumin  3.8 g/dL  Normal  3.2-5.2  

 

 Globulin  2.6 g/dL  Normal  2-4  

 

 Albumin/Globulin Ratio  1.5  Normal  1-3  

 

 Total Bilirubin  0.30 mg/dL  Normal  0.2-1.0  

 

 Alkaline Phosphatase  120 U/L  High    

 

 Alt  10 U/L  Normal  7-52  

 

 Ast  14 U/L  Normal  13-39  

 

 Egfr Non-  72.6    >60  

 

 Egfr   87.8    >60  2









 Laboratory test  10/28/2019  [facility]  Magnesium  1.9 mg/dL  
Normal  1.9-2.7  



 finding    [address]          



     [place] [address] [phone]          









 C Reactive Protein  20.13 mg/L  High  <8.01  

 

 Troponin-I (TnI)  0.00 ng/mL    <0.04  3

 

 TSH (Thyroid Stim Horm)  1.99 mcIU/mL  Normal  0.34-5.60  

 

 Pathologist Review  (SEE NOTE)      4









 CBC Auto  10/15/2019  [facility]  White Blood  6.9 10^3/uL  Normal  
3.5-10.8  5



 Diff    [address]  Count        



     [place] [address] [phone]          









 Red Blood Count  4.43 10^6/uL  Normal  3.70-4.87  

 

 Hemoglobin  9.4 g/dL  Low  12.0-16.0  

 

 Hematocrit  31 %  Low  35-47  

 

 Mean Corpuscular Volume  71 fL  Low  80-97  

 

 Mean Corpuscular Hemoglobin  21 pg  Low  27-31  

 

 Mean Corpuscular HGB Conc  30 g/dL  Low  31-36  

 

 Red Cell Distribution Width  19 %  High  10-15  

 

 Platelet Count  422 10^3/uL  Normal  150-450  

 

 Mean Platelet Volume  8.6 fL  Normal  7.4-10.4  

 

 Abs Neutrophils  3.3 10^3/uL  Normal  1.5-7.7  

 

 Abs Lymphocytes  1.8 10^3/uL  Normal  1.0-4.8  

 

 Abs Monocytes  0.7 10^3/uL  Normal  0-0.8  

 

 Abs Eosinophils  0.9 10^3/uL  High  0-0.6  

 

 Abs Basophils  0.2 10^3/uL  Normal  0-0.2  

 

 Abs Nucleated RBC  0.0 10^3/uL      

 

 Granulocyte %  47.3 %      

 

 Lymphocyte %  25.8 %      

 

 Monocyte %  10.9 %      

 

 Eosinophil %  13.0 %      

 

 Basophil %  3.0 %      

 

 Nucleated Red Blood Cells %  0.1      









 Laboratory test  10/15/2019  [facility]  C Reactive  3.47 mg/L  
Normal  <8.01  6



 finding    [address]  Protein        



     [place] [address] [phone]          









 Erythrocyte Sed Rate  19 mm/Hr  Normal  0-29  7









 Liver Function  10/15/2019  [facility]  Total Protein  6.6 g/dL  
Normal  6.4-8.9  



 Panel    [address] [place] [address] [phone]          









 Albumin  4.2 g/dL  Normal  3.2-5.2  

 

 Globulin  2.4 g/dL  Normal  2-4  

 

 Albumin/Globulin Ratio  1.8  Normal  1-3  

 

 Total Bilirubin  0.30 mg/dL  Normal  0.2-1.0  

 

 Direct Bilirubin  0.10 mg/dL  Normal  0.03-0.18  

 

 Indirect Bilirubin  0.2 mg/dL  Low  0.3-1.0  

 

 Alkaline Phosphatase  123 U/L  High    

 

 Alt  10 U/L  Normal  7-52  

 

 Ast  13 U/L  Normal  13-39  









 Basic Metabolic  10/15/2019  [facility]  Sodium  138 mmol/L  Normal
  135-145  



 Panel    [address] [place] [place] [address] [phone]          









 Potassium  4.9 mmol/L  Normal  3.5-5.0  

 

 Chloride  103 mmol/L  Normal  101-111  

 

 Co2 Carbon Dioxide  27 mmol/L  Normal  22-32  

 

 Anion Gap  8 mmol/L  Normal  2-11  

 

 Glucose  92 mg/dL  Normal    

 

 Blood Urea Nitrogen  20 mg/dL  Normal  6-24  

 

 Creatinine  0.73 mg/dL  Normal  0.51-0.95  

 

 BUN/Creatinine Ratio  27.4  High  8-20  

 

 Calcium  9.5 mg/dL  Normal  8.6-10.3  

 

 Egfr Non-  78.4    >60  

 

 Egfr   94.8    >60  8









 Cell Morphology  10/15/2019  [facility]  Microcytosis  2+      



     [address] [place] [address] [phone]          









 Hypochromasia  1+      

 

 Polychromasia  2+      

 

 Anisocytosis  2+      









 Laboratory  2019  [facility]  Lactic  3.1 mmol/L  Critical  
0.5-2.0  9



 test finding    [address] [facility]  Acid    [place]    



     [place] [address] [phone]          

 

 Comp Metabolic  2019  [facility]  Sodium  137 mmol/L  Normal  
135-145  



 Panel    [address]  [place] [address] [phone]          









 Potassium  4.3 mmol/L  Normal  3.5-5.0  

 

 Chloride  104 mmol/L  Normal  101-111  

 

 Co2 Carbon Dioxide  22 mmol/L  Normal  22-32  

 

 Anion Gap  11 mmol/L  Normal  2-11  

 

 Glucose  129 mg/dL  High    

 

 Blood Urea Nitrogen  24 mg/dL  Normal  6-24  

 

 Creatinine  0.89 mg/dL  Normal  0.51-0.95  

 

 BUN/Creatinine Ratio  27.0  High  8-20  

 

 Calcium  9.3 mg/dL  Normal  8.6-10.3  

 

 Total Protein  6.1 g/dL  Low  6.4-8.9  

 

 Albumin  3.7 g/dL  Normal  3.2-5.2  

 

 Globulin  2.4 g/dL  Normal  2-4  

 

 Albumin/Globulin Ratio  1.5  Normal  1-3  

 

 Total Bilirubin  0.30 mg/dL  Normal  0.2-1.0  

 

 Alkaline Phosphatase  99 U/L  Normal    

 

 Alt  13 U/L  Normal  7-52  

 

 Ast  15 U/L  Normal  13-39  

 

 Egfr Non-  62.3    >60  

 

 Egfr   75.4    >60  10









 Laboratory test  2019  [facility]  C Reactive  13.89  High  <
8.01  



 finding     [place]  Protein  mg/L      



     [place] [address] [phone]          

 

 Inr/Protime  2019  [facility]  Inr  1.05  Normal  0.82-1.09  
11



     [facility] [place] [address] [phone]          

 

 Arterial Blood  2019  [facility]  PH Arterial  7.39  Normal  
7.35-7.45  



 Gas    [address]  [place] [address] [phone]          









 Pco2 Arterial  37 mmHg  Normal  35-45  

 

 Po2 Arterial  58 mmHg  Critical low    12

 

 O2 Saturation Arterial  91.0 %  Low  94.0-98.0  

 

 Base Excess Arterial  -2.2 mmol/L  Low  -2.0-2.0  13

 

 Hco3 Arterial  23.0 mmol/L  Normal  19-31  









 CBC Auto  2019  [facility]  White Blood  9.1 10^3/uL  Normal  
3.5-10.8  



 Diff    [address]  Count        



     [place] [address] [phone]          









 Red Blood Count  4.07 10^6/uL  Normal  3.70-4.87  

 

 Hemoglobin  10.8 g/dL  Low  12.0-16.0  

 

 Hematocrit  34 %  Low  35-47  

 

 Mean Corpuscular Volume  82 fL  Normal  80-97  

 

 Mean Corpuscular Hemoglobin  27 pg  Normal  27-31  

 

 Mean Corpuscular HGB Conc  32 g/dL  Normal  31-36  

 

 Red Cell Distribution Width  17 %  High  10-15  

 

 Platelet Count  238 10^3/uL  Normal  150-450  

 

 Mean Platelet Volume  8.8 fL  Normal  7.4-10.4  

 

 Abs Neutrophils  7.7 10^3/uL  Normal  1.5-7.7  

 

 Abs Lymphocytes  0.6 10^3/uL  Low  1.0-4.8  

 

 Abs Monocytes  0.6 10^3/uL  Normal  0-0.8  

 

 Abs Eosinophils  0.1 10^3/uL  Normal  0-0.6  

 

 Abs Basophils  0.1 10^3/uL  Normal  0-0.2  

 

 Abs Nucleated RBC  0.0 10^3/uL      

 

 Granulocyte %  84.6 %      

 

 Lymphocyte %  6.2 %      

 

 Monocyte %  6.9 %      

 

 Eosinophil %  1.4 %      

 

 Basophil %  0.9 %      

 

 Nucleated Red Blood Cells %  0.0      









 Laboratory  2019  [facility]  Erythrocyte  11 mm/Hr  Normal  0
-29  14, 15



 test finding    [address]  Sed Rate        



     [place] [address] [phone]          

 

 Comp Metabolic  2019  [facility]  Sodium  139  Normal  135-
145  



 Panel    [address]    mmol/L      



     [place] [address] [phone]          









 Potassium  4.3 mmol/L  Normal  3.5-5.0  

 

 Chloride  103 mmol/L  Normal  101-111  

 

 Co2 Carbon Dioxide  25 mmol/L  Normal  22-32  

 

 Anion Gap  11 mmol/L  Normal  2-11  

 

 Glucose  90 mg/dL  Normal    

 

 Blood Urea Nitrogen  17 mg/dL  Normal  6-24  

 

 Creatinine  0.76 mg/dL  Normal  0.51-0.95  

 

 BUN/Creatinine Ratio  22.4  High  8-20  

 

 Calcium  9.3 mg/dL  Normal  8.6-10.3  

 

 Total Protein  6.2 g/dL  Low  6.4-8.9  

 

 Albumin  4.0 g/dL  Normal  3.2-5.2  

 

 Globulin  2.2 g/dL  Normal  2-4  

 

 Albumin/Globulin Ratio  1.8  Normal  1-3  

 

 Total Bilirubin  0.30 mg/dL  Normal  0.2-1.0  

 

 Alkaline Phosphatase  116 U/L  High    

 

 Alt  14 U/L  Normal  7-52  

 

 Ast  15 U/L  Normal  13-39  

 

 Egfr Non-  74.8    >60  

 

 Egfr   90.5    >60  16









 Laboratory test  2019  [facility]  C Reactive  6.11  Normal  <
8.01  17



 finding    [address]  [place]  Protein  mg/L      



     [place] [id number] [phone]          

 

 CBC Auto Diff  2019  [facility]  White Blood  5.9  Normal  3.5
-10.8  



     [address]  Count  10^3/uL      



     [place] [address] [phone]          









 Red Blood Count  4.32 10^6/uL  Normal  3.70-4.87  

 

 Hemoglobin  11.8 g/dL  Low  12.0-16.0  

 

 Hematocrit  36 %  Normal  35-47  

 

 Mean Corpuscular Volume  83 fL  Normal  80-97  

 

 Mean Corpuscular Hemoglobin  27 pg  Normal  27-31  

 

 Mean Corpuscular HGB Conc  33 g/dL  Normal  31-36  

 

 Red Cell Distribution Width  17 %  High  10-15  

 

 Platelet Count  304 10^3/uL  Normal  150-450  

 

 Mean Platelet Volume  9.4 fL  Normal  7.4-10.4  

 

 Abs Neutrophils  3.1 10^3/uL  Normal  1.5-7.7  

 

 Abs Lymphocytes  1.7 10^3/uL  Normal  1.0-4.8  

 

 Abs Monocytes  0.6 10^3/uL  Normal  0-0.8  

 

 Abs Eosinophils  0.4 10^3/uL  Normal  0-0.6  

 

 Abs Basophils  0.1 10^3/uL  Normal  0-0.2  

 

 Abs Nucleated RBC  0.0 10^3/uL      

 

 Granulocyte %  51.8 %      

 

 Lymphocyte %  29.4 %      

 

 Monocyte %  9.5 %      

 

 Eosinophil %  6.8 %      

 

 Basophil %  2.5 %      

 

 Nucleated Red Blood Cells %  0.0      









 Laboratory test  2019  [facility]  Miscellaneous Test  See 
comments      18



 finding    [address]          



     [place] [address] [phone]          

 

 Hepatitis C  2019  [facility]  HCV Index  0.03 s/c      



 Antibody    [address]          



     [place] [address] [phone]          









 Hepatitis C Antibody  Negative    Negative  









 Laboratory test  2019  [facility]  Lipase  41 U/L  Normal  
11.0-82.0  



 finding    [address]          



     [place] [address] [phone]          









 Troponin-I (TnI)  0.00 ng/mL    <0.04  19









 Comp Metabolic  2019  [facility]  Sodium  138 mmol/L  Normal  
135-145  



 Panel    [address]          



     [place] [address] [phone]          









 Potassium  4.3 mmol/L  Normal  3.5-5.0  

 

 Chloride  104 mmol/L  Normal  101-111  

 

 Co2 Carbon Dioxide  28 mmol/L  Normal  22-32  

 

 Anion Gap  6 mmol/L  Normal  2-11  

 

 Glucose  102 mg/dL  High    

 

 Blood Urea Nitrogen  20 mg/dL  Normal  6-24  

 

 Creatinine  0.78 mg/dL  Normal  0.51-0.95  

 

 BUN/Creatinine Ratio  25.6  High  8-20  

 

 Calcium  9.0 mg/dL  Normal  8.6-10.3  

 

 Total Protein  5.9 g/dL  Low  6.4-8.9  

 

 Albumin  3.8 g/dL  Normal  3.2-5.2  

 

 Globulin  2.1 g/dL  Normal  2-4  

 

 Albumin/Globulin Ratio  1.8  Normal  1-3  

 

 Total Bilirubin  0.30 mg/dL  Normal  0.2-1.0  

 

 Alkaline Phosphatase  74 U/L  Normal    

 

 Alt  18 U/L  Normal  7-52  

 

 Ast  16 U/L  Normal  13-39  

 

 Egfr Non-  72.6    >60  

 

 Egfr   87.8    >60  20









 Laboratory test  2019  [facility]  Partial  49.0  High  26.0-
38.0  



 finding    [address]  Thrombo Time  seconds      



     [place] [address]  PTT        



     [phone]          









 Lactic Acid  1.1 mmol/L  Normal  0.5-2.0  21









 Inr/Protime  2019  [facility]  Inr  1.11  High  0.82-1.09  22



     [address]          



     [place] [address] [phone]          

 

 CBC Auto Diff  2019  [facility]  White Blood  6.8  Normal  3.5
-10.8  



     [address]  Count  10^3/uL      



     [place] [address] [phone]          









 Red Blood Count  4.02 10^6/uL  Normal  3.70-4.87  

 

 Hemoglobin  11.9 g/dL  Low  12.0-16.0  

 

 Hematocrit  36 %  Normal  35-47  

 

 Mean Corpuscular Volume  89 fL  Normal  80-97  

 

 Mean Corpuscular Hemoglobin  30 pg  Normal  27-31  

 

 Mean Corpuscular HGB Conc  34 g/dL  Normal  31-36  

 

 Red Cell Distribution Width  15 %  Normal  10.5-15  

 

 Platelet Count  255 10^3/uL  Normal  150-450  

 

 Mean Platelet Volume  8.9 fL  Normal  7.4-10.4  

 

 Abs Neutrophils  4.1 10^3/uL  Normal  1.5-7.7  

 

 Abs Lymphocytes  1.6 10^3/uL  Normal  1.0-4.8  

 

 Abs Monocytes  0.8 10^3/uL  Normal  0-0.8  

 

 Abs Eosinophils  0.3 10^3/uL  Normal  0-0.6  

 

 Abs Basophils  0.1 10^3/uL  Normal  0-0.2  

 

 Abs Nucleated RBC  0.0 10^3/uL      

 

 Granulocyte %  60.1 %      

 

 Lymphocyte %  22.7 %      

 

 Monocyte %  11.3 %      

 

 Eosinophil %  4.8 %      

 

 Basophil %  1.1 %      

 

 Nucleated Red Blood Cells %  0.1      









 Laboratory test  2019  [facility]  Erythrocyte Sed  10 mm/Hr  
Normal  0-29  



 finding    [address]  Rate        



     [place] [address] [phone]          









 C Reactive Protein  1.91 mg/L  Normal  <8.01  









 CBC Auto  2019  [facility]  White Blood  8.2 10^3/uL  Normal  
3.5-10.8  



 Diff    [address]  Count        



     [place] [address] [phone]          









 Red Blood Count  4.08 10^6/uL  Normal  3.70-4.87  

 

 Hemoglobin  12.1 g/dL  Normal  12.0-16.0  

 

 Hematocrit  37 %  Normal  35-47  

 

 Mean Corpuscular Volume  90 fL  Normal  80-97  

 

 Mean Corpuscular Hemoglobin  30 pg  Normal  27-31  

 

 Mean Corpuscular HGB Conc  33 g/dL  Normal  31-36  

 

 Red Cell Distribution Width  15 %  Normal  10.5-15  

 

 Platelet Count  264 10^3/uL  Normal  150-450  

 

 Mean Platelet Volume  9.4 fL  Normal  7.4-10.4  

 

 Abs Neutrophils  4.9 10^3/uL  Normal  1.5-7.7  

 

 Abs Lymphocytes  1.9 10^3/uL  Normal  1.0-4.8  

 

 Abs Monocytes  0.8 10^3/uL  Normal  0-0.8  

 

 Abs Eosinophils  0.4 10^3/uL  Normal  0-0.6  

 

 Abs Basophils  0.2 10^3/uL  Normal  0-0.2  

 

 Abs Nucleated RBC  0.0 10^3/uL      

 

 Granulocyte %  60.1 %      

 

 Lymphocyte %  23.0 %      

 

 Monocyte %  10.2 %      

 

 Eosinophil %  4.6 %      

 

 Basophil %  2.1 %      

 

 Nucleated Red Blood Cells %  0.0      









 Comp Metabolic  2019  [facility]  Sodium  135 mmol/L  Normal  
135-145  



 Panel    [address]          



     [place] [address] [phone]          









 Potassium  4.5 mmol/L  Normal  3.5-5.0  

 

 Chloride  101 mmol/L  Normal  101-111  

 

 Co2 Carbon Dioxide  25 mmol/L  Normal  22-32  

 

 Anion Gap  9 mmol/L  Normal  2-11  

 

 Glucose  95 mg/dL  Normal    

 

 Blood Urea Nitrogen  27 mg/dL  High  6-24  

 

 Creatinine  0.82 mg/dL  Normal  0.51-0.95  

 

 BUN/Creatinine Ratio  32.9  High  8-20  

 

 Calcium  9.4 mg/dL  Normal  8.6-10.3  

 

 Total Protein  6.2 g/dL  Low  6.4-8.9  

 

 Albumin  4.1 g/dL  Normal  3.2-5.2  

 

 Globulin  2.1 g/dL  Normal  2-4  

 

 Albumin/Globulin Ratio  2.0  Normal  1-3  

 

 Total Bilirubin  0.40 mg/dL  Normal  0.2-1.0  

 

 Alkaline Phosphatase  84 U/L  Normal    

 

 Alt  20 U/L  Normal  7-52  

 

 Ast  19 U/L  Normal  13-39  

 

 Egfr Non-  68.5    >60  

 

 Egfr   82.9    >60  23









 Laboratory test  2019  [facility]  TSH (Thyroid  6.50  High  
0.34-5.60  



 finding    [address]  Stim Horm)  mcIU/mL      



     [place] [address] [phone]          









 Free T4 (Free Thyroxine)  0.96 ng/dL  Normal  0.61-1.12  









 1  [facility] Severe Sepsis and Septic Shock Management Bundle Measure



   requires all lactic acids initially measuring >2.0 mmol/L be



   repeated.

 

 2  *******Because ethnic data is not always readily available,



   this report includes an eGFR for both -Americans and



   non- Americans.****



   The National Kidney Disease Education Program (NKDEP) does



   not endorse the use of the MDRD equation for patients that



   are not between the ages of 18 and 70, are pregnant, have



   extremes of body size, muscle mass, or nutritional status,



   or are non- or non-.



   According to the National Kidney Foundation, irrespective of



   diagnosis, the stage of the disease is based on the level of



   kidney function:



   Stage Description                      GFR(mL/min/1.73 m(2))



   1     Kidney damage with normal or decreased GFR       90



   2     Kidney damage with mild decrease in GFR          60-89



   3     Moderate decrease in GFR                         30-59



   4     Severe decrease in GFR                           15-29



   5     Kidney failure                       <15 (or dialysis)

 

 3  Troponin-I testing on Plasma Separator Tubes (PST) has a



   known false positive rate of 0.20-0.40%.  All positive



   troponins reflex immediately to secondary confirmatory



   testing.



   



   Using the [employer] DxI 800 Access Immunoassay systems, the



   99th percentile upper reference limit was demonstrated to be



   < 0.03 ng/mL.

 

 4  Microcytic anemia.



   



   Reviewed by Kimberlee Garcia MD

 

 5  [id number]

 

 6  [id number]

 

 7  [id number]

 

 8  *******Because ethnic data is not always readily available,



   this report includes an eGFR for both -Americans and



   non- Americans.****



   The National Kidney Disease Education Program (NKDEP) does



   not endorse the use of the MDRD equation for patients that



   are not between the ages of 18 and 70, are pregnant, have



   extremes of body size, muscle mass, or nutritional status,



   or are non- or non-.



   According to the National Kidney Foundation, irrespective of



   diagnosis, the stage of the disease is based on the level of



   kidney function:



   Stage Description                      GFR(mL/min/1.73 m(2))



   1     Kidney damage with normal or decreased GFR       90



   2     Kidney damage with mild decrease in GFR          60-89



   3     Moderate decrease in GFR                         30-59



   4     Severe decrease in GFR                           15-29



   5     Kidney failure                       <15 (or dialysis)

 

 9  Critical Result LACT:3.1 Called to [id number] at: 00:55:15 by:[id number] Read back



   by:[email or web address]



   [facility] Severe Sepsis and Septic Shock Management Bundle Measure



   requires all lactic acids initially measuring >2.0 mmol/L be



   repeated.

 

 10  *******Because ethnic data is not always readily available,



   this report includes an eGFR for both -Americans and



   non- Americans.****



   The National Kidney Disease Education Program (NKDEP) does



   not endorse the use of the MDRD equation for patients that



   are not between the ages of 18 and 70, are pregnant, have



   extremes of body size, muscle mass, or nutritional status,



   or are non- or non-.



   According to the National Kidney Foundation, irrespective of



   diagnosis, the stage of the disease is based on the level of



   kidney function:



   Stage Description                      GFR(mL/min/1.73 m(2))



   1     Kidney damage with normal or decreased GFR       90



   2     Kidney damage with mild decrease in GFR          60-89



   3     Moderate decrease in GFR                         30-59



   4     Severe decrease in GFR                           15-29



   5     Kidney failure                       <15 (or dialysis)

 

 11  Standard intensity warfarin therapeutic range: 2.0-3.0



   High intensity warfarin therapeutic range: 2.5-3.5

 

 12  Verbal to [id number] by [id number] at 2353 on 19.



   Results read back accurately.

 

 13  Reference ranges based on room air.

 

 14  [id number]

 

 15  [id number]

 

 16  *******Because ethnic data is not always readily available,



   this report includes an eGFR for both -Americans and



   non- Americans.****



   The National Kidney Disease Education Program (NKDEP) does



   not endorse the use of the MDRD equation for patients that



   are not between the ages of 18 and 70, are pregnant, have



   extremes of body size, muscle mass, or nutritional status,



   or are non- or non-.



   According to the National Kidney Foundation, irrespective of



   diagnosis, the stage of the disease is based on the level of



   kidney function:



   Stage Description                      GFR(mL/min/1.73 m(2))



   1     Kidney damage with normal or decreased GFR       90



   2     Kidney damage with mild decrease in GFR          60-89



   3     Moderate decrease in GFR                         30-59



   4     Severe decrease in GFR                           15-29



   5     Kidney failure                       <15 (or dialysis)

 

 17  [id number]

 

 18  Procedure                    Result    Units  Ref Interval



   ------------------------------------------------------------



   Drug Screen 9 Panel, Serum or Plasma - Immunoassay Screen



   with Reflex to Mass Spectrometry Confirmation/Quantitation



   



   Amphetamines, S/P, screen    Negative   ng/mL  Cutoff 30



   



   Methamphetamine, S/P, screen Negative   ng/mL  Cutoff 30



   



   Barbiturates, S/P, screen    Negative   ng/mL  Cutoff 75



   



   Benzodiazepines, S/P, screen Negative   ng/mL  Cutoff 75



   



   Buprenorphine, S/P, screen   Negative   ng/mL  Cutoff 1



   



   Cannabinoids, S/P, screen    Negative   ng/mL  Cutoff 30



   



   Cocaine, S/P, Screen         Negative   ng/mL  Cutoff 30



   



   Methadone, S/P, screen       Negative   ng/mL  Cutoff 40



   



   Opiates, S/P, screen       **Positive** ng/mL  Cutoff 30



   If the screen is positive, then confirmation by mass



   spectrometry will be added. Additional charges will apply.



   Unconfirmed positive may be useful for medical purposes, but



   does not meet forensic standards.



   



   Oxycodone, S/P, screen       Negative   ng/mL  Cutoff 30



   



   Phencyclidine, S/P, Screen   Negative   ng/mL  Cutoff 15



   



   Drug Screen Comments, Serum or Plasma See Note



   



   Drug Screen Comments, Serum or Plasma:



   INTERPRETIVE INFORMATION: Drug Screen 9 Panel, Serum or



   Plasma - Immunoassay Screen with



   Reflex to Mass Spectrometry



   Confirmation/Quantitation



   1. Methodology: Qualitative Immunoassay Screen



   



   2. Drugs/Drug classes reported as "Positive" are



   automatically reflexed to mass spectrometry



   confirmation/quantitation testing. An immuncassay



   unconfirmed positive screen result may be useful for medical



   purposes but does not meet forensic standards.



   



   3. The absence of expected drug(s) and/or drug metabolite(s)



   may indicate non-compliance, inappropriate timing of



   specimen collection relative to drug administration, poor



   drug absorption, or limitations of testing. The



   concentration at which the screening test can detect a drug



   or metabolite varies within a drug class. Specimens for



   which drugs or drug classes are detected by the screen are



   automatically reflexed to a second, more specific technology



   (mass spectrometry). The concentration value must be greater



   than or equal to the cutoff to be reported as positive.



   Interpretive questions should be directed to the



   laboratory.



   



   4. For medical purposes only; not valid for forensic use.



   Test developed and characteristics determined by [facility]. See Compliance



   Statement B: [email or web address]



   



   Opiates, Serum or Plasma, Quantitative



   6-acetylmorphine, S/P, Quant <2 ng/mL



   INTERPRETIVE INFORMATION: Opiates, Serum or Plasma



   Quantitative



   



   Positive cutoff:  2 ng/mL



   



   For medical purposes only:  not valid for forensic use.



   Identification of specific drug(s) taken by specimen donor



   is problematic due to common metabolites, some of which are



   prescription drugs themselves.  The absence of expected



   drug(s) and /or drug metabolite(s) may indicate



   non-compliance, inappropriate timing of specimen collection



   relative to drug administration, poor drug absorption, or



   limitations of testing.  All drugs covered are the



   non-glucuronidated (free) form.  The concentration value



   must be greater than or equal to the cutoff to be reported



   as positive.  A very small amount of an unexpected drug



   analyte in the presence of a large amount of an expected



   drug analyte may reflect pharmaceutical impurity.



   Interpretive questions should be directed to the laboratory.



   Test developed and characteristics determined by [facility].  See Compliance Statement B: [email or web address]/



   



   Codeine, S/P, Quant  22 ng/mL



   Codeine is not a recognized metabolite of other opiates and



   its presence usually indicates use of a codeine-containing



   drug.  Codeine is metabolized to morphine and hydocodone.



   Morphine, S/P, Quant  <2 ng/mL



   Hydrocodone, S/P, Quant  <2 ng/mL



   Hydromorphone, S/P, Quant <2 ng/mL



   Oxycodone, S/P, Quant  <2 ng/mL



   Oxymorphone, S/P, Quant         <2 ng/mL



   



   



   Test Performed by:



   [facility]



   [address] [place] [id number]



   [phone]



   [email or web address]



   Kole Estrada MD, MS, Director of Laboratories

 

 19  Troponin-I testing on Plasma Separator Tubes (PST) has a



   known false positive rate of 0.20-0.40%.  All positive



   troponins reflex immediately to secondary confirmatory



   testing.



   



   Using the [employer] DxI 800 Access Immunoassay systems, the



   99th percentile upper reference limit was demonstrated to be



   < 0.03 ng/mL.

 

 20  *******Because ethnic data is not always readily available,



   this report includes an eGFR for both -Americans and



   non- Americans.****



   The National Kidney Disease Education Program (NKDEP) does



   not endorse the use of the MDRD equation for patients that



   are not between the ages of 18 and 70, are pregnant, have



   extremes of body size, muscle mass, or nutritional status,



   or are non- or non-.



   According to the National Kidney Foundation, irrespective of



   diagnosis, the stage of the disease is based on the level of



   kidney function:



   Stage Description                      GFR(mL/min/1.73 m(2))



   1     Kidney damage with normal or decreased GFR       90



   2     Kidney damage with mild decrease in GFR          60-89



   3     Moderate decrease in GFR                         30-59



   4     Severe decrease in GFR                           15-29



   5     Kidney failure                       <15 (or dialysis)

 

 21  [facility] Severe Sepsis and Septic Shock Management Bundle Measure



   requires all lactic acids initially measuring >2.0 mmol/L be



   repeated.

 

 22  Standard intensity warfarin therapeutic range: 2.0-3.0



   High intensity warfarin therapeutic range: 2.5-3.5

 

 23  *******Because ethnic data is not always readily available,



   this report includes an eGFR for both -Americans and



   non- Americans.****



   The National Kidney Disease Education Program (NKDEP) does



   not endorse the use of the MDRD equation for patients that



   are not between the ages of 18 and 70, are pregnant, have



   extremes of body size, muscle mass, or nutritional status,



   or are non- or non-.



   According to the National Kidney Foundation, irrespective of



   diagnosis, the stage of the disease is based on the level of



   kidney function:



   Stage Description                      GFR(mL/min/1.73 m(2))



   1     Kidney damage with normal or decreased GFR       90



   2     Kidney damage with mild decrease in GFR          60-89



   3     Moderate decrease in GFR                         30-59



   4     Severe decrease in GFR                           15-29



   5     Kidney failure                       <15 (or dialysis)







Procedures







 Date  Code  Description  Status

 

 201910  Inject/Drain Joint/Bursa Major W/O US  Completed

 

 201910  Inject/Drain Joint/Bursa Major W/O US  Completed

 

 2019  99014  Stress Test  Completed

 

 2019  56892  Myocardial Perfusion Imaging Tomographic (Spect)  Completed



     Multiple Studies  

 

 09/10/2019  41692  EKG Tracing & Interpretation  Completed

 

 2019  62924  EKG, Interpretation Only  Completed

 

 2019  [id number]  Bone Mineral Density Test  Completed

 

 2019  50291  ECHO Transthoracic, Real-Time 2D With Doppler And  Completed



     Color Flow  

 

 2019  20950  ECHO Transthoracic, Real-Time 2D With Doppler And  Completed



     Color Flow  







[employer]







 Description

 

 No Information Available







Encounters







 Type  Date  Location  Provider  Dx  Diagnosis

 

 Office Visit  10/24/2019  Rheumatology  Lidia Son,  M06.09  Rheumatoid



   10:00a  [facility] -  FNP    arthritis w/o



     Ccmob      rheumatoid



           factor, multiple



           sites









 I11.9  Hypertensive heart disease without heart failure

 

 J44.9  Chronic obstructive pulmonary disease, unspecified

 

 D64.9  Anemia, unspecified

 

 J18.9  Pneumonia, unspecified organism

 

 Z79.899  Other long term (current) drug therapy

 

 Z23  Encounter for immunization









 Office Visit  10/18/2019 11:40a  [facility]  Hair SStephane  I11.9  
Hypertensive heart



     Of Marily Joe, DO    disease without



       FACC    heart failure









 M06.9  Rheumatoid arthritis, unspecified

 

 I10  Essential (primary) hypertension

 

 D64.9  Anemia, unspecified

 

 R91.8  Other nonspecific abnormal finding of lung field

 

 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









 Office Visit  10/11/2019 10:40a  [facility]  Lucy Felix,  I11.9  
Hypertensive heart



     Medicine -  MD    disease without



     Ccmob      heart failure









 J18.9  Pneumonia, unspecified organism

 

 M06.9  Rheumatoid arthritis, unspecified

 

 M19.012  Primary osteoarthritis, left shoulder

 

 M17.12  Unilateral primary osteoarthritis, left knee









 Office Visit  09/10/2019 10:40a  [facility]  Hair SStephane  R07.9  Chest pain
,



     [facility]  Heath, DO    unspecified



       FACC    









 I11.9  Hypertensive heart disease without heart failure

 

 R05  Cough

 

 D64.9  Anemia, unspecified

 

 I25.2  Old myocardial infarction

 

 J44.9  Chronic obstructive pulmonary disease, unspecified









 Office Visit  2019 10:30a  [facility]  Fernanda  J18.1  Lobar pneumonia,



     Medicine - Tate Echols M.D.    unspecified



           organism









 J96.01  Acute respiratory failure with hypoxia

 

 R01.1  Cardiac murmur, unspecified

 

 Z09  Encntr for f/u exam aft trtmt for cond oth than malig neoplm









 Office Visit  2019  9:36a  [facility]  Elisabeth Aguirre,  J18.1  Lobar 
pneumonia,



     Assoc,yas JAVED    unspecified



     Hospitalists      organism









 J96.01  Acute respiratory failure with hypoxia

 

 M06.9  Rheumatoid arthritis, unspecified

 

 I10  Essential (primary) hypertension

 

 Z93.3  Colostomy status

 

 R01.1  Cardiac murmur, unspecified









 Office Visit  2019  9:36a  [facility]  Elisabeth Aguirre,  J96.01  Acute 
respiratory



     Assoc,yas JAVED    failure with



     Hospitalists      hypoxia









 J18.1  Lobar pneumonia, unspecified organism

 

 I10  Essential (primary) hypertension

 

 Z93.3  Colostomy status









 Office Visit  2019  9:35a  [facility]  Elisabeth Dill,  J18.1  Lobar 
pneumonia,



     Assoc,pc  MD    unspecified



     Hospitalists      organism









 J96.01  Acute respiratory failure with hypoxia

 

 M06.9  Rheumatoid arthritis, unspecified

 

 I10  Essential (primary) hypertension









 Office Visit  2019  9:35a  [facility]  Elisabeth Dill,  J18.1  Lobar 
pneumonia,



     Assoc,pc  MD    unspecified



     Hospitalists      organism









 J96.01  Acute respiratory failure with hypoxia

 

 M06.9  Rheumatoid arthritis, unspecified









 Office Visit  2019  9:34a  [facility]  Elisabeth Dill,  J18.9  Pneumonia,



     Assoc,pc  MD    unspecified



     Hospitalists      organism









 M06.9  Rheumatoid arthritis, unspecified

 

 I10  Essential (primary) hypertension









 Office Visit  2019  9:34a  [facility]  Leonor  J96.01  Acute 
respiratory



     Assoc,pc  VANE Bautista    failure with



     Hospitalists      hypoxia









 J18.9  Pneumonia, unspecified organism

 

 M06.9  Rheumatoid arthritis, unspecified









 Office Visit  2019 10:00a  [facility]  Lucy Felix MD  Z00.01  
Encounter for



     Medicine [facility]      general adult



           medical exam w



           abnormal



           findings









 Z12.11  Encounter for screening for malignant neoplasm of colon

 

 Z12.31  Encntr screen mammogram for malignant neoplasm of breast

 

 Z23  Encounter for immunization

 

 Z13.220  Encounter for screening for lipoid disorders

 

 Z13.1  Encounter for screening for diabetes mellitus

 

 E03.9  Hypothyroidism, unspecified

 

 I10  Essential (primary) hypertension

 

 M05.79  Rheu arthritis w rheu factor mult site w/o org/sys involv

 

 M81.0  Age-related osteoporosis w/o current pathological fracture

 

 G89.4  Chronic pain syndrome

 

 Z11.59  Encounter for screening for other viral diseases









 Office Visit  2019  9:40a  [facility]  Lucy Felix MD  M05.79  Rheu 
arthritis w



     Medicine - [facility]      rheu factor mult



           site w/o org/sys



           involv









 E03.9  Hypothyroidism, unspecified

 

 I10  Essential (primary) hypertension

 

 R01.1  Cardiac murmur, unspecified







Assessments







 Date  Code  Description  Provider

 

 2019  J44.9  Chronic obstructive pulmonary disease,  Charlotte Watson MD



     unspecified  

 

 2019  J98.4  Other disorders of lung  Charlotte Watson MD

 

 2019  R06.83  Snoring  Charlotte Watson MD

 

 2019  R05  Cough  Komal Boykin MD

 

 2019  R51  Headache  Komal Boykin MD

 

 2019  I10  Essential (primary) hypertension  Komal Boykin MD

 

 2019  E03.9  Hypothyroidism, unspecified  Komal Boykin MD

 

 2019  M06.09  Rheumatoid arthritis without rheumatoid  Tye Mari MD



     factor, multiple sites  

 

 2019  M17.12  Unilateral primary osteoarthritis, left  Tye Mari MD



     knee  

 

 2019  M19.012  Primary osteoarthritis, left shoulder  Tye Mari MD

 

 2019  M19.011  Primary osteoarthritis, right shoulder  yTe Mari MD

 

 10/24/2019  M06.09  Rheumatoid arthritis without rheumatoid  Jigarofia Huy, FN



     factor, multiple sites  

 

 10/24/2019  I11.9  Hypertensive heart disease without heart  Jigaroficlaudia Son, FNP



     failure  

 

 10/24/2019  J44.9  Chronic obstructive pulmonary disease,  Zsofia Huy, FNP



     unspecified  

 

 10/24/2019  D64.9  Anemia, unspecified  Zsofia Huy, P

 

 10/24/2019  J18.9  Pneumonia, unspecified organism  Zsofia Huy, [facility]

 

 10/24/2019  Z79.899  Other long term (current) drug therapy  Zsofia Huy, [facility]

 

 10/24/2019  Z23  Encounter for immunization  Zsofia Huy, FNP

 

 10/18/2019  I11.9  Hypertensive heart disease without heart  Hair Joe, 
DO FACC



     failure  

 

 10/18/2019  M06.9  Rheumatoid arthritis, unspecified  Hair Joe, DO FACC

 

 10/18/2019  I10  Essential (primary) hypertension  Hair Joe, DO FACC

 

 10/18/2019  D64.9  Anemia, unspecified  Hair Joe, DO FACC

 

 10/18/2019  R91.8  Other nonspecific abnormal finding of  Hair Joe, DO 
FAC



     lung field  

 

 10/18/2019  F17.201  Nicotine dependence, unspecified, in  Hair Joe, DO 
FACC



     remission  

 

 10/11/2019  I11.9  Hypertensive heart disease without heart  Lucy Felix MD



     failure  

 

 10/11/2019  J18.9  Pneumonia, unspecified organism  Lucy Felix MD

 

 10/11/2019  M06.9  Rheumatoid arthritis, unspecified  Lucy Felix MD

 

 10/11/2019  M19.012  Primary osteoarthritis, left shoulder  Lucy Felix MD

 

 10/11/2019  M17.12  Unilateral primary osteoarthritis, left  Lucy Felix MD



     knee  

 

 2019  R07.9  Chest pain, unspecified  Hair Joe, DO FACC

 

 09/10/2019  R07.9  Chest pain, unspecified  Hair SStephane Joe, DO FACC

 

 09/10/2019  I11.9  Hypertensive heart disease without heart  Hair Joe, 
DO FACC



     failure  

 

 09/10/2019  R05  Cough  Hair Joe, DO FACC

 

 09/10/2019  D64.9  Anemia, unspecified  Hair Joe, DO FACC

 

 09/10/2019  I25.2  Old myocardial infarction  Hair Joe, DO FACC

 

 09/10/2019  J44.9  Chronic obstructive pulmonary disease,  Hair Joe, DO 
FACC



     unspecified  

 

 2019  J18.1  Lobar pneumonia, unspecified organism  Fernanda Echols M.D.

 

 2019  J96.01  Acute respiratory failure with hypoxia  Fernanda Echols M.D.

 

 2019  R01.1  Cardiac murmur, unspecified  Fernanda Echols M.D.

 

 2019  Z09  Encounter for follow-up examination after  Fernanda Echols M.D.



     completed treatment for conditions other  



     than malignant neoplasm  

 

 2019  J18.1  Lobar pneumonia, unspecified organism  Elisabeth Aguirre MD

 

 2019  J96.01  Acute respiratory failure with hypoxia  Elisabeth Aguirre MD

 

 2019  M06.9  Rheumatoid arthritis, unspecified  Elisabeth Aguirre MD

 

 2019  I10  Essential (primary) hypertension  Elisabeth Aguirre MD

 

 2019  Z93.3  Colostomy status  Elisabeth Aguirre MD

 

 2019  R01.1  Cardiac murmur, unspecified  Elisabeth Aguirre MD

 

 2019  R94.31  Abnormal electrocardiogram [ECG] [EKG]  Hair Joe, 
DO FACC

 

 2019  J96.01  Acute respiratory failure with hypoxia  Elisabeth Aguirre MD

 

 2019  J18.1  Lobar pneumonia, unspecified organism  Elisabeth Aguirre MD

 

 2019  I10  Essential (primary) hypertension  Elisabeth Aguirre MD

 

 2019  Z93.3  Colostomy status  Elisabeth Aguirre MD

 

 2019  J18.1  Lobar pneumonia, unspecified organism  Elisabeth Aguirre MD

 

 2019  J96.01  Acute respiratory failure with hypoxia  Elisabeth Aguirre MD

 

 2019  M06.9  Rheumatoid arthritis, unspecified  Elisabeth Aguirre MD

 

 2019  I10  Essential (primary) hypertension  Elisabeth Aguirre MD

 

 2019  J18.1  Lobar pneumonia, unspecified organism  Elisabeth Aguirre MD

 

 2019  J96.01  Acute respiratory failure with hypoxia  Elisabeth Aguirre MD

 

 2019  M06.9  Rheumatoid arthritis, unspecified  Elisabeth Aguirre MD

 

 2019  J18.9  Pneumonia, unspecified organism  Elisabeth Aguirre MD

 

 2019  M06.9  Rheumatoid arthritis, unspecified  Elisabeth Aguirre MD

 

 2019  I10  Essential (primary) hypertension  Elisabeth Aguirre MD

 

 2019  J96.01  Acute respiratory failure with hypoxia  Leonor Bautista M.D.

 

 2019  J18.9  Pneumonia, unspecified organism  Leonor Bautista M.D.

 

 2019  M06.9  Rheumatoid arthritis, unspecified  Leonor Bautista M.D.

 

 2019  R01.1  Cardiac murmur, unspecified  Hair Joe, DO [facility]

 

 2019  R01.1  Cardiac murmur, unspecified  [facility]

 

 2019  Z00.01  Encounter for general adult medical  Lucy Felix MD



     examination with abnorma  

 

 2019  Z12.11  Encounter for screening for malignant  Lucy Felix MD



     neoplasm of colon  

 

 2019  Z12.31  Encounter for screening mammogram for  Lucy Felix MD



     malignant neoplasm of  

 

 2019  Z23  Encounter for immunization  Lucy Felix MD

 

 2019  Z13.220  Encounter for screening for lipoid  Lucy Felix MD



     disorders  

 

 2019  Z13.1  Encounter for screening for diabetes  Lucy Felix MD



     mellitus  

 

 2019  E03.9  Hypothyroidism, unspecified  Lucy Felix MD

 

 2019  I10  Essential (primary) hypertension  Lucy Felix MD

 

 2019  M05.79  Rheumatoid arthritis with rheumatoid  Lucy Felix MD



     factor of multiple site  

 

 2019  M81.0  Age-related osteoporosis without current  Lucy Felix MD



     pathological fractu  

 

 2019  G89.4  Chronic pain syndrome  Lucy Felix MD

 

 2019  Z11.59  Encounter for screening for other viral  Lucy Felix MD



     diseases  

 

 2019  M05.79  Rheumatoid arthritis with rheumatoid  Lucy Felix MD



     factor of multiple site  

 

 2019  E03.9  Hypothyroidism, yenifer Felix MD

 

 2019  I10  Essential (primary) hypertension  Lucy Felix MD

 

 2019  R01.1  Cardiac murmur, veroified  Lucy Felix MD







Plan of Treatment

Future Appointment(s):2020 10:30 am - Evelyn Guy NP at 
[facility]2020  2:00 pm - Yaw Rizzo M.D. at [facility]2020  8:45 am - Tye Mari MD 
at [id number] 10:30 am - FENG Poole at 
[facility]2020 11:00 am - Lucy Felix MD at 
[facility]2019 - Charlotte Watson MDJ44.9 Chronic 
obstructive pulmonary disease, unspecifiedNew Labs:Alpha 1 Antitrypsin A1a, 
Ordered: 19New Orders:PFTW/Spirometry Vol Pre/Post Bronchdilat Dlco 
Complete, Ordered:  Minute Walk, Ordered: 19Follow up:3 weeks 
SMJ98.4 Other disorders of lungNew Labs:C Reactive Protein, Ordered: Rheumatoid Factor, Ordered: 19CBC Auto Diff, Ordered: 19New Xrays:
CT Chest W/O, Ordered: 19R06.83 SnoringNew Orders:Home Sleep Testing, 
Scheduled: 19



Functional Status







 Description

 

 No Information Available







Mental Status







 Description

 

 No Information Available







Referrals







 Refer to Dr  Reason for Referral  Status  Appt Date

 

 Yaw Rizzo MD    Sent  2019









 [address]

 

 [address]

 

 [place] [address] [phone]

 

 









 Charlotte Watson MD  please evaluate pt with recurrent pneumonia,  Sent  2019



   single episode of hemoptysis (now resolved) and    



   slowly declining H/H. She is a past smoker. Pt    



   not yet scheduled    









 [address]

 

 [address]

 

 [place] [address] [phone]

 

 









 Charlotte Watson MD  pt with recurrent lobar pneumonia, COPD, past  Sent  



   smoker    









 [address]

 

 [address]

 

 [place] [address] [phone]

 

 









 Hair Joe DO, FACC    Sent  09/10/2019









 [address] [place] [id number] [phone]

## 2020-01-22 LAB
ANION GAP SERPL CALC-SCNC: 9 MMOL/L (ref 2–11)
BASOPHILS # BLD AUTO: 0.1 10^3/UL (ref 0–0.2)
BUN SERPL-MCNC: 19 MG/DL (ref 6–24)
BUN/CREAT SERPL: 22.9 (ref 8–20)
CALCIUM SERPL-MCNC: 7.5 MG/DL (ref 8.6–10.3)
CHLORIDE SERPL-SCNC: 109 MMOL/L (ref 101–111)
DACRYOCYTES BLD QL SMEAR: (no result)
EOSINOPHIL # BLD AUTO: 0.1 10^3/UL (ref 0–0.6)
FERRITIN SERPL IA-MCNC: 22.7 NG/ML (ref 11–307)
FLUAV RNA SPEC QL NAA+PROBE: NEGATIVE
FLUBV RNA SPEC QL NAA+PROBE: NEGATIVE
FOLATE SERPL-MCNC: > 20 NG/ML (ref 3.99–?)
GLUCOSE SERPL-MCNC: 115 MG/DL (ref 70–100)
HCO3 SERPL-SCNC: 21 MMOL/L (ref 22–32)
HCT VFR BLD AUTO: 24 % (ref 35–47)
HGB BLD-MCNC: 6.8 G/DL (ref 12–16)
IRON SERPL-MCNC: < 20 UG/DL (ref 50–212)
LYMPHOCYTES # BLD AUTO: 0.7 10^3/UL (ref 1–4.8)
MCH RBC QN AUTO: 19 PG (ref 27–31)
MCHC RBC AUTO-ENTMCNC: 28 G/DL (ref 31–36)
MCV RBC AUTO: 67 FL (ref 80–97)
MICROCYTES BLD QL SMEAR: (no result)
MONOCYTES # BLD AUTO: 0.5 10^3/UL (ref 0–0.8)
NEUTROPHILS # BLD AUTO: 16.8 10^3/UL (ref 1.5–7.7)
NRBC # BLD AUTO: 0 10^3/UL
NRBC BLD QL AUTO: 0
PLATELET # BLD AUTO: 353 10^3/UL (ref 150–450)
POLYCHROMASIA BLD QL SMEAR: (no result)
POTASSIUM SERPL-SCNC: 4.4 MMOL/L (ref 3.5–5)
RBC # BLD AUTO: 3.64 10^6 /UL (ref 3.7–4.87)
SODIUM SERPL-SCNC: 139 MMOL/L (ref 135–145)
TIBC SERPL-MCNC: 435 MCG/DL (ref 250–450)
TRANSFERRIN SERPL-MCNC: 311 MG/DL (ref 203–362)
TROPONIN I SERPL-MCNC: 0.04 NG/ML (ref ?–0.03)
WBC # BLD AUTO: 18.2 10^3/UL (ref 3.5–10.8)

## 2020-01-22 PROCEDURE — 3E033XZ INTRODUCTION OF VASOPRESSOR INTO PERIPHERAL VEIN, PERCUTANEOUS APPROACH: ICD-10-PCS | Performed by: INTERNAL MEDICINE

## 2020-01-22 PROCEDURE — 30233N1 TRANSFUSION OF NONAUTOLOGOUS RED BLOOD CELLS INTO PERIPHERAL VEIN, PERCUTANEOUS APPROACH: ICD-10-PCS | Performed by: INTERNAL MEDICINE

## 2020-01-22 PROCEDURE — 02HV33Z INSERTION OF INFUSION DEVICE INTO SUPERIOR VENA CAVA, PERCUTANEOUS APPROACH: ICD-10-PCS | Performed by: INTERNAL MEDICINE

## 2020-01-22 RX ADMIN — LEVOTHYROXINE SODIUM SCH: 112 TABLET ORAL at 05:29

## 2020-01-22 RX ADMIN — GABAPENTIN SCH MG: 300 CAPSULE ORAL at 07:59

## 2020-01-22 RX ADMIN — AZTREONAM SCH MLS/HR: 2 INJECTION, POWDER, FOR SOLUTION INTRAMUSCULAR; INTRAVENOUS at 03:52

## 2020-01-22 RX ADMIN — GABAPENTIN SCH MG: 300 CAPSULE ORAL at 20:26

## 2020-01-22 RX ADMIN — SODIUM CHLORIDE, SODIUM LACTATE, POTASSIUM CHLORIDE, AND CALCIUM CHLORIDE SCH MLS/HR: 600; 310; 30; 20 INJECTION, SOLUTION INTRAVENOUS at 12:58

## 2020-01-22 RX ADMIN — FENTANYL CITRATE PRN MCG: 0.05 INJECTION, SOLUTION INTRAMUSCULAR; INTRAVENOUS at 14:05

## 2020-01-22 RX ADMIN — AZTREONAM SCH MLS/HR: 2 INJECTION, POWDER, FOR SOLUTION INTRAMUSCULAR; INTRAVENOUS at 20:14

## 2020-01-22 RX ADMIN — ALPRAZOLAM PRN MG: 0.25 TABLET ORAL at 07:59

## 2020-01-22 RX ADMIN — IPRATROPIUM BROMIDE AND ALBUTEROL SULFATE SCH NEB: .5; 3 SOLUTION RESPIRATORY (INHALATION) at 17:37

## 2020-01-22 RX ADMIN — IPRATROPIUM BROMIDE AND ALBUTEROL SULFATE SCH NEB: .5; 3 SOLUTION RESPIRATORY (INHALATION) at 22:30

## 2020-01-22 RX ADMIN — ACETAMINOPHEN AND CODEINE PHOSPHATE PRN TAB: 300; 30 TABLET ORAL at 07:58

## 2020-01-22 RX ADMIN — CYCLOBENZAPRINE HYDROCHLORIDE PRN MG: 10 TABLET, FILM COATED ORAL at 07:58

## 2020-01-22 RX ADMIN — ACETAMINOPHEN PRN MG: 325 TABLET ORAL at 14:06

## 2020-01-22 RX ADMIN — ALPRAZOLAM PRN MG: 0.25 TABLET ORAL at 20:09

## 2020-01-22 RX ADMIN — CYCLOBENZAPRINE HYDROCHLORIDE PRN MG: 10 TABLET, FILM COATED ORAL at 20:09

## 2020-01-22 RX ADMIN — DULOXETINE HYDROCHLORIDE SCH MG: 20 CAPSULE, DELAYED RELEASE ORAL at 09:23

## 2020-01-22 RX ADMIN — Medication SCH ML: at 18:21

## 2020-01-22 RX ADMIN — GABAPENTIN SCH MG: 300 CAPSULE ORAL at 14:11

## 2020-01-22 RX ADMIN — AZTREONAM SCH MLS/HR: 2 INJECTION, POWDER, FOR SOLUTION INTRAMUSCULAR; INTRAVENOUS at 13:45

## 2020-01-22 RX ADMIN — NOREPINEPHRINE BITARTRATE SCH MLS/HR: 1 INJECTION INTRAVENOUS at 09:23

## 2020-01-22 RX ADMIN — HEPARIN SODIUM SCH UNITS: 5000 INJECTION INTRAVENOUS; SUBCUTANEOUS at 22:23

## 2020-01-22 RX ADMIN — FOLIC ACID SCH MG: 1 TABLET ORAL at 07:59

## 2020-01-22 RX ADMIN — HEPARIN SODIUM SCH UNITS: 5000 INJECTION INTRAVENOUS; SUBCUTANEOUS at 05:34

## 2020-01-22 RX ADMIN — HEPARIN SODIUM SCH UNITS: 5000 INJECTION INTRAVENOUS; SUBCUTANEOUS at 13:45

## 2020-01-22 RX ADMIN — ACETAMINOPHEN AND CODEINE PHOSPHATE PRN TAB: 300; 30 TABLET ORAL at 20:09

## 2020-01-22 RX ADMIN — NOREPINEPHRINE BITARTRATE SCH MLS/HR: 1 INJECTION INTRAVENOUS at 03:39

## 2020-01-22 NOTE — PN
Date of Service: 20


Critical Care Services: 





Patient seen and examined. Initially refusing vapotherm today, stating she 

couldn't tolerate the device, however, she did become more tachypneic and 

dropped her saturations after laying flat for PICC line insertion this 

afternoon. She is visibly SOB with increased WOB, denies chest pain, no 

headache or fevers, states she feels terrible with pain "all over".








Vital Signs: 











Temp Pulse Resp BP SpO2 FiO2


 


96.8 F 127 20 92/57 98 100


 


20 12:00 20 17:38 20 17:38 20 17:15 20 17:38  17:38











Physical Exam: 


Gen: alert, pale, ill-appearing





HEENT: PERRLA, non icteric sclera, EOMs intact





Lungs: course throughout lung fields bilaterally, ins/exp wheezes with 

scattered rhonchi, poor air entry





Cardiac:  tachycardia, no murmurs gallups or rubs, regular, no ectopy on tele





Abdomen: soft, non-tender, non distended, + BS x4 quad





Extremities: warm and dry, no clubbing or edema





Neuro: a&Ox3, no focal deficits











Fluid Balance (Past 24 Hours): 





 Intake & Output











 20





 06:59 06:59 06:59 06:59


 


Intake Total   2893 700


 


Output Total   500 300


 


Balance   2393 400


 


Weight   223 lb 1.725 oz 


 


Intake:    


 


  IV Fluids   2306 


 


    LR   814 


 


    NS   1492 


 


  IVPB   263 


 


    ABX   263 


 


  Medicated IV   324 


 


    Levophed   324 


 


  Oral    700


 


Output:    


 


  Urine   500 300


 


Other:    


 


  Estimated Void   Small Large


 


  # Voids   1 1








Labs: 


 Laboratory Results - last 24 hr











  20





  14:54 17:28 18:58


 


WBC   


 


RBC   


 


Hgb   


 


Hct   


 


MCV   


 


MCH   


 


MCHC   


 


RDW   


 


Plt Count   


 


MPV   


 


Neut % (Auto)   


 


Lymph % (Auto)   


 


Mono % (Auto)   


 


Eos % (Auto)   


 


Baso % (Auto)   


 


Absolute Neuts (auto)   


 


Absolute Lymphs (auto)   


 


Absolute Monos (auto)   


 


Absolute Eos (auto)   


 


Absolute Basos (auto)   


 


Absolute Nucleated RBC   


 


Immature Gran %   


 


Neutrophils %   


 


Band Neutrophils %   


 


Lymphocytes %   


 


Monocytes %   


 


Nucleated RBC %   


 


Normal RBC Morphology   


 


Polychromasia   


 


Hypochromasia   


 


Anisocytosis   


 


Microcytosis   


 


Tear Drop Cells   


 


Elliptocytes   


 


Sodium   


 


Potassium   


 


Chloride   


 


Carbon Dioxide   


 


Anion Gap   


 


BUN   


 


Creatinine   


 


Est GFR ( Amer)   


 


Est GFR (Non-Af Amer)   


 


BUN/Creatinine Ratio   


 


Glucose   


 


Lactic Acid    3.4 H*


 


Calcium   


 


Iron   


 


TIBC   


 


% Saturation   


 


Unsat Iron Binding   


 


Transferrin   


 


Ferritin   


 


Troponin I   


 


Vitamin B12   


 


Folate   


 


Influenza A (Rapid)   Negative 


 


Influenza B (Rapid)   Negative 


 


Blood Type  O Positive  


 


Antibody Screen  Negative  


 


Crossmatch  See Detail  














  20





  18:58 21:39 23:49


 


WBC   


 


RBC   


 


Hgb   


 


Hct   


 


MCV   


 


MCH   


 


MCHC   


 


RDW   


 


Plt Count   


 


MPV   


 


Neut % (Auto)   


 


Lymph % (Auto)   


 


Mono % (Auto)   


 


Eos % (Auto)   


 


Baso % (Auto)   


 


Absolute Neuts (auto)   


 


Absolute Lymphs (auto)   


 


Absolute Monos (auto)   


 


Absolute Eos (auto)   


 


Absolute Basos (auto)   


 


Absolute Nucleated RBC   


 


Immature Gran %   


 


Neutrophils %   


 


Band Neutrophils %   


 


Lymphocytes %   


 


Monocytes %   


 


Nucleated RBC %   


 


Normal RBC Morphology   


 


Polychromasia   


 


Hypochromasia   


 


Anisocytosis   


 


Microcytosis   


 


Tear Drop Cells   


 


Elliptocytes   


 


Sodium   


 


Potassium   


 


Chloride   


 


Carbon Dioxide   


 


Anion Gap   


 


BUN   


 


Creatinine   


 


Est GFR ( Amer)   


 


Est GFR (Non-Af Amer)   


 


BUN/Creatinine Ratio   


 


Glucose   


 


Lactic Acid    3.6 H*


 


Calcium   


 


Iron   


 


TIBC   


 


% Saturation   


 


Unsat Iron Binding   


 


Transferrin   


 


Ferritin   


 


Troponin I  0.01  0.02 


 


Vitamin B12   


 


Folate   


 


Influenza A (Rapid)   


 


Influenza B (Rapid)   


 


Blood Type   


 


Antibody Screen   


 


Crossmatch   














  20





  00:00 03:32 03:32


 


WBC    18.2 H


 


RBC    3.64 L


 


Hgb    6.8 L


 


Hct    24 L


 


MCV    67 L


 


MCH    19 L


 


MCHC    28 L


 


RDW    21 H


 


Plt Count    353


 


MPV    7.5


 


Neut % (Auto)    92.5


 


Lymph % (Auto)    3.7


 


Mono % (Auto)    3.0


 


Eos % (Auto)    0.4


 


Baso % (Auto)    0.4


 


Absolute Neuts (auto)    16.8 H


 


Absolute Lymphs (auto)    0.7 L


 


Absolute Monos (auto)    0.5


 


Absolute Eos (auto)    0.1


 


Absolute Basos (auto)    0.1


 


Absolute Nucleated RBC    0.0


 


Immature Gran %    14.0 H


 


Neutrophils %    80.0


 


Band Neutrophils %    14.0 H


 


Lymphocytes %    4.0


 


Monocytes %    2.0


 


Nucleated RBC %    0.0


 


Normal RBC Morphology    Not Reportable


 


Polychromasia    1+


 


Hypochromasia    1+


 


Anisocytosis    2+


 


Microcytosis    2+


 


Tear Drop Cells    1+


 


Elliptocytes    1+


 


Sodium   


 


Potassium   


 


Chloride   


 


Carbon Dioxide   


 


Anion Gap   


 


BUN   


 


Creatinine   


 


Est GFR ( Amer)   


 


Est GFR (Non-Af Amer)   


 


BUN/Creatinine Ratio   


 


Glucose   


 


Lactic Acid   2.8 H* 


 


Calcium   


 


Iron   


 


TIBC   


 


% Saturation   


 


Unsat Iron Binding   


 


Transferrin   


 


Ferritin   


 


Troponin I  0.04 H*  


 


Vitamin B12   


 


Folate   


 


Influenza A (Rapid)   


 


Influenza B (Rapid)   


 


Blood Type   


 


Antibody Screen   


 


Crossmatch   














  20





  03:32 03:32 12:35


 


WBC   


 


RBC   


 


Hgb   


 


Hct   


 


MCV   


 


MCH   


 


MCHC   


 


RDW   


 


Plt Count   


 


MPV   


 


Neut % (Auto)   


 


Lymph % (Auto)   


 


Mono % (Auto)   


 


Eos % (Auto)   


 


Baso % (Auto)   


 


Absolute Neuts (auto)   


 


Absolute Lymphs (auto)   


 


Absolute Monos (auto)   


 


Absolute Eos (auto)   


 


Absolute Basos (auto)   


 


Absolute Nucleated RBC   


 


Immature Gran %   


 


Neutrophils %   


 


Band Neutrophils %   


 


Lymphocytes %   


 


Monocytes %   


 


Nucleated RBC %   


 


Normal RBC Morphology   


 


Polychromasia   


 


Hypochromasia   


 


Anisocytosis   


 


Microcytosis   


 


Tear Drop Cells   


 


Elliptocytes   


 


Sodium  139  


 


Potassium  4.4  


 


Chloride  109  


 


Carbon Dioxide  21 L  


 


Anion Gap  9  


 


BUN  19  


 


Creatinine  0.83  


 


Est GFR ( Amer)  81.8  


 


Est GFR (Non-Af Amer)  67.6  


 


BUN/Creatinine Ratio  22.9 H  


 


Glucose  115 H  


 


Lactic Acid    2.2 H*


 


Calcium  7.5 L  


 


Iron  < 20 L  


 


TIBC  435  


 


% Saturation  5 L  


 


Unsat Iron Binding  < 420  


 


Transferrin  311  


 


Ferritin  22.7  


 


Troponin I   


 


Vitamin B12  168 L  


 


Folate  > 20.00  


 


Influenza A (Rapid)   


 


Influenza B (Rapid)   


 


Blood Type   O Positive 


 


Antibody Screen   


 


Crossmatch   











Studies: 





Patient Name:          RK HAN                                         

                              Medical Record#: [id number]


Ordering Physician: Lauro Ravi MD                                      

                              Acct.#: [id number]


:     1945           Age: 74   Sex: F                                 

                              Location: EMERGENCY DEPARTMENT


Exam Date: 20 1506                                                       

                              ADM Status: REG ER


Order Information:                            CTA CHEST/ABD/PEL


Accession Number:                             [id number]


CPT:                                          23642


HISTORY: cp, hypoxia





COMPARISONS: 2019, chest radiograph dated 2019





TECHNIQUE: Multiple contiguous axial CT scans were obtained of the chest, 

abdomen, and


pelvis after the administration of intravenous contrast. Coronal and sagittal 

multiplanar


reformations are submitted for review.. 3-D volumetric reconstructions of the 

aorta are


also submitted for review.








FINDINGS:





CHEST





NECK AND THYROID: The lower neck and thyroid are unremarkable.


CHEST WALL: There is no lower cervical, axillary, or supraclavicular 

lymphadenopathy by


size criteria.





HEART AND PERICARDIUM: The heart is unremarkable.


AORTA AND PULMONARY VASCULATURE: The aorta is tortuous. There is mild 

atherosclerosis of


the thoracic aorta. There is no pulmonary arterial filling defect to suggest 

pulmonary


embolism. There is no thoracic aortic aneurysm. There is no intraluminal flap 

to suggest


dissection.





MEDIASTINUM: There are subcentimeter short axis mediastinal lymph nodes. There 

is no


mediastinal lymphadenopathy by size criteria.


EDD: There is no hilar lymphadenopathy by size criteria.





AIRWAY AND ESOPHAGUS: The airway is unremarkable, without endobronchial filling 

defect.


The esophagus is grossly normal.


LUNG PARENCHYMA: There is a diffuse pattern of groundglass opacification 

consolidation


throughout the right lung and to lesser extent of the left lung base. There is 

subpleural


interlobular septal thickening and honeycombing most pronounced in the left 

lower lung,


similar to the previous CT examination


PLEURA: No pleural abnormalities are noted.





BONES AND SOFT TISSUES: Degenerative changes are noted.





ABDOMEN/PELVIS:





LIVER: The liver is normal in shape, size, contour, and attenuation.


BILE DUCTS: There is no intrahepatic or extrahepatic biliary dilatation.


GALLBLADDER: The gallbladder is not visualized. Surgical clips are noted in the


gallbladder fossa.





PANCREAS: The pancreas is normal, without mass or ductal dilatation.


SPLEEN: Normal in size and appearance.





UPPER GI TRACT: Evaluation of the gastrointestinal tract is limited by 

incomplete gastric


distention. The upper GI tract is unremarkable.


SMALL BOWEL & MESENTERY:  The small bowel is normal in contour, course, and 

caliber.


[facility] IMAGING


Patient Name:RK HAN                                                MR:

[id number]                                      : 1945











There is no obstruction or dilatation.


COLON:   A colostomy is noted. There is a parastomal fat-containing hernia. 

There are


diverticula of the descending colon and the rectosigmoid stump. There is a 

rectocele.





ADRENALS: Normal bilaterally.


KIDNEYS: The kidneys are normal in shape, size, contour, and axis. There is no


hydronephrosis or nephrolithiasis.


BLADDER: There is a cystocele.





PELVIC ORGANS: The pelvic organs are not visualized.





AORTA: There is aneurysmal dilatation of the infrarenal abdominal aorta. This 

measures


approximately 4.8 x 4.7 cm in transverse orthogonal dimension, somewhat 

increased from 4.2


x 4.5 cm when measured at comparable levels on the previous CT examination. 

There is


atherosclerosis of the aorta and its branches.


IVC: Unremarkable





LYMPH NODES: There is no lymphadenopathy by size criteria.





ABDOMINAL WALL: There is no evidence for abdominal wall hernia.


BONES AND SOFT TISSUES: There are mild diffuse degenerative changes.


OTHER: None





IMPRESSION:


1.  NO PULMONARY ARTERIAL FILLING DEFECT TO SUGGEST PULMONARY EMBOLISM.


2.  NO AORTIC INTIMAL FLAP TO SUGGEST DISSECTION.


3.  EXTENSIVE AIRSPACE DISEASE THROUGHOUT THE RIGHT LUNG AND IN THE LEFT LUNG 

BASE.


4.  AGAIN NOTED ARE MILD FIBROTIC CHANGES.


5.  INFRARENAL ABDOMINAL AORTIC ANEURYSM MEASURING UP TO 4.5 CM, SLIGHTLY 

INCREASED IN


SIZE FROM 2019.


6.  STATUS POST COLOSTOMY.


7.  DIVERTICULOSIS


8.  RECTOCYSTOCELE.


9.  ATHEROSCLEROSIS..








____________________________________________________________





Nutrition: 





Regular unrestricted diet








Impression: 





Mrs Han is a 75yo F with PMH of rheumatoid arthritis, chronic pain, CAD, 

HTN, hypothyroidism, who presented to ED with dyspnea, initially diagnosed on 1/

3/2020 with PNA, found to now have severe sepsis secondary to CAP, failed 

outpatient treatment.





Diagnoses:





1. Bilateral Klebsiella Pneumonia/CAP


2. Acute Hypoxic Respiratory Failure


3. Acute on Chronic Iron Deficient Anemia


4. Severe Sepsis 2/2 CAP


5. Hx of chronic pain 2/2 RA


6. Hx of COPD


7. Hx of Hypertrophic Cardiomyopathy


Plan: 





Neuro


 - Alert, mentating appropriately


 - Supportive care





CV


 - Sinus tach in setting of sepsis/PNA


 - Titrating norepinephrine to MAP of 65


 - Continue tele


 - Continue IVF and monitor for overload


 - PICC line inserted


 - Mild troponinemia in setting of demand ischemia, no chest pain at present, 

however, had chest pain in ED which is resolved. No EKG changes noted and per 

report, no resport to NTG. Seems chest pain was r/t pulmonary source and is now 

improved


 - May benefit from ischemic workup after acute illness is resolved, consider 

echo if any further issues or concern for NSTEMI





Pulm


 - Start duonebs Q4h with metanebs to mobilize secretions, appears to have 

plugged when laying flat for PICC line and desatted on salter


 - Titrate vapotherm and wean as tolerated, RR improved


 - Narrow to aztreonam based on sputum today, levaquin DCd


 - Continue pulmonary toilet





GI


 - Regular diet


 - Colostomy care





Renal


 - Renal function normal with appropriate UOP





Musculoskeletal/Skin


 - OOB to chair tomorrow


 - Continue home pain meds for RA/chronic pain, limit for sedation


 - PT/OT





Endocrine


 - No issues, continue home dose levothyroxine





ID


 - Positive klebsiella in sputum


 - CXR as above with bilateral infiltrates


 - Legionella and strep pneumo antigens are negative


 - Follow blood cx NTD, remains afebrile


 - Continue aztreonam, follow sensitivities


 - Allergy to PCN





Psychosocial


 - Anxiolytics PRN





DVT Prophy - HSQ Q8h





Lines: Right UE PICC inserted 2020





Code Status: DNR/DNI, ok for pressors and non-invasive respiratory intervention





Critical Care Time: 60 minutes

## 2020-01-22 NOTE — PN
Hospitalist Progress Note


Date of Service: 01/22/20





ON-CALL MD NOTE:





8:52PM: Was called for tachypnea, tachycardia.





Upon evaluating, patient is in respiratory distress.


Having tachypnea, with crackles and wheezing


regular tachycardia.





- SOB: could be due to fluid overload vs. COPD /bronchospams/reactive airway: 

will give one dose solumedrol 125mg IV X one, then solu-medrol 40mg IV Q8hrs, 

continue nebs, d/c IV fluids, check Chest-xray and re-evaluate.








12:05AM: 


Upon re-evaluation, tachycardia and tachypnea are improving. RR: 22, HR: 128


Chest xray: almost complete opacification of the right lung field, and 

pulmonary edema at the left lung field.





Will make NPO


incentive spirometry


May require bronchoscopy for possible mucus plugging.


Continue to monitor. 


Has improved, if respiratory status worsens, can give lasix, IV fluids were d/c'

ed earlier.

## 2020-01-23 LAB
ANION GAP SERPL CALC-SCNC: 7 MMOL/L (ref 2–11)
BASOPHILS # BLD AUTO: 0 10^3/UL (ref 0–0.2)
BUN SERPL-MCNC: 19 MG/DL (ref 6–24)
BUN/CREAT SERPL: 28.8 (ref 8–20)
CALCIUM SERPL-MCNC: 7.1 MG/DL (ref 8.6–10.3)
CHLORIDE SERPL-SCNC: 111 MMOL/L (ref 101–111)
EOSINOPHIL # BLD AUTO: 0 10^3/UL (ref 0–0.6)
GLUCOSE SERPL-MCNC: 157 MG/DL (ref 70–100)
HCO3 SERPL-SCNC: 19 MMOL/L (ref 22–32)
HCT VFR BLD AUTO: 26 % (ref 35–47)
HGB BLD-MCNC: 7.9 G/DL (ref 12–16)
LYMPHOCYTES # BLD AUTO: 0.2 10^3/UL (ref 1–4.8)
MCH RBC QN AUTO: 21 PG (ref 27–31)
MCHC RBC AUTO-ENTMCNC: 30 G/DL (ref 31–36)
MCV RBC AUTO: 69 FL (ref 80–97)
MICROCYTES BLD QL SMEAR: (no result)
MONOCYTES # BLD AUTO: 0.2 10^3/UL (ref 0–0.8)
NEUTROPHILS # BLD AUTO: 15.6 10^3/UL (ref 1.5–7.7)
NRBC # BLD AUTO: 0 10^3/UL
NRBC BLD QL AUTO: 0.1
PLATELET # BLD AUTO: 281 10^3/UL (ref 150–450)
POLYCHROMASIA BLD QL SMEAR: (no result)
POTASSIUM SERPL-SCNC: 4 MMOL/L (ref 3.5–5)
RBC # BLD AUTO: 3.85 10^6 /UL (ref 3.7–4.87)
SODIUM SERPL-SCNC: 137 MMOL/L (ref 135–145)
WBC # BLD AUTO: 16.1 10^3/UL (ref 3.5–10.8)

## 2020-01-23 RX ADMIN — METHYLPREDNISOLONE SODIUM SUCCINATE SCH MG: 40 INJECTION, POWDER, FOR SOLUTION INTRAMUSCULAR; INTRAVENOUS at 21:38

## 2020-01-23 RX ADMIN — ALBUTEROL SULFATE SCH MG: 2.5 SOLUTION RESPIRATORY (INHALATION) at 12:17

## 2020-01-23 RX ADMIN — METHYLPREDNISOLONE SODIUM SUCCINATE SCH MG: 40 INJECTION, POWDER, FOR SOLUTION INTRAMUSCULAR; INTRAVENOUS at 14:26

## 2020-01-23 RX ADMIN — METHYLPREDNISOLONE SODIUM SUCCINATE SCH MG: 40 INJECTION, POWDER, FOR SOLUTION INTRAMUSCULAR; INTRAVENOUS at 05:53

## 2020-01-23 RX ADMIN — GABAPENTIN SCH MG: 300 CAPSULE ORAL at 21:38

## 2020-01-23 RX ADMIN — Medication SCH ML: at 17:49

## 2020-01-23 RX ADMIN — IPRATROPIUM BROMIDE AND ALBUTEROL SULFATE SCH NEB: .5; 3 SOLUTION RESPIRATORY (INHALATION) at 04:03

## 2020-01-23 RX ADMIN — GABAPENTIN SCH MG: 300 CAPSULE ORAL at 14:32

## 2020-01-23 RX ADMIN — FOLIC ACID SCH MG: 1 TABLET ORAL at 10:10

## 2020-01-23 RX ADMIN — ACETAMINOPHEN AND CODEINE PHOSPHATE PRN TAB: 300; 30 TABLET ORAL at 19:40

## 2020-01-23 RX ADMIN — AZTREONAM SCH MLS/HR: 2 INJECTION, POWDER, FOR SOLUTION INTRAMUSCULAR; INTRAVENOUS at 04:31

## 2020-01-23 RX ADMIN — ALBUTEROL SULFATE SCH MG: 2.5 SOLUTION RESPIRATORY (INHALATION) at 19:23

## 2020-01-23 RX ADMIN — Medication SCH ML: at 05:56

## 2020-01-23 RX ADMIN — HEPARIN SODIUM SCH UNITS: 5000 INJECTION INTRAVENOUS; SUBCUTANEOUS at 21:38

## 2020-01-23 RX ADMIN — FENTANYL CITRATE PRN MCG: 0.05 INJECTION, SOLUTION INTRAMUSCULAR; INTRAVENOUS at 17:46

## 2020-01-23 RX ADMIN — CYCLOBENZAPRINE HYDROCHLORIDE PRN MG: 10 TABLET, FILM COATED ORAL at 21:37

## 2020-01-23 RX ADMIN — DULOXETINE HYDROCHLORIDE SCH MG: 20 CAPSULE, DELAYED RELEASE ORAL at 10:10

## 2020-01-23 RX ADMIN — LEVOTHYROXINE SODIUM SCH: 112 TABLET ORAL at 05:49

## 2020-01-23 RX ADMIN — GABAPENTIN SCH MG: 300 CAPSULE ORAL at 10:10

## 2020-01-23 RX ADMIN — AZTREONAM SCH MLS/HR: 2 INJECTION, POWDER, FOR SOLUTION INTRAMUSCULAR; INTRAVENOUS at 19:37

## 2020-01-23 RX ADMIN — LEVOTHYROXINE SODIUM SCH MCG: 112 TABLET ORAL at 10:10

## 2020-01-23 RX ADMIN — HEPARIN SODIUM SCH UNITS: 5000 INJECTION INTRAVENOUS; SUBCUTANEOUS at 14:30

## 2020-01-23 RX ADMIN — IPRATROPIUM BROMIDE AND ALBUTEROL SULFATE SCH: .5; 3 SOLUTION RESPIRATORY (INHALATION) at 11:06

## 2020-01-23 RX ADMIN — AZTREONAM SCH MLS/HR: 2 INJECTION, POWDER, FOR SOLUTION INTRAMUSCULAR; INTRAVENOUS at 11:05

## 2020-01-23 RX ADMIN — IPRATROPIUM BROMIDE AND ALBUTEROL SULFATE SCH NEB: .5; 3 SOLUTION RESPIRATORY (INHALATION) at 07:19

## 2020-01-23 RX ADMIN — HEPARIN SODIUM SCH UNITS: 5000 INJECTION INTRAVENOUS; SUBCUTANEOUS at 05:54

## 2020-01-23 NOTE — PN
Date of Service: 20


Critical Care Services: 





Patient seen and examined. Events from overnight noted. CXR from overnight 

reviewed. O2 sats >94% on vapotherm throughout the day today, remains tachy, 

but no hypotension and no fever. Has generalized complaints of pain, but denies 

chest pain. Does appear dyspneic but this correlated to when patient removed 

vapotherm and had some confusion. Currently, she appears to be appropriate and 

more comfortable.


Vital Signs: 











Temp Pulse Resp BP SpO2 FiO2


 


99.2 F 114 22 140/91 100 100


 


20 16:00 20 14:00 20 14:00 20 14:00 20 14:00  12:18











Physical Exam: 


Gen: Alert, pale, ill appearing





HEENT: PERRLA, non-icteric sclera





Lungs: course, rhonchorous, improved air entry bilaterally





Cardiac:  +s1s2, tachycardic, no murmurs or rubs





Abdomen: positive BS x 4 quad, non distended, non tender, colostomy in place LLQ





Extremities: No clubbing or edema, dressing to right knee, tenderness over 

bilateral knees, some generalized pain with palpation over large joints





Neuro: A&Ox3, no focal deficits





Fluid Balance (Past 24 Hours): 


Intake & Output











 20





 06:59 06:59 06:59 06:59


 


Intake Total  2893 3929.7 748


 


Output Total  500 300 


 


Balance  2393 3629.7 748


 


Weight  223 lb 1.725 oz  


 


Intake:    


 


  IV Fluids  2306 1481 348


 


    ABX   341 


 


    LR  814 663 164


 


    NS  1492 477 184


 


  IVPB  263  


 


    ABX  263  


 


  Medicated IV  324 201.7 


 


    Levophed  324 201.7 


 


  Oral   1660 400


 


  Packed Cells   587 


 


Output:    


 


  Urine  500 300 


 


Other:    


 


  Estimated Void  Small Large Large


 


  # Voids  1 1 500








Labs: 


 Laboratory Results - last 24 hr











  20





  04:35 04:35


 


WBC  16.1 H 


 


RBC  3.85 


 


Hgb  7.9 L 


 


Hct  26 L 


 


MCV  69 L 


 


MCH  21 L 


 


MCHC  30 L 


 


RDW  24 H 


 


Plt Count  281 


 


MPV  8.6 


 


Neut % (Auto)  97.0 


 


Lymph % (Auto)  1.4 


 


Mono % (Auto)  1.2 


 


Eos % (Auto)  0.2 


 


Baso % (Auto)  0.2 


 


Absolute Neuts (auto)  15.6 H 


 


Absolute Lymphs (auto)  0.2 L 


 


Absolute Monos (auto)  0.2 


 


Absolute Eos (auto)  0.0 


 


Absolute Basos (auto)  0.0 


 


Absolute Nucleated RBC  0.0 


 


Nucleated RBC %  0.1 


 


Polychromasia  1+ 


 


Anisocytosis  2+ 


 


Microcytosis  1+ 


 


Macrocytosis  1+ 


 


Hem Pathologist Commnt   


 


Sodium   137


 


Potassium   4.0


 


Chloride   111


 


Carbon Dioxide   19 L


 


Anion Gap   7


 


BUN   19


 


Creatinine   0.66


 


Est GFR ( Amer)   106.5


 


Est GFR (Non-Af Amer)   88.0


 


BUN/Creatinine Ratio   28.8 H


 


Glucose   157 H


 


Calcium   7.1 L











Studies: 





Patient Name:          RK HAN                                         

                              Medical Record#: [id number]


Ordering Physician: Marilou Levine MD                                          

                              Acct.#: [id number]


:     1945           Age: 74   Sex: F                                 

                              Location: INTENSIVE CARE UNIT


Exam Date: 20                                                       

                              ADM Status: ADM IN


Order Information:                            CHEST AP/PORT


Accession Number:                             [id number]


CPT:                                          [id number]


INDICATION:  Shortness of breath.





COMPARISON:  Comparison is made with prior chest x-ray studies from 2019 and


2020. Correlation is also made with a CT of the chest from 2020.





TECHNIQUE: 2 portable films of the chest were obtained.





FINDINGS: The heart appears within normal limits in size. There is a PICC 

present entering


on the right side. The catheter tip projects over the right atrium.





There is a dense infiltrate present throughout the right lung which has 

progressed from


the prior study. There is also a patchy infiltrate in the left midlung which is 

new.  





IMPRESSION:  BILATERAL INFILTRATES WITH INTERVAL PROGRESSION.








____________________________________________________________





Patient Name:          RK HAN                                         

                              Medical Record#: [id number]


Ordering Physician: Lauro Ravi MD                                      

                              Acct.#: [id number]


:     1945           Age: 74   Sex: F                                 

                              Location: EMERGENCY DEPARTMENT


Exam Date: 20 1506                                                       

                              ADM Status: REG ER


Order Information:                            CTA CHEST/ABD/PEL


Accession Number:                             [id number]


CPT:                                          [id number]


HISTORY: cp, hypoxia





COMPARISONS: 2019, chest radiograph dated 2019





TECHNIQUE: Multiple contiguous axial CT scans were obtained of the chest, 

abdomen, and


pelvis after the administration of intravenous contrast. Coronal and sagittal 

multiplanar


reformations are submitted for review.. 3-D volumetric reconstructions of the 

aorta are


also submitted for review.








FINDINGS:





CHEST





NECK AND THYROID: The lower neck and thyroid are unremarkable.


CHEST WALL: There is no lower cervical, axillary, or supraclavicular 

lymphadenopathy by


size criteria.





HEART AND PERICARDIUM: The heart is unremarkable.


AORTA AND PULMONARY VASCULATURE: The aorta is tortuous. There is mild 

atherosclerosis of


the thoracic aorta. There is no pulmonary arterial filling defect to suggest 

pulmonary


embolism. There is no thoracic aortic aneurysm. There is no intraluminal flap 

to suggest


dissection.





MEDIASTINUM: There are subcentimeter short axis mediastinal lymph nodes. There 

is no


mediastinal lymphadenopathy by size criteria.


EDD: There is no hilar lymphadenopathy by size criteria.





AIRWAY AND ESOPHAGUS: The airway is unremarkable, without endobronchial filling 

defect.


The esophagus is grossly normal.


LUNG PARENCHYMA: There is a diffuse pattern of groundglass opacification 

consolidation


throughout the right lung and to lesser extent of the left lung base. There is 

subpleural


interlobular septal thickening and honeycombing most pronounced in the left 

lower lung,


similar to the previous CT examination


PLEURA: No pleural abnormalities are noted.





BONES AND SOFT TISSUES: Degenerative changes are noted.





ABDOMEN/PELVIS:





LIVER: The liver is normal in shape, size, contour, and attenuation.


BILE DUCTS: There is no intrahepatic or extrahepatic biliary dilatation.


GALLBLADDER: The gallbladder is not visualized. Surgical clips are noted in the


gallbladder fossa.





PANCREAS: The pancreas is normal, without mass or ductal dilatation.


SPLEEN: Normal in size and appearance.





UPPER GI TRACT: Evaluation of the gastrointestinal tract is limited by 

incomplete gastric


distention. The upper GI tract is unremarkable.


SMALL BOWEL & MESENTERY:  The small bowel is normal in contour, course, and 

caliber.


[facility]


Patient Name:RK HAN                                                MR:

[id number]                                      : 1945











There is no obstruction or dilatation.


COLON:   A colostomy is noted. There is a parastomal fat-containing hernia. 

There are


diverticula of the descending colon and the rectosigmoid stump. There is a 

rectocele.





ADRENALS: Normal bilaterally.


KIDNEYS: The kidneys are normal in shape, size, contour, and axis. There is no


hydronephrosis or nephrolithiasis.


BLADDER: There is a cystocele.





PELVIC ORGANS: The pelvic organs are not visualized.





AORTA: There is aneurysmal dilatation of the infrarenal abdominal aorta. This 

measures


approximately 4.8 x 4.7 cm in transverse orthogonal dimension, somewhat 

increased from 4.2


x 4.5 cm when measured at comparable levels on the previous CT examination. 

There is


atherosclerosis of the aorta and its branches.


IVC: Unremarkable





LYMPH NODES: There is no lymphadenopathy by size criteria.





ABDOMINAL WALL: There is no evidence for abdominal wall hernia.


BONES AND SOFT TISSUES: There are mild diffuse degenerative changes.


OTHER: None





IMPRESSION:


1.  NO PULMONARY ARTERIAL FILLING DEFECT TO SUGGEST PULMONARY EMBOLISM.


2.  NO AORTIC INTIMAL FLAP TO SUGGEST DISSECTION.


3.  EXTENSIVE AIRSPACE DISEASE THROUGHOUT THE RIGHT LUNG AND IN THE LEFT LUNG 

BASE.


4.  AGAIN NOTED ARE MILD FIBROTIC CHANGES.


5.  INFRARENAL ABDOMINAL AORTIC ANEURYSM MEASURING UP TO 4.5 CM, SLIGHTLY 

INCREASED IN


SIZE FROM 2019.


6.  STATUS POST COLOSTOMY.


7.  DIVERTICULOSIS


8.  RECTOCYSTOCELE.


9.  ATHEROSCLEROSIS..








____________________________________________________________





Nutrition: 





Regular diet








Impression: 





Mrs Han is a 75yo F with PMH of rheumatoid arthritis, chronic pain, CAD, 

HTN, hypothyroidism, who presented to ED with dyspnea, initially diagnosed on 1/

3/2020 with PNA, found to now have severe sepsis secondary to CAP, failed 

outpatient treatment.





Diagnoses:





1. Bilateral Klebsiella Pneumonia/CAP


2. Acute Hypoxic Respiratory Failure


3. Acute on Chronic Iron Deficient Anemia


4. Severe Sepsis 2/2 CAP


5. Hx of chronic pain 2/2 RA


6. Hx of COPD


7. Hx of Hypertrophic Cardiomyopathy





Plan: 








Neuro


 - Alert, mentating appropriately


 - Supportive care





CV


 - Sinus tach in setting of sepsis/PNA


 - Titrating norepinephrine to MAP of 65


 - Continue tele


 - IVF DCd yesterday, but remains fluid positive. Will check ECHO given hx of 

cardiomyopathy


 - PICC line inserted


 - Mild troponinemia in setting of demand ischemia, no chest pain at present, 

however, had chest pain in ED which is resolved. No EKG changes noted and per 

report, no resport to NTG. Seems chest pain was r/t pulmonary source and is now 

improved


 - May benefit from ischemic workup after acute illness is resolved, consider 

echo if any further issues or concern for NSTEMI





Pulm


 - Continue duonebs Q4h with chest PT vest to mobilize secretions, CXR with 

interval progression of large right sided infiltrate, however, patient does 

appear to be clinically stable on vapotherm


 - Continue aztreonam based on sputum cx, levaquin DCd yesterday


 - Continue pulmonary toilet





GI


 - Regular diet


 - Colostomy care





Renal


 - Renal function normal with appropriate UOP





Musculoskeletal/Skin


 - OOB to chair when she can tolerated, will try tomorrow


 - Continue home pain meds for RA/chronic pain, limit for sedation


 - PT/OT





Endocrine


 - No issues, continue home dose levothyroxine





ID


 - Positive klebsiella in sputum


 - Repeat CXR with worsening bilateral infiltrates R>L


 - Legionella and strep pneumo antigens are negative


 - Follow blood cx NTD, remains afebrile


 - Continue aztreonam, follow sensitivities


 - Allergy to PCN


 - WBC count slightly improved, 16.3 today





HEME


 - Iron deficiency, and losses noted in setting of sepsis and acute illness


 - 1 unit PRBCs transfused 2020; H&H 7.9/ today, continue to follow





Psychosocial


 - Anxiolytics PRN





DVT Prophy - HSQ Q8h





Lines: Right UE PICC inserted 2020





Code Status: DNR/DNI, ok for pressors and non-invasive respiratory intervention





Critical Care Time: 65 minutes

## 2020-01-24 LAB
BASOPHILS # BLD AUTO: 0 10^3/UL (ref 0–0.2)
EOSINOPHIL # BLD AUTO: 0 10^3/UL (ref 0–0.6)
HCT VFR BLD AUTO: 25 % (ref 35–47)
HGB BLD-MCNC: 7.4 G/DL (ref 12–16)
LYMPHOCYTES # BLD AUTO: 0.3 10^3/UL (ref 1–4.8)
MCH RBC QN AUTO: 20 PG (ref 27–31)
MCHC RBC AUTO-ENTMCNC: 30 G/DL (ref 31–36)
MCV RBC AUTO: 69 FL (ref 80–97)
MONOCYTES # BLD AUTO: 0.3 10^3/UL (ref 0–0.8)
NEUTROPHILS # BLD AUTO: 14.4 10^3/UL (ref 1.5–7.7)
NRBC # BLD AUTO: 0 10^3/UL
NRBC BLD QL AUTO: 0.1
PLATELET # BLD AUTO: 317 10^3/UL (ref 150–450)
RBC # BLD AUTO: 3.67 10^6 /UL (ref 3.7–4.87)
WBC # BLD AUTO: 15.1 10^3/UL (ref 3.5–10.8)

## 2020-01-24 RX ADMIN — AZTREONAM SCH MLS/HR: 2 INJECTION, POWDER, FOR SOLUTION INTRAMUSCULAR; INTRAVENOUS at 11:35

## 2020-01-24 RX ADMIN — FOLIC ACID SCH MG: 1 TABLET ORAL at 11:12

## 2020-01-24 RX ADMIN — HEPARIN SODIUM SCH UNITS: 5000 INJECTION INTRAVENOUS; SUBCUTANEOUS at 14:27

## 2020-01-24 RX ADMIN — GABAPENTIN SCH MG: 300 CAPSULE ORAL at 11:12

## 2020-01-24 RX ADMIN — METHYLPREDNISOLONE SODIUM SUCCINATE SCH MG: 40 INJECTION, POWDER, FOR SOLUTION INTRAMUSCULAR; INTRAVENOUS at 21:57

## 2020-01-24 RX ADMIN — FENTANYL CITRATE PRN MCG: 0.05 INJECTION, SOLUTION INTRAMUSCULAR; INTRAVENOUS at 15:08

## 2020-01-24 RX ADMIN — Medication SCH: at 18:36

## 2020-01-24 RX ADMIN — GABAPENTIN SCH MG: 300 CAPSULE ORAL at 20:17

## 2020-01-24 RX ADMIN — AZTREONAM SCH MLS/HR: 2 INJECTION, POWDER, FOR SOLUTION INTRAMUSCULAR; INTRAVENOUS at 20:14

## 2020-01-24 RX ADMIN — AZTREONAM SCH MLS/HR: 2 INJECTION, POWDER, FOR SOLUTION INTRAMUSCULAR; INTRAVENOUS at 04:14

## 2020-01-24 RX ADMIN — METHYLPREDNISOLONE SODIUM SUCCINATE SCH MG: 40 INJECTION, POWDER, FOR SOLUTION INTRAMUSCULAR; INTRAVENOUS at 05:16

## 2020-01-24 RX ADMIN — HEPARIN SODIUM SCH UNITS: 5000 INJECTION INTRAVENOUS; SUBCUTANEOUS at 05:16

## 2020-01-24 RX ADMIN — ACETAMINOPHEN PRN MG: 325 TABLET ORAL at 11:38

## 2020-01-24 RX ADMIN — Medication SCH ML: at 05:16

## 2020-01-24 RX ADMIN — FENTANYL CITRATE PRN MCG: 0.05 INJECTION, SOLUTION INTRAMUSCULAR; INTRAVENOUS at 05:15

## 2020-01-24 RX ADMIN — ALBUTEROL SULFATE SCH: 2.5 SOLUTION RESPIRATORY (INHALATION) at 01:33

## 2020-01-24 RX ADMIN — ALBUTEROL SULFATE SCH: 2.5 SOLUTION RESPIRATORY (INHALATION) at 08:56

## 2020-01-24 RX ADMIN — ALBUTEROL SULFATE SCH: 2.5 SOLUTION RESPIRATORY (INHALATION) at 14:12

## 2020-01-24 RX ADMIN — HEPARIN SODIUM SCH UNITS: 5000 INJECTION INTRAVENOUS; SUBCUTANEOUS at 21:56

## 2020-01-24 RX ADMIN — GABAPENTIN SCH MG: 300 CAPSULE ORAL at 14:28

## 2020-01-24 RX ADMIN — LEVOTHYROXINE SODIUM SCH MCG: 112 TABLET ORAL at 05:40

## 2020-01-24 RX ADMIN — DULOXETINE HYDROCHLORIDE SCH MG: 20 CAPSULE, DELAYED RELEASE ORAL at 11:12

## 2020-01-24 RX ADMIN — FENTANYL CITRATE PRN MCG: 0.05 INJECTION, SOLUTION INTRAMUSCULAR; INTRAVENOUS at 18:22

## 2020-01-24 RX ADMIN — METHYLPREDNISOLONE SODIUM SUCCINATE SCH MG: 40 INJECTION, POWDER, FOR SOLUTION INTRAMUSCULAR; INTRAVENOUS at 14:27

## 2020-01-24 NOTE — ECHO
*[facility]*

[place] [address]

Phone: [phone]

Fax #: [phone]



-------------------------------------------------------------------

Transthoracic Echocardiogram



Patient: Jyoti Gage                   MRN:        [id number]

:     1945                         Study Date: 2020

Age:     74                                 Accession#: [id number]

Gender:  F                                  HR:

Height:  66 in /167.6 cm                    BSA:        2.1 m^2

Weight:  224.5 lb /102.1 kg                 BMI:        36.3 kg/m^2



*Sonographer: * Gladys Naik [facility]

 

*Referring Physician: * Ese Arriaza

*Reading Physician: * Maghaydah, Qutaybeh MD



-------------------------------------------------------------------

Indications:   Edema.



-------------------------------------------------------------------

History:  PICC line.  Coronary artery disease.  Hypertrophic

cardiomyopathy. Outflow obstruction is present.  Chronic

obstructive pulmonary disease.  Risk factors:   Former tobacco use.

Hypertension. Obese.



-------------------------------------------------------------------

Conclusions



Summary:



- Left ventricle: The cavity size is normal. Wall thickness is

  moderately increased. Systolic function is normal. The estimated

  ejection fraction is 55-60%. The outflow tract shows presence of

  obstruction and a velocity flow profile with a late systolic

  peak, typical of dynamic obstruction. The peak velocity at the

  obstruction is 4.7 m/s.

- Mitral valve: There is systolic anterior motion of the anterior

  leaflet and chordal structures. There is mild regurgitation.

- Aortic valve: There is trace regurgitation.

- Tricuspid valve: There is mild-moderate regurgitation.

- Ascending aorta: The ascending aorta is mildly dilated.

- Pulmonary arteries: Systolic pressure is moderately increased.

- No previous echocardiogram available.



-------------------------------------------------------------------

Study data:  Transthoracic echocardiogram.  Procedure:

Transthoracic echocardiography was performed. Image quality was

fair. The study was technically limited due to body habitus.

Complete 2D, spectral Doppler, and color flow Doppler.  Location:

ICU  Patient status:  Inpatient. Patient room number: ICU-07.

Rhythm:  Tachycardia.



-------------------------------------------------------------------

Findings



Left ventricle:  The cavity size is normal. Wall thickness is

moderately increased. There is a prominent septal knuckle measuring

1.9 cm. Systolic function is normal. The estimated ejection

fraction is 55-60%. The outflow tract shows presence of obstruction

and a velocity flow profile with a late systolic peak, typical of

dynamic obstruction. The peak velocity at the obstruction is 4.7

m/s.  Regional wall motion abnormalities:   Hypokinesis of the

apicalanteroseptal, anterolateral, inferior, and inferoseptal

myocardium. There is no consistent Doppler evidence of clinically

significant diastolic dysfunction.

Right ventricle:  The cavity size is mildly to moderately dilated.

Systolic function is mildly reduced.

Ventricular septum:   There is septal flattening of the

interventricular septum consistent with RV volume or pressure

overload.

Left atrium:  The atrium is severely dilated.

Right atrium:  The atrium is mildly dilated.

Mitral valve:   The Mitral valve annulus appears calcified. The

leaflets are moderately thickened. There is systolic anterior

motion of the anterior leaflet and chordal structures.  The

findings are consistent with mild stenosis.   There is mild

regurgitation.

Aortic valve:   The valve is trileaflet. The leaflets are mildly

thickened.  Moderate focal thickening involving the noncoronary

cusp.  There is no evidence of stenosis.   There is trace

regurgitation.

Tricuspid valve:  The leaflets are normal thickness.  There is no

evidence of stenosis.   There is mild-moderate regurgitation.

Pulmonic valve:   The leaflets are normal thickness.  There is no

evidence of stenosis.   There is trace regurgitation.

Aorta:  Aortic root: The aortic root is appears normal.

Ascending aorta: The ascending aorta is mildly dilated.

Aortic arch: The aortic arch is appears normal.

Pericardium:  A prominent pericardial fat pad is present. There is

no significant pericardial effusion.

Pulmonary arteries:

The main pulmonary artery is normal-sized. Systolic pressure is

moderately increased.

Systemic veins:

Inferior vena cava: The vessel is normal in size. There is (&gt;= 50%)

respiratory change in the IVC dimension.



-------------------------------------------------------------------

Measurements



 Left ventricle            Value        Ref         Aortic valve continued      Value       Ref

 MAYKEL, LAX                  4.6   cm     3.8 - 5.2   Peak v, S                   3.51  m/sec -----

 ESD, LAX                  2.9   cm     2.2 - 3.5   VTI, S                      91.7  cm    -----

 FS, LAX                   38    %       45     Mean grad, S                33.0  mm Hg -----

 PW, ED, LAX       (H)     1.5   cm     0.6 - 0.9   Peak grad, S                49.0  mm Hg -----

 FS                        38    %       - 45     LVOT/AV, VTI ratio          0.35        -----

 PW, ED            (H)     1.5   cm     0.6 - 0.9

 E&apos;, lat oskar, TDI  (L)     8.9   cm/sec &gt;=10.0      Mitral valve                Value       R
ef

 E/e&apos;, lat oskar,            17           ----------  Peak E                      1.53  m/sec ----
-

 TDI                                                Peak A                      1.38  m/sec -----

 E&apos;, med oskar, TDI          12.1  cm/sec &gt;=7.0       Decel time                  176   ms    -
----

 E/e&apos;, med oskar,            13           ----------  PHT                         99    ms    ----
-

 TDI                                                Mean grad, D                5.0   mm Hg -----

 E&apos;, avg, TDI              10.5  cm/sec ----------  Peak grad, D                12.0  mm Hg ----
-

 E/e&apos;, avg, TDI    (H)     15           &lt;=14        Peak E/A ratio              1.1         -
----

                                                    MVA, PHT                    2.2   cm^2  -----

 LVOT                      Value        Ref

 Peak fausto, S               1.76  m/sec  ----------  Pulmonic valve              Value       Ref

 VTI, S                    32.0  cm     ----------  Peak v, S                   1.06  m/sec -----

 Peak grad, S              12    mm Hg  ----------  Peak grad, S                4.0   mm Hg -----

 Mean grad, S              6     mm Hg  ----------

                                                    Tricuspid valve             Value       Ref

 Ventricular septum        Value        Ref         TR peak v          (H)      3.4   m/sec &lt;=2.8

 IVS, ED           (H)     1.9   cm     0.6 - 0.9   Peak RV-RA grad, S          46    mm Hg -----

 

 Right ventricle           Value        Ref         Aortic root                 Value       Ref

 MAYKEL, LAX                  3.8   cm     ----------  Root diam                   3.6   cm    &lt;4.2

 MAYKEL minor ax, A4C (H)     4.2   cm     1.9 - 3.5

 mid                                                Ascending aorta             Value       Ref

 Pressure, S               49    mm Hg  ----------  AAo AP diam, S              3.8   cm    -----

 

 Left atrium               Value        Ref         Aortic arch                 Value       Ref

 AP dim, ES        (H)     5.50  cm     2.70 -      Arch diam                   2.2   cm    -----

                                        3.80

 ML dim, A4C               4.5   cm     ----------  Decending aorta             Value       Ref

 SI dim, A4C               6.0   cm     ----------  Kinga peak fausto                1.19  m/sec -----

 Vol/bsa, ES, 1-p  (H)     42    ml/m^2 11 - 40

 A4C                                                Pulmonary artery            Value       Ref

 Vol/bsa, ES, A/L  (H)     49    ml/m^2 16 - 34     Pressure, S                 45.0  mm Hg -----

 

 Right atrium              Value        Ref         Inferior vena cava          Value       Ref

 SI dim, ES        (H)     5.9   cm     3.4 - 5.3   Diam                        2.0   cm    -----

 ML dim, ES, A4C           4.3   cm     2.6 - 4.4

 Estimated RAP             3     mm Hg  ----------

 

 Aortic valve              Value        Ref

 Oskar diam, ED              2.3   cm     ----------

 

Legend:

(L)  and  (H)  christian values outside specified reference range.



Prepared and electronically signed by



Maghaydah, Qutaybeh MD

2020 17:22

## 2020-01-24 NOTE — PN
Date of Service: 20


Critical Care Services: 





Patient seen and examined. Overnight nursing notes reviewed. Patient apparently 

refused CPT vest overnight and was cursing at staff. She denies acute SOB, 

states her pain is still "all over" but does not have any specific complaints 

other than being tired.








Vital Signs: 











Temp Pulse Resp BP SpO2 FiO2


 


98.8 F 106 21 130/81 91 80


 


20 16:00 20 18:00 20 18:00 20 18:00 20 18:00  17:11











Physical Exam: 


Gen: Alert, but sleepy





HEENT: Perr[place], atraumatic, normocephalic





Lungs: diminished R>L with course rhonchi throughout lung fields





Cardiac:  tachy low 100s, No murmurs or rubs, regular





Abdomen: benign, +BS, low output on colostomy, no diarrhea





Extremities: no clubbing cyanosis or edema, joint pain over knees





Neuro: A&O x3, no focal deficits











Fluid Balance (Past 24 Hours): 





 Intake & Output











 20





 06:59 06:59 06:59 06:59


 


Intake Total 2893 3929.7 1692 599


 


Output Total  1800


 


Balance 2393 3629.7 517 -1201


 


Weight 223 lb 1.725 oz  225 lb 0.204 oz 


 


Intake:    


 


  IV Fluids 2306 1481 492 199


 


    ABX  341  


 


     663 164 


 


    NS 1492 477 328 199


 


  IVPB 263  200 


 


      200 


 


  Medicated  201.7  


 


    Levophed 324 201.7  


 


  Oral  1660 1000 400


 


  Packed Cells  587  


 


Output:    


 


  Urine  1600


 


  [place]    200


 


Other:    


 


  Estimated Void Small Large Large 


 


  # Bowel Movements   1 


 


  Estimated Stool Amount   Small 


 


  # Voids 1 1  








Labs: 


 Laboratory Results - last 24 hr











  20





  05:37


 


WBC  15.1 H


 


RBC  3.67 L


 


Hgb  7.4 L


 


Hct  25 L


 


MCV  69 L


 


MCH  20 L


 


MCHC  30 L


 


RDW  25 H


 


Plt Count  317


 


MPV  7.8


 


Neut % (Auto)  95.6


 


Lymph % (Auto)  2.0


 


Mono % (Auto)  2.3


 


Eos % (Auto)  0.0


 


Baso % (Auto)  0.1


 


Absolute Neuts (auto)  14.4 H


 


Absolute Lymphs (auto)  0.3 L


 


Absolute Monos (auto)  0.3


 


Absolute Eos (auto)  0.0


 


Absolute Basos (auto)  0.0


 


Absolute Nucleated RBC  0.0


 


Nucleated RBC %  0.1











Studies: 


*[facility]*


[place] [address]


Phone: [phone]


Fax #: [phone]


-------------------------------------------------------------------


Transthoracic Echocardiogram


Patient: Jyoti Gage                   MRN:        [id number]


:     1945                         Study Date: 2020


Age:     74                                 Accession#: [id number]


Gender:  F                                  HR:


Height:  66 in /167.6 cm                    BSA:        2.1 m^2


Weight:  224.5 lb /102.1 kg                 BMI:        36.3 kg/m^2


*Sonographer: * Gladys Naik [facility]


*Referring Physician: * Ese Arriaza


*Reading Physician: * Maghaydah, Qutaybeh MD


-------------------------------------------------------------------


Indications:   Edema.


-------------------------------------------------------------------


History:  PICC line.  Coronary artery disease.  Hypertrophic


cardiomyopathy. Outflow obstruction is present.  Chronic


obstructive pulmonary disease.  Risk factors:   Former tobacco use.


Hypertension. Obese.


-------------------------------------------------------------------


Conclusions


Summary:


- Left ventricle: The cavity size is normal. Wall thickness is


  moderately increased. Systolic function is normal. The estimated


  ejection fraction is 55-60%. The outflow tract shows presence of


  obstruction and a velocity flow profile with a late systolic


  peak, typical of dynamic obstruction. The peak velocity at the


  obstruction is 4.7 m/s.


- Mitral valve: There is systolic anterior motion of the anterior


  leaflet and chordal structures. There is mild regurgitation.


- Aortic valve: There is trace regurgitation.


- Tricuspid valve: There is mild-moderate regurgitation.


- Ascending aorta: The ascending aorta is mildly dilated.


- Pulmonary arteries: Systolic pressure is moderately increased.


- No previous echocardiogram available.





This report is only to be considered final once signed by the Provider(s) as 

displayed in the "<Electronically Signed by >" field (s). Absence of a 


signature indicates the report is in a draft status and still needs to be 

finalized. In the event this document was created by someone other than the 


signing Provider, the individual initiating the document will be listed in the 

"Entered by:" or "Dictated by:" fields.











Patient Name:          JYOTI GAGE                                         

                              Medical Record#: [id number]


Ordering Physician: Marilou Levine MD                                          

                              Acct.#: [id number]


:     1945           Age: 74   Sex: F                                 

                              Location: INTENSIVE CARE UNIT


Exam Date: 20                                                       

                              ADM Status: ADM IN


Order Information:                            CHEST AP/PORT


Accession Number:                             [id number]


CPT:                                          [id number]


INDICATION:  Shortness of breath.





COMPARISON:  Comparison is made with prior chest x-ray studies from 2019 and


2020. Correlation is also made with a CT of the chest from 2020.





TECHNIQUE: 2 portable films of the chest were obtained.





FINDINGS: The heart appears within normal limits in size. There is a PICC 

present entering


on the right side. The catheter tip projects over the right atrium.





There is a dense infiltrate present throughout the right lung which has 

progressed from


the prior study. There is also a patchy infiltrate in the left midlung which is 

new.  





IMPRESSION:  BILATERAL INFILTRATES WITH INTERVAL PROGRESSION.








____________________________________________________________





Patient Name:          JYOTI GAGE                                         

                              Medical Record#: [id number]


Ordering Physician: Lauro Ravi MD                                      

                              Acct.#: [id number]


:     1945           Age: 74   Sex: F                                 

                              Location: EMERGENCY DEPARTMENT


Exam Date: 20 1506                                                       

                              ADM Status: REG ER


Order Information:                            CTA CHEST/ABD/PEL


Accession Number:                             [id number]


CPT:                                          [id number]


HISTORY: cp, hypoxia





COMPARISONS: 2019, chest radiograph dated 2019





TECHNIQUE: Multiple contiguous axial CT scans were obtained of the chest, 

abdomen, and


pelvis after the administration of intravenous contrast. Coronal and sagittal 

multiplanar


reformations are submitted for review.. 3-D volumetric reconstructions of the 

aorta are


also submitted for review.








FINDINGS:





CHEST





NECK AND THYROID: The lower neck and thyroid are unremarkable.


CHEST WALL: There is no lower cervical, axillary, or supraclavicular 

lymphadenopathy by


size criteria.





HEART AND PERICARDIUM: The heart is unremarkable.


AORTA AND PULMONARY VASCULATURE: The aorta is tortuous. There is mild 

atherosclerosis of


the thoracic aorta. There is no pulmonary arterial filling defect to suggest 

pulmonary


embolism. There is no thoracic aortic aneurysm. There is no intraluminal flap 

to suggest


dissection.





MEDIASTINUM: There are subcentimeter short axis mediastinal lymph nodes. There 

is no


mediastinal lymphadenopathy by size criteria.


EDD: There is no hilar lymphadenopathy by size criteria.





AIRWAY AND ESOPHAGUS: The airway is unremarkable, without endobronchial filling 

defect.


The esophagus is grossly normal.


LUNG PARENCHYMA: There is a diffuse pattern of groundglass opacification 

consolidation


throughout the right lung and to lesser extent of the left lung base. There is 

subpleural


interlobular septal thickening and honeycombing most pronounced in the left 

lower lung,


similar to the previous CT examination


PLEURA: No pleural abnormalities are noted.





BONES AND SOFT TISSUES: Degenerative changes are noted.





ABDOMEN/PELVIS:





LIVER: The liver is normal in shape, size, contour, and attenuation.


BILE DUCTS: There is no intrahepatic or extrahepatic biliary dilatation.


GALLBLADDER: The gallbladder is not visualized. Surgical clips are noted in the


gallbladder fossa.





PANCREAS: The pancreas is normal, without mass or ductal dilatation.


SPLEEN: Normal in size and appearance.





UPPER GI TRACT: Evaluation of the gastrointestinal tract is limited by 

incomplete gastric


distention. The upper GI tract is unremarkable.


SMALL BOWEL & MESENTERY:  The small bowel is normal in contour, course, and 

caliber.


[facility] IMAGING


Patient Name:JYOTI GAGE                                                MR:

[id number]                                      : 1945











There is no obstruction or dilatation.


COLON:   A colostomy is noted. There is a parastomal fat-containing hernia. 

There are


diverticula of the descending colon and the rectosigmoid stump. There is a 

rectocele.





ADRENALS: Normal bilaterally.


KIDNEYS: The kidneys are normal in shape, size, contour, and axis. There is no


hydronephrosis or nephrolithiasis.


BLADDER: There is a cystocele.





PELVIC ORGANS: The pelvic organs are not visualized.





AORTA: There is aneurysmal dilatation of the infrarenal abdominal aorta. This 

measures


approximately 4.8 x 4.7 cm in transverse orthogonal dimension, somewhat 

increased from 4.2


x 4.5 cm when measured at comparable levels on the previous CT examination. 

There is


atherosclerosis of the aorta and its branches.


IVC: Unremarkable





LYMPH NODES: There is no lymphadenopathy by size criteria.





ABDOMINAL WALL: There is no evidence for abdominal wall hernia.


BONES AND SOFT TISSUES: There are mild diffuse degenerative changes.


OTHER: None





IMPRESSION:


1.  NO PULMONARY ARTERIAL FILLING DEFECT TO SUGGEST PULMONARY EMBOLISM.


2.  NO AORTIC INTIMAL FLAP TO SUGGEST DISSECTION.


3.  EXTENSIVE AIRSPACE DISEASE THROUGHOUT THE RIGHT LUNG AND IN THE LEFT LUNG 

BASE.


4.  AGAIN NOTED ARE MILD FIBROTIC CHANGES.


5.  INFRARENAL ABDOMINAL AORTIC ANEURYSM MEASURING UP TO 4.5 CM, SLIGHTLY 

INCREASED IN


SIZE FROM 2019.


6.  STATUS POST COLOSTOMY.


7.  DIVERTICULOSIS


8.  RECTOCYSTOCELE.


9.  ATHEROSCLEROSIS..








____________________________________________________________








Nutrition: 








Regular diet











Impression: 








Mrs Gage is a 75yo F with PMH of rheumatoid arthritis, chronic pain, CAD, 

HTN, hypothyroidism, who presented to ED with dyspnea, initially diagnosed on 1/

3/2020 with PNA, found to now have severe sepsis secondary to CAP, failed 

outpatient treatment.





Diagnoses:





1. Bilateral Klebsiella Pneumonia/CAP


2. Acute Hypoxic Respiratory Failure


3. Acute on Chronic Anemia, AOCD with Anemia 2/2 Critical Illness


4. Severe Sepsis 2/2 CAP, resolved


5. Hx of Chronic pain 2/2 RA


6. Hx of COPD


7. Hx of Hypertrophic Cardiomyopathy











Plan: 





Neuro


 - Alert, mentating appropriately


 - Supportive care





CV


 - Sinus tach in setting of sepsis/PNA


 - Off pressors since 


 - Continue tele


 - Mild troponinemia in setting of demand ischemia, no chest pain at present, 

however, had chest pain in ED which is resolved. No EKG changes noted and per 

report, no response to NTG. Seems chest pain was r/t pulmonary source and is 

now improved


 - May benefit from ischemic workup after acute illness is resolved


 - ECHO completed today, shows preserved EF, confirms hypertrophic 

cardiomyopathy





Pulm


 - Did Not tolerate chest PT vest or metaneb, cursed at staff overnight and 

through RRT out of her room


 - CXR with interval progression of large right sided infiltrate, however, 

patient does appear to be clinically stable on vapotherm


 - Continue aztreonam based on sputum cx, levaquin DCd yesterday


 - Continue pulmonary toilet as tolerated


 - Continue IV solumedrol Q8h


 - Continue to wean vapotherm as tolerated, although some component of her 

presentation is likely also 2/2 underlying interstitial lung disease





GI


 - Regular diet


 - Colostomy care





Renal


 - Renal function normal with appropriate UOP, showing a negative fluid balance 

today


 - ECHO with no failure, continue to monitor I&O





Musculoskeletal/Skin


 - OOB to chair when she can tolerate, will try again tomorrow although patient 

seems unmotivated


 - Continue home pain meds for RA/chronic pain, limit for sedation


 - PT/OT





Endocrine


 - No issues, continue home dose levothyroxine





ID


 - Positive klebsiella in sputum


 - Repeat CXR with worsening bilateral infiltrates R>L


 - Legionella and strep pneumo antigens are negative


 - Follow blood cx NTD, remains afebrile


 - Continue aztreonam, appropriate based on sensitivities


 - Allergy to PCN


 - WBC count continues to improve


 - Lactic acid 2.2, however, patient is afebrile and hemodynamically stable, 

acidosis is likely 2/2 respiratory failure and tissue ischemia in combination 

with beta agonist inhalation 2/2 q4h lonnie





HEME


 - Iron studies reviewed, appears to be AOCD, rather than iron deficient and 

losses noted in setting of sepsis and acute illness


 - 1 unit PRBCs transfused 2020; H&H 7. today, continue to follow, may 

need additional transfusion





Psychosocial


 - Anxiolytics PRN





DVT Prophy - HSQ Q8h





Lines: Right UE PICC inserted 2020





Code Status: DNR/DNI, ok for pressors and non-invasive respiratory intervention





Critical Care Time: 50 minutes

## 2020-01-25 LAB
ANION GAP SERPL CALC-SCNC: 3 MMOL/L (ref 2–11)
BASOPHILS # BLD AUTO: 0 10^3/UL (ref 0–0.2)
BUN SERPL-MCNC: 25 MG/DL (ref 6–24)
BUN/CREAT SERPL: 42.4 (ref 8–20)
CALCIUM SERPL-MCNC: 8.3 MG/DL (ref 8.6–10.3)
CHLORIDE SERPL-SCNC: 105 MMOL/L (ref 101–111)
EOSINOPHIL # BLD AUTO: 0 10^3/UL (ref 0–0.6)
GLUCOSE SERPL-MCNC: 142 MG/DL (ref 70–100)
HCO3 SERPL-SCNC: 30 MMOL/L (ref 22–32)
HCT VFR BLD AUTO: 26 % (ref 35–47)
HGB BLD-MCNC: 7.9 G/DL (ref 12–16)
LYMPHOCYTES # BLD AUTO: 0.4 10^3/UL (ref 1–4.8)
MCH RBC QN AUTO: 21 PG (ref 27–31)
MCHC RBC AUTO-ENTMCNC: 30 G/DL (ref 31–36)
MCV RBC AUTO: 68 FL (ref 80–97)
MONOCYTES # BLD AUTO: 0.4 10^3/UL (ref 0–0.8)
NEUTROPHILS # BLD AUTO: 9.6 10^3/UL (ref 1.5–7.7)
NRBC # BLD AUTO: 0 10^3/UL
NRBC BLD QL AUTO: 0.1
PLATELET # BLD AUTO: 334 10^3/UL (ref 150–450)
POTASSIUM SERPL-SCNC: 3.9 MMOL/L (ref 3.5–5)
RBC # BLD AUTO: 3.79 10^6 /UL (ref 3.7–4.87)
SODIUM SERPL-SCNC: 138 MMOL/L (ref 135–145)
WBC # BLD AUTO: 10.3 10^3/UL (ref 3.5–10.8)

## 2020-01-25 RX ADMIN — AZTREONAM SCH MLS/HR: 2 INJECTION, POWDER, FOR SOLUTION INTRAMUSCULAR; INTRAVENOUS at 03:12

## 2020-01-25 RX ADMIN — FOLIC ACID SCH MG: 1 TABLET ORAL at 08:58

## 2020-01-25 RX ADMIN — ACETAMINOPHEN AND CODEINE PHOSPHATE PRN TAB: 300; 30 TABLET ORAL at 14:11

## 2020-01-25 RX ADMIN — HEPARIN SODIUM SCH UNITS: 5000 INJECTION INTRAVENOUS; SUBCUTANEOUS at 21:05

## 2020-01-25 RX ADMIN — LEVOTHYROXINE SODIUM SCH MCG: 112 TABLET ORAL at 05:19

## 2020-01-25 RX ADMIN — AZTREONAM SCH MLS/HR: 2 INJECTION, POWDER, FOR SOLUTION INTRAMUSCULAR; INTRAVENOUS at 11:53

## 2020-01-25 RX ADMIN — AZTREONAM SCH MLS/HR: 2 INJECTION, POWDER, FOR SOLUTION INTRAMUSCULAR; INTRAVENOUS at 21:09

## 2020-01-25 RX ADMIN — METHYLPREDNISOLONE SODIUM SUCCINATE SCH MG: 40 INJECTION, POWDER, FOR SOLUTION INTRAMUSCULAR; INTRAVENOUS at 05:05

## 2020-01-25 RX ADMIN — METHYLPREDNISOLONE SODIUM SUCCINATE SCH MG: 40 INJECTION, POWDER, FOR SOLUTION INTRAMUSCULAR; INTRAVENOUS at 14:10

## 2020-01-25 RX ADMIN — HEPARIN SODIUM SCH UNITS: 5000 INJECTION INTRAVENOUS; SUBCUTANEOUS at 05:06

## 2020-01-25 RX ADMIN — FENTANYL CITRATE PRN MCG: 0.05 INJECTION, SOLUTION INTRAMUSCULAR; INTRAVENOUS at 22:36

## 2020-01-25 RX ADMIN — GABAPENTIN SCH MG: 300 CAPSULE ORAL at 21:06

## 2020-01-25 RX ADMIN — CYCLOBENZAPRINE HYDROCHLORIDE PRN MG: 10 TABLET, FILM COATED ORAL at 21:05

## 2020-01-25 RX ADMIN — METHYLPREDNISOLONE SODIUM SUCCINATE SCH MG: 40 INJECTION, POWDER, FOR SOLUTION INTRAMUSCULAR; INTRAVENOUS at 21:04

## 2020-01-25 RX ADMIN — ACETAMINOPHEN AND CODEINE PHOSPHATE PRN TAB: 300; 30 TABLET ORAL at 05:19

## 2020-01-25 RX ADMIN — GABAPENTIN SCH MG: 300 CAPSULE ORAL at 08:58

## 2020-01-25 RX ADMIN — HEPARIN SODIUM SCH UNITS: 5000 INJECTION INTRAVENOUS; SUBCUTANEOUS at 14:11

## 2020-01-25 RX ADMIN — DULOXETINE HYDROCHLORIDE SCH MG: 20 CAPSULE, DELAYED RELEASE ORAL at 08:58

## 2020-01-25 RX ADMIN — Medication SCH ML: at 21:04

## 2020-01-25 RX ADMIN — GABAPENTIN SCH MG: 300 CAPSULE ORAL at 14:11

## 2020-01-25 RX ADMIN — Medication SCH ML: at 05:07

## 2020-01-25 NOTE — PN
Date of Service: 20


Critical Care Services: 





Patient seen and examined, no acute overnight events. Sats 98% on vapotherm, no 

chest pain and no acute SOB. Remains afebrile but mildly tachycardic with no 

ectopy. No further complaints.








Vital Signs: 











Temp Pulse Resp BP SpO2 FiO2


 


97.9 F 95 17 159/91 96 75


 


20 11:48 20 17:00 20 17:00 20 17:00 20 17:00  08:00











Physical Exam: 


Gen: Alert, no acute distress, improved color





HEENT: Atraumatic, normocephalic





Lungs: Improved air entry bilaterally, scattered rhonchi also improved, less 

course, no wheeze





Cardiac:  





Abdomen: +S1S2, tachy, regular, no murmurs





Extremities: no clubbing or cyanosis, no edema, dressing to right knee CDI, 

generalized pain with palpation over large joints





Neuro: A&Ox3, no focalities











Fluid Balance (Past 24 Hours): 


I=     O=     Net 


 Intake & Output











 20





 06:59 06:59 06:59 06:59


 


Intake Total 3929.7 1692 2019 976


 


Output Total 300 1175 1800 345


 


Balance 3629.7 517 219 631


 


Weight  225 lb 0.204 oz 227 lb 3.985 oz 


 


Intake:    


 


  IV Fluids 1481 492 199 20


 


       


 


     164  


 


     328 199 20


 


  IVPB  200 210 121


 


    ABX  200 210 


 


    NS    121


 


  Medicated .7   


 


    Levophed 201.7   


 


  Oral 1660 1000 1610 835


 


  Packed Cells 587   


 


Output:    


 


  Urine 300 1175 1600 345


 


  [place]   200 


 


Other:    


 


  Estimated Void Large Large Large 


 


  Date of Last Bowel    2020





  Movement    


 


  # Bowel Movements  1  


 


  Estimated Stool Amount  Small  Medium


 


  # Voids 1  2 








Labs: 


 Laboratory Results - last 24 hr











  20





  05:10 05:10


 


WBC  10.3 


 


RBC  3.79 


 


Hgb  7.9 L 


 


Hct  26 L 


 


MCV  68 L 


 


MCH  21 L 


 


MCHC  30 L 


 


RDW  25 H 


 


Plt Count  334 


 


MPV  7.4 


 


Neut % (Auto)  92.7 


 


Lymph % (Auto)  3.7 


 


Mono % (Auto)  3.5 


 


Eos % (Auto)  0.0 


 


Baso % (Auto)  0.1 


 


Absolute Neuts (auto)  9.6 H 


 


Absolute Lymphs (auto)  0.4 L 


 


Absolute Monos (auto)  0.4 


 


Absolute Eos (auto)  0.0 


 


Absolute Basos (auto)  0.0 


 


Absolute Nucleated RBC  0.0 


 


Nucleated RBC %  0.1 


 


Sodium   138


 


Potassium   3.9


 


Chloride   105


 


Carbon Dioxide   30


 


Anion Gap   3


 


BUN   25 H


 


Creatinine   0.59


 


Est GFR ( Amer)   121.2


 


Est GFR (Non-Af Amer)   100.2


 


BUN/Creatinine Ratio   42.4 H


 


Glucose   142 H


 


Calcium   8.3 L











Studies: 





*[facility]*


[place] [address]


Phone: [phone]


Fax #: [phone]


-------------------------------------------------------------------


Transthoracic Echocardiogram


Patient: Jyoti Gage                   MRN:        [id number]


:     1945                         Study Date: 2020


Age:     74                                 Accession#: [id number]


Gender:  F                                  HR:


Height:  66 in /167.6 cm                    BSA:        2.1 m^2


Weight:  224.5 lb /102.1 kg                 BMI:        36.3 kg/m^2


*Sonographer: * Gladys Naik [facility]


*Referring Physician: * Ese Arriaza


*Reading Physician: * Maghaydah, Qutaybeh MD


-------------------------------------------------------------------


Indications:   Edema.


-------------------------------------------------------------------


History:  PICC line.  Coronary artery disease.  Hypertrophic


cardiomyopathy. Outflow obstruction is present.  Chronic


obstructive pulmonary disease.  Risk factors:   Former tobacco use.


Hypertension. Obese.


-------------------------------------------------------------------


Conclusions


Summary:


- Left ventricle: The cavity size is normal. Wall thickness is


  moderately increased. Systolic function is normal. The estimated


  ejection fraction is 55-60%. The outflow tract shows presence of


  obstruction and a velocity flow profile with a late systolic


  peak, typical of dynamic obstruction. The peak velocity at the


  obstruction is 4.7 m/s.


- Mitral valve: There is systolic anterior motion of the anterior


  leaflet and chordal structures. There is mild regurgitation.


- Aortic valve: There is trace regurgitation.


- Tricuspid valve: There is mild-moderate regurgitation.


- Ascending aorta: The ascending aorta is mildly dilated.


- Pulmonary arteries: Systolic pressure is moderately increased.


- No previous echocardiogram available.





This report is only to be considered final once signed by the Provider(s) as 

displayed in the "<Electronically Signed by >" field (s). Absence of a 


signature indicates the report is in a draft status and still needs to be 

finalized. In the event this document was created by someone other than the 


signing Provider, the individual initiating the document will be listed in the 

"Entered by:" or "Dictated by:" fields.











Patient Name:          JYOTI GAGE                                         

                              Medical Record#: [id number]


Ordering Physician: Marilou Levine MD                                          

                              Acct.#: [id number]


:     1945           Age: 74   Sex: F                                 

                              Location: INTENSIVE CARE UNIT


Exam Date: 20                                                       

                              ADM Status: ADM IN


Order Information:                            CHEST AP/PORT


Accession Number:                             [id number]


CPT:                                          [id number]


INDICATION:  Shortness of breath.





COMPARISON:  Comparison is made with prior chest x-ray studies from 2019 and


2020. Correlation is also made with a CT of the chest from 2020.





TECHNIQUE: 2 portable films of the chest were obtained.





FINDINGS: The heart appears within normal limits in size. There is a PICC 

present entering


on the right side. The catheter tip projects over the right atrium.





There is a dense infiltrate present throughout the right lung which has 

progressed from


the prior study. There is also a patchy infiltrate in the left midlung which is 

new.  





IMPRESSION:  BILATERAL INFILTRATES WITH INTERVAL PROGRESSION.








____________________________________________________________





Patient Name:          JYOTI GAGE                                         

                              Medical Record#: [id number]


Ordering Physician: Lauro Ravi MD                                      

                              Acct.#: [id number]


:     1945           Age: 74   Sex: F                                 

                              Location: EMERGENCY DEPARTMENT


Exam Date: 20 1506                                                       

                              ADM Status: REG ER


Order Information:                            CTA CHEST/ABD/PEL


Accession Number:                             [id number]


CPT:                                          [id number]


HISTORY: cp, hypoxia





COMPARISONS: 2019, chest radiograph dated 2019





TECHNIQUE: Multiple contiguous axial CT scans were obtained of the chest, 

abdomen, and


pelvis after the administration of intravenous contrast. Coronal and sagittal 

multiplanar


reformations are submitted for review.. 3-D volumetric reconstructions of the 

aorta are


also submitted for review.








FINDINGS:





CHEST





NECK AND THYROID: The lower neck and thyroid are unremarkable.


CHEST WALL: There is no lower cervical, axillary, or supraclavicular 

lymphadenopathy by


size criteria.





HEART AND PERICARDIUM: The heart is unremarkable.


AORTA AND PULMONARY VASCULATURE: The aorta is tortuous. There is mild 

atherosclerosis of


the thoracic aorta. There is no pulmonary arterial filling defect to suggest 

pulmonary


embolism. There is no thoracic aortic aneurysm. There is no intraluminal flap 

to suggest


dissection.





MEDIASTINUM: There are subcentimeter short axis mediastinal lymph nodes. There 

is no


mediastinal lymphadenopathy by size criteria.


EDD: There is no hilar lymphadenopathy by size criteria.





AIRWAY AND ESOPHAGUS: The airway is unremarkable, without endobronchial filling 

defect.


The esophagus is grossly normal.


LUNG PARENCHYMA: There is a diffuse pattern of groundglass opacification 

consolidation


throughout the right lung and to lesser extent of the left lung base. There is 

subpleural


interlobular septal thickening and honeycombing most pronounced in the left 

lower lung,


similar to the previous CT examination


PLEURA: No pleural abnormalities are noted.





BONES AND SOFT TISSUES: Degenerative changes are noted.





ABDOMEN/PELVIS:





LIVER: The liver is normal in shape, size, contour, and attenuation.


BILE DUCTS: There is no intrahepatic or extrahepatic biliary dilatation.


GALLBLADDER: The gallbladder is not visualized. Surgical clips are noted in the


gallbladder fossa.





PANCREAS: The pancreas is normal, without mass or ductal dilatation.


SPLEEN: Normal in size and appearance.





UPPER GI TRACT: Evaluation of the gastrointestinal tract is limited by 

incomplete gastric


distention. The upper GI tract is unremarkable.


SMALL BOWEL & MESENTERY:  The small bowel is normal in contour, course, and 

caliber.


[facility] IMAG[facility]


Patient Name:JYOTI GAGE                                                MR:

[id number]                                      : 1945











There is no obstruction or dilatation.


COLON:   A colostomy is noted. There is a parastomal fat-containing hernia. 

There are


diverticula of the descending colon and the rectosigmoid stump. There is a 

rectocele.





ADRENALS: Normal bilaterally.


KIDNEYS: The kidneys are normal in shape, size, contour, and axis. There is no


hydronephrosis or nephrolithiasis.


BLADDER: There is a cystocele.





PELVIC ORGANS: The pelvic organs are not visualized.





AORTA: There is aneurysmal dilatation of the infrarenal abdominal aorta. This 

measures


approximately 4.8 x 4.7 cm in transverse orthogonal dimension, somewhat 

increased from 4.2


x 4.5 cm when measured at comparable levels on the previous CT examination. 

There is


atherosclerosis of the aorta and its branches.


IVC: Unremarkable





LYMPH NODES: There is no lymphadenopathy by size criteria.





ABDOMINAL WALL: There is no evidence for abdominal wall hernia.


BONES AND SOFT TISSUES: There are mild diffuse degenerative changes.


OTHER: None





IMPRESSION:


1.  NO PULMONARY ARTERIAL FILLING DEFECT TO SUGGEST PULMONARY EMBOLISM.


2.  NO AORTIC INTIMAL FLAP TO SUGGEST DISSECTION.


3.  EXTENSIVE AIRSPACE DISEASE THROUGHOUT THE RIGHT LUNG AND IN THE LEFT LUNG 

BASE.


4.  AGAIN NOTED ARE MILD FIBROTIC CHANGES.


5.  INFRARENAL ABDOMINAL AORTIC ANEURYSM MEASURING UP TO 4.5 CM, SLIGHTLY 

INCREASED IN


SIZE FROM 2019.


6.  STATUS POST COLOSTOMY.


7.  DIVERTICULOSIS


8.  RECTOCYSTOCELE.


9.  ATHEROSCLEROSIS..








____________________________________________________________








Nutrition: 





Regular diet








Impression: 





Mrs Gage is a 75yo F with PMH of rheumatoid arthritis, chronic pain, CAD, 

HTN, hypothyroidism, who presented to ED with dyspnea, initially diagnosed on 1/

3/2020 with PNA, found to now have severe sepsis secondary to CAP, failed 

outpatient treatment.





Diagnoses:





1. Bilateral Klebsiella Pneumonia/CAP


2. Acute Hypoxic Respiratory Failure, improving


3. Acute on Chronic Anemia, AOCD with Anemia 2/2 Critical Illness


4. Severe Sepsis 2/2 CAP, resolved


5. Hx of Chronic pain 2/2 RA


6. Hx of COPD


7. Hypertrophic Cardiomyopathy








Plan: 








Neuro


 - Alert, mentating appropriately


 - Supportive care





CV


 - Sinus tach in setting of sepsis/PNA which is improving


 - Off pressors since 


 - Continue tele


 - Mild troponinemia in setting of demand ischemia, no chest pain at present, 

however, had chest pain in ED which is resolved. No EKG changes noted and per 

report, no response to NTG. Seems chest pain was r/t pulmonary source and is 

now 


 - May benefit from ischemic workup after acute illness is resolved


 - ECHO completed 2020, shows preserved EF, confirms hypertrophic 

cardiomyopathy





Pulm


 - Transitioned off vapotherm today, remains on 4L [place]. Desats easily 2/2 

severe deconditioning but no increased WOB


 - CXR on 2020 with interval progression of large right sided infiltrate


 - Continue aztreonam based on sputum cx with klebsiella


 - Continue pulmonary toilet as tolerated; she does NOT like chest PT or metaneb


 - Continue IV solumedrol taper to Q12h tomorrow then transition to oral 

steroids in the next 48 hours





GI


 - Regular diet


 - Colostomy care





Renal


 - Renal function normal with appropriate UOP, slightly fluid positive but 

negligible


 - ECHO with no failure, continue to monitor I&O





Musculoskeletal/Skin


 - Continue OOB to chair and PT/OT


 - Continue home pain meds for RA/chronic pain, limit for sedation, taper off 

IV pain meds





Endocrine


 - No issues, continue home dose levothyroxine





ID


 - Positive klebsiella in sputum with bilateral infiltrates


 - Follow blood cx NTD, remains afebrile


 - Continue aztreonam day 4, appropriate based on sensitivities


 - WBC count is normal


 - Lactic acid 2.2, however, patient is afebrile and hemodynamically stable, 

acidosis is likely 2/2 respiratory failure and tissue ischemia in combination 

with beta agonist inhalation 2/2 q4h duonebs





HEME


 - Iron studies reviewed, appears to be AOCD, rather than iron deficient and 

losses noted in setting of sepsis and acute illness


 - 1 unit PRBCs transfused 2020; H&H 7.9 today, continue to follow, 

asymptomatic





Psychosocial


 - Anxiolytics PRN





DVT Prophy - HSQ Q8h





Lines: Right UE PICC inserted 2020





Code Status: DNR/DNI, ok for pressors and non-invasive respiratory intervention





Critical Care Time: 40 minutes

## 2020-01-26 LAB
ANION GAP SERPL CALC-SCNC: 5 MMOL/L (ref 2–11)
BASOPHILS # BLD AUTO: 0 10^3/UL (ref 0–0.2)
BUN SERPL-MCNC: 24 MG/DL (ref 6–24)
BUN/CREAT SERPL: 45.3 (ref 8–20)
CALCIUM SERPL-MCNC: 8.3 MG/DL (ref 8.6–10.3)
CHLORIDE SERPL-SCNC: 106 MMOL/L (ref 101–111)
EOSINOPHIL # BLD AUTO: 0 10^3/UL (ref 0–0.6)
GLUCOSE SERPL-MCNC: 119 MG/DL (ref 70–100)
HCO3 SERPL-SCNC: 30 MMOL/L (ref 22–32)
HCT VFR BLD AUTO: 27 % (ref 35–47)
HGB BLD-MCNC: 8 G/DL (ref 12–16)
LYMPHOCYTES # BLD AUTO: 0.5 10^3/UL (ref 1–4.8)
MCH RBC QN AUTO: 21 PG (ref 27–31)
MCHC RBC AUTO-ENTMCNC: 30 G/DL (ref 31–36)
MCV RBC AUTO: 69 FL (ref 80–97)
MICROCYTES BLD QL SMEAR: (no result)
MONOCYTES # BLD AUTO: 0.6 10^3/UL (ref 0–0.8)
NEUTROPHILS # BLD AUTO: 5.3 10^3/UL (ref 1.5–7.7)
NRBC # BLD AUTO: 0 10^3/UL
NRBC BLD QL AUTO: 0.2
PLATELET # BLD AUTO: 316 10^3/UL (ref 150–450)
POLYCHROMASIA BLD QL SMEAR: (no result)
POTASSIUM SERPL-SCNC: 4 MMOL/L (ref 3.5–5)
RBC # BLD AUTO: 3.87 10^6 /UL (ref 3.7–4.87)
SODIUM SERPL-SCNC: 141 MMOL/L (ref 135–145)
WBC # BLD AUTO: 6.5 10^3/UL (ref 3.5–10.8)

## 2020-01-26 RX ADMIN — ACETAMINOPHEN AND CODEINE PHOSPHATE PRN TAB: 300; 30 TABLET ORAL at 02:20

## 2020-01-26 RX ADMIN — AZTREONAM SCH MLS/HR: 2 INJECTION, POWDER, FOR SOLUTION INTRAMUSCULAR; INTRAVENOUS at 11:34

## 2020-01-26 RX ADMIN — DULOXETINE HYDROCHLORIDE SCH MG: 20 CAPSULE, DELAYED RELEASE ORAL at 07:55

## 2020-01-26 RX ADMIN — Medication SCH ML: at 05:30

## 2020-01-26 RX ADMIN — AZTREONAM SCH MLS/HR: 2 INJECTION, POWDER, FOR SOLUTION INTRAMUSCULAR; INTRAVENOUS at 02:21

## 2020-01-26 RX ADMIN — VERAPAMIL HYDROCHLORIDE SCH MG: 180 CAPSULE, EXTENDED RELEASE ORAL at 08:48

## 2020-01-26 RX ADMIN — GABAPENTIN SCH MG: 300 CAPSULE ORAL at 20:03

## 2020-01-26 RX ADMIN — METHYLPREDNISOLONE SODIUM SUCCINATE SCH MG: 40 INJECTION, POWDER, FOR SOLUTION INTRAMUSCULAR; INTRAVENOUS at 18:16

## 2020-01-26 RX ADMIN — ACETAMINOPHEN AND CODEINE PHOSPHATE PRN TAB: 300; 30 TABLET ORAL at 15:20

## 2020-01-26 RX ADMIN — HEPARIN SODIUM SCH UNITS: 5000 INJECTION INTRAVENOUS; SUBCUTANEOUS at 05:30

## 2020-01-26 RX ADMIN — ACETAMINOPHEN PRN MG: 325 TABLET ORAL at 18:15

## 2020-01-26 RX ADMIN — FOLIC ACID SCH MG: 1 TABLET ORAL at 07:55

## 2020-01-26 RX ADMIN — METOPROLOL TARTRATE SCH MG: 50 TABLET, FILM COATED ORAL at 07:55

## 2020-01-26 RX ADMIN — CYCLOBENZAPRINE HYDROCHLORIDE PRN MG: 10 TABLET, FILM COATED ORAL at 20:01

## 2020-01-26 RX ADMIN — Medication SCH: at 18:48

## 2020-01-26 RX ADMIN — CYCLOBENZAPRINE HYDROCHLORIDE PRN MG: 10 TABLET, FILM COATED ORAL at 08:48

## 2020-01-26 RX ADMIN — LEVOTHYROXINE SODIUM SCH MCG: 112 TABLET ORAL at 05:30

## 2020-01-26 RX ADMIN — ACETAMINOPHEN AND CODEINE PHOSPHATE PRN TAB: 300; 30 TABLET ORAL at 08:48

## 2020-01-26 RX ADMIN — HEPARIN SODIUM SCH UNITS: 5000 INJECTION INTRAVENOUS; SUBCUTANEOUS at 20:03

## 2020-01-26 RX ADMIN — GABAPENTIN SCH MG: 300 CAPSULE ORAL at 07:55

## 2020-01-26 RX ADMIN — HEPARIN SODIUM SCH UNITS: 5000 INJECTION INTRAVENOUS; SUBCUTANEOUS at 14:02

## 2020-01-26 RX ADMIN — CYANOCOBALAMIN TAB 500 MCG SCH MCG: 500 TAB at 08:47

## 2020-01-26 RX ADMIN — METOPROLOL TARTRATE SCH MG: 50 TABLET, FILM COATED ORAL at 20:01

## 2020-01-26 RX ADMIN — ACETAMINOPHEN PRN MG: 325 TABLET ORAL at 07:55

## 2020-01-26 RX ADMIN — GABAPENTIN SCH MG: 300 CAPSULE ORAL at 14:01

## 2020-01-26 RX ADMIN — AZTREONAM SCH MLS/HR: 2 INJECTION, POWDER, FOR SOLUTION INTRAMUSCULAR; INTRAVENOUS at 18:49

## 2020-01-26 RX ADMIN — METHYLPREDNISOLONE SODIUM SUCCINATE SCH MG: 40 INJECTION, POWDER, FOR SOLUTION INTRAMUSCULAR; INTRAVENOUS at 05:30

## 2020-01-26 NOTE — PN
Date of Service: 20


Critical Care Services: 





Patient seen and examined. Complaining of headache. Denies acute SOB and states 

her breathing feels better. No acute overnight events. Afebrile. Discussed pain 

meds and management and ambulation and plan to transfer to the floor.








Vital Signs: 











Temp Pulse Resp BP SpO2 FiO2


 


97.9 F 90 19 193/106 94 75


 


20 07:35 20 08:00 20 08:00 20 08:00 20 08:00  08:00











Physical Exam: 


Gen: Alert, NAD





HEENT: Atraumatic, normocephalic, PERRLA





Lungs: Course, good air entry, bilateral expiratory wheeze





Cardiac:  +s1s2, no murmurs or rubs, regular rate and rhythm





Abdomen: soft, non-tender, +BS, colostomy





Extremities: no clubbing, cyanosis or edema





Neuro: A&Ox3, no focal deficits











Fluid Balance (Past 24 Hours): 





 Intake & Output











 20





 06:59 06:59 06:59 06:59


 


Intake Total 1692 2019 1416 


 


Output Total 1175 1800 1205 525


 


Balance 517 219 211 -525


 


Weight 225 lb 0.204 oz 227 lb 3.985 oz 228 lb 1 oz 


 


Intake:    


 


  IV Fluids 492 199 20 


 


       


 


     199 20 


 


  IVPB 200 210 321 


 


     210 200 


 


    NS   121 


 


  Oral 1000 1610 1075 


 


Output:    


 


  Urine 1175 1600 1205 525


 


  [place]  200  


 


Other:    


 


  Estimated Void Large Large Large 


 


  Date of Last Bowel   2020 





  Movement    


 


  # Bowel Movements 1   


 


  Estimated Stool Amount Small  Medium 


 


  # Voids  2  








Labs: 


 Laboratory Results - last 24 hr











  20





  04:40 04:40


 


WBC   6.5


 


RBC   3.87


 


Hgb   8.0 L


 


Hct   27 L


 


MCV   69 L


 


MCH   21 L


 


MCHC   30 L


 


RDW   24 H


 


Plt Count   316


 


MPV   7.7


 


Neut % (Auto)   82.3


 


Lymph % (Auto)   8.0


 


Mono % (Auto)   9.6


 


Eos % (Auto)   0.0


 


Baso % (Auto)   0.1


 


Absolute Neuts (auto)   5.3


 


Absolute Lymphs (auto)   0.5 L


 


Absolute Monos (auto)   0.6


 


Absolute Eos (auto)   0.0


 


Absolute Basos (auto)   0.0


 


Absolute Nucleated RBC   0.0


 


Nucleated RBC %   0.2


 


Polychromasia   1+


 


Hypochromasia   1+


 


Basophilic Stippling   1+


 


Anisocytosis   2+


 


Microcytosis   2+


 


Sodium  141 


 


Potassium  4.0 


 


Chloride  106 


 


Carbon Dioxide  30 


 


Anion Gap  5 


 


BUN  24 


 


Creatinine  0.53 


 


Est GFR ( Amer)  137.2 


 


Est GFR (Non-Af Amer)  113.4 


 


BUN/Creatinine Ratio  45.3 H 


 


Glucose  119 H 


 


Calcium  8.3 L 











Studies: 





*[facility]*


[place] [address]


Phone: [phone]


Fax #: [phone]


-------------------------------------------------------------------


Transthoracic Echocardiogram


Patient: Jyoti Gage                   MRN:        [id number]


:     1945                         Study Date: 2020


Age:     74                                 Accession#: [id number]


Gender:  F                                  HR:


Height:  66 in /167.6 cm                    BSA:        2.1 m^2


Weight:  224.5 lb /102.1 kg                 BMI:        36.3 kg/m^2


*Sonographer: * Gladys Naik RDCS


*Referring Physician: * Ese Arriaza


*Reading Physician: * Maghaydah, Qutaybeh MD


-------------------------------------------------------------------


Indications:   Edema.


-------------------------------------------------------------------


History:  PICC line.  Coronary artery disease.  Hypertrophic


cardiomyopathy. Outflow obstruction is present.  Chronic


obstructive pulmonary disease.  Risk factors:   Former tobacco use.


Hypertension. Obese.


-------------------------------------------------------------------


Conclusions


Summary:


- Left ventricle: The cavity size is normal. Wall thickness is


  moderately increased. Systolic function is normal. The estimated


  ejection fraction is 55-60%. The outflow tract shows presence of


  obstruction and a velocity flow profile with a late systolic


  peak, typical of dynamic obstruction. The peak velocity at the


  obstruction is 4.7 m/s.


- Mitral valve: There is systolic anterior motion of the anterior


  leaflet and chordal structures. There is mild regurgitation.


- Aortic valve: There is trace regurgitation.


- Tricuspid valve: There is mild-moderate regurgitation.


- Ascending aorta: The ascending aorta is mildly dilated.


- Pulmonary arteries: Systolic pressure is moderately increased.


- No previous echocardiogram available.





This report is only to be considered final once signed by the Provider(s) as 

displayed in the "<Electronically Signed by >" field (s). Absence of a 


signature indicates the report is in a draft status and still needs to be 

finalized. In the event this document was created by someone other than the 


signing Provider, the individual initiating the document will be listed in the 

"Entered by:" or "Dictated by:" fields.











Patient Name:          JYOTI GAGE                                         

                              Medical Record#: [id number]


Ordering Physician: Marilou Levine MD                                          

                              Acct.#: [id number]


:     1945           Age: 74   Sex: F                                 

                              Location: INTENSIVE CARE UNIT


Exam Date: 20                                                       

                              ADM Status: ADM IN


Order Information:                            CHEST AP/PORT


Accession Number:                             [id number]


CPT:                                          23072


INDICATION:  Shortness of breath.





COMPARISON:  Comparison is made with prior chest x-ray studies from 2019 and


2020. Correlation is also made with a CT of the chest from 2020.





TECHNIQUE: 2 portable films of the chest were obtained.





FINDINGS: The heart appears within normal limits in size. There is a PICC 

present entering


on the right side. The catheter tip projects over the right atrium.





There is a dense infiltrate present throughout the right lung which has 

progressed from


the prior study. There is also a patchy infiltrate in the left midlung which is 

new.  





IMPRESSION:  BILATERAL INFILTRATES WITH INTERVAL PROGRESSION.








____________________________________________________________





Patient Name:          JYOTI GAGE                                         

                              Medical Record#: [id number]


Ordering Physician: Lauro Ravi MD                                      

                              Acct.#: [id number]


:     1945           Age: 74   Sex: F                                 

                              Location: EMERGENCY DEPARTMENT


Exam Date: 20 1506                                                       

                              ADM Status: REG ER


Order Information:                            CTA CHEST/ABD/PEL


Accession Number:                             [id number]


CPT:                                          [id number]


HISTORY: cp, hypoxia





COMPARISONS: 2019, chest radiograph dated 2019





TECHNIQUE: Multiple contiguous axial CT scans were obtained of the chest, 

abdomen, and


pelvis after the administration of intravenous contrast. Coronal and sagittal 

multiplanar


reformations are submitted for review.. 3-D volumetric reconstructions of the 

aorta are


also submitted for review.








FINDINGS:





CHEST





NECK AND THYROID: The lower neck and thyroid are unremarkable.


CHEST WALL: There is no lower cervical, axillary, or supraclavicular 

lymphadenopathy by


size criteria.





HEART AND PERICARDIUM: The heart is unremarkable.


AORTA AND PULMONARY VASCULATURE: The aorta is tortuous. There is mild 

atherosclerosis of


the thoracic aorta. There is no pulmonary arterial filling defect to suggest 

pulmonary


embolism. There is no thoracic aortic aneurysm. There is no intraluminal flap 

to suggest


dissection.





MEDIASTINUM: There are subcentimeter short axis mediastinal lymph nodes. There 

is no


mediastinal lymphadenopathy by size criteria.


EDD: There is no hilar lymphadenopathy by size criteria.





AIRWAY AND ESOPHAGUS: The airway is unremarkable, without endobronchial filling 

defect.


The esophagus is grossly normal.


LUNG PARENCHYMA: There is a diffuse pattern of groundglass opacification 

consolidation


throughout the right lung and to lesser extent of the left lung base. There is 

subpleural


interlobular septal thickening and honeycombing most pronounced in the left 

lower lung,


similar to the previous CT examination


PLEURA: No pleural abnormalities are noted.





BONES AND SOFT TISSUES: Degenerative changes are noted.





ABDOMEN/PELVIS:





LIVER: The liver is normal in shape, size, contour, and attenuation.


BILE DUCTS: There is no intrahepatic or extrahepatic biliary dilatation.


GALLBLADDER: The gallbladder is not visualized. Surgical clips are noted in the


gallbladder fossa.





PANCREAS: The pancreas is normal, without mass or ductal dilatation.


SPLEEN: Normal in size and appearance.





UPPER GI TRACT: Evaluation of the gastrointestinal tract is limited by 

incomplete gastric


distention. The upper GI tract is unremarkable.


SMALL BOWEL & MESENTERY:  The small bowel is normal in contour, course, and 

caliber.


[facility] IMAGING


Patient Name:JYOTI GAGE                                                MR:

[id number]                                      : 1945











There is no obstruction or dilatation.


COLON:   A colostomy is noted. There is a parastomal fat-containing hernia. 

There are


diverticula of the descending colon and the rectosigmoid stump. There is a 

rectocele.





ADRENALS: Normal bilaterally.


KIDNEYS: The kidneys are normal in shape, size, contour, and axis. There is no


hydronephrosis or nephrolithiasis.


BLADDER: There is a cystocele.





PELVIC ORGANS: The pelvic organs are not visualized.





AORTA: There is aneurysmal dilatation of the infrarenal abdominal aorta. This 

measures


approximately 4.8 x 4.7 cm in transverse orthogonal dimension, somewhat 

increased from 4.2


x 4.5 cm when measured at comparable levels on the previous CT examination. 

There is


atherosclerosis of the aorta and its branches.


IVC: Unremarkable





LYMPH NODES: There is no lymphadenopathy by size criteria.





ABDOMINAL WALL: There is no evidence for abdominal wall hernia.


BONES AND SOFT TISSUES: There are mild diffuse degenerative changes.


OTHER: None





IMPRESSION:


1.  NO PULMONARY ARTERIAL FILLING DEFECT TO SUGGEST PULMONARY EMBOLISM.


2.  NO AORTIC INTIMAL FLAP TO SUGGEST DISSECTION.


3.  EXTENSIVE AIRSPACE DISEASE THROUGHOUT THE RIGHT LUNG AND IN THE LEFT LUNG 

BASE.


4.  AGAIN NOTED ARE MILD FIBROTIC CHANGES.


5.  INFRARENAL ABDOMINAL AORTIC ANEURYSM MEASURING UP TO 4.5 CM, SLIGHTLY 

INCREASED IN


SIZE FROM 2019.


6.  STATUS POST COLOSTOMY.


7.  DIVERTICULOSIS


8.  RECTOCYSTOCELE.


9.  ATHEROSCLEROSIS..








____________________________________________________________











Nutrition: 





Regular Diet








Impression: 








Mrs Gage is a 75yo F with PMH of rheumatoid arthritis, chronic pain, CAD, 

HTN, hypothyroidism, who presented to ED with dyspnea, initially diagnosed on 1/

3/2020 with PNA, found to now have severe sepsis secondary to CAP, failed 

outpatient treatment.





Diagnoses:





1. Bilateral Klebsiella Pneumonia/CAP


2. Acute Hypoxic Respiratory Failure, improving, off vapotherm


3. Acute on Chronic Anemia, AOCD with Anemia 2/2 Critical Illness


4. Severe Sepsis 2/2 CAP, resolved


5. Hx of Chronic pain 2/2 RA


6. Hx of COPD


7. Hypertrophic Cardiomyopathy, stable

















Plan: 








Neuro


 - Alert, mentating appropriately


 - Supportive care





CV


 - Tachycardia resolved


 - Off pressors since 


 - Continue tele


 - Mild troponinemia in setting of demand ischemia, no chest pain at present, 

however, had chest pain in ED which is resolved. No EKG changes noted and per 

report, no response to NTG. Seems chest pain was r/t pulmonary source


 - May benefit from ischemic workup after acute illness is resolved


 - ECHO completed 2020, shows preserved EF, confirms hypertrophic 

cardiomyopathy


 - Had elevated BP this morning, restarted verapamil today, BP stable now





Pulm


 - Transitioned off vapotherm 2020, remains on 4L salter, initial 

desaturations but improved overnight and this morning with no desats. 

Maintaining sats >92% on salter this morning


 - CXR on 2020 with interval progression of large right sided infiltrate


 - Continue aztreonam based on sputum cx with klebsiella, recommend transition 

to PO 


 - Continue pulmonary toilet as tolerated; she does NOT like chest PT or metaneb

, starting flutter valve today with duonebs PRN Q4h


 - Continue IV solumedrol, tapered to Q12h today then transition to oral 

steroids in the next 48 hours





GI


 - Regular diet


 - Colostomy care





Renal


 - Renal function normal with appropriate UOP, slightly fluid positive but 

negligible


 - ECHO with no failure, continue to monitor I&O





Musculoskeletal/Skin


 - Continue OOB to chair and PT/OT


 - Continue home pain meds for RA/chronic pain, limit for sedation, tapered off 

IV pain meds, do not recommend restarting any IV pain medication


 - Restart methotrexate and plaquenil at discharge





Endocrine


 - No issues, continue home dose levothyroxine





ID


 - Positive klebsiella in sputum with bilateral infiltrates


 - Follow blood cx NTD, remains afebrile


 - Continue aztreonam day 5, appropriate based on sensitivities, recommend 

transition to levaquin PO for discharge/completion given allergy to PCN and 1st 

gen cephalosporin, other PO options are limited


 - WBC count is normal


 - Lactic acid 2.2, however, patient is afebrile and hemodynamically stable, 

acidosis is likely 2/2 respiratory failure and tissue ischemia in combination 

with beta agonist inhalation 2/2 q4h lonnie





HEME


 - Iron studies reviewed, appears to be AOCD, rather than iron deficient and 

losses noted in setting of sepsis and acute illness


 - 1 unit PRBCs transfused 2020; H&H 8.0 today, continue to follow, 

asymptomatic


 - Started B12 supplementation orally





Psychosocial


 - Anxiolytics PRN





DVT Prophy - HSQ Q8h





Lines: Right UE PICC inserted 2020





Code Status: DNR/DNI, ok for pressors and non-invasive respiratory intervention





Disposition: Medically optimized for downgrade to telemetry floor today.





Critical Care Time: 45 minutes

## 2020-01-27 LAB
ANION GAP SERPL CALC-SCNC: 4 MMOL/L (ref 2–11)
BASOPHILS # BLD AUTO: 0 10^3/UL (ref 0–0.2)
BUN SERPL-MCNC: 18 MG/DL (ref 6–24)
BUN/CREAT SERPL: 38.3 (ref 8–20)
CALCIUM SERPL-MCNC: 8.2 MG/DL (ref 8.6–10.3)
CHLORIDE SERPL-SCNC: 101 MMOL/L (ref 101–111)
EOSINOPHIL # BLD AUTO: 0 10^3/UL (ref 0–0.6)
GLUCOSE SERPL-MCNC: 87 MG/DL (ref 70–100)
HCO3 SERPL-SCNC: 34 MMOL/L (ref 22–32)
HCT VFR BLD AUTO: 27 % (ref 35–47)
HGB BLD-MCNC: 8.3 G/DL (ref 12–16)
LYMPHOCYTES # BLD AUTO: 1.1 10^3/UL (ref 1–4.8)
MCH RBC QN AUTO: 21 PG (ref 27–31)
MCHC RBC AUTO-ENTMCNC: 31 G/DL (ref 31–36)
MCV RBC AUTO: 68 FL (ref 80–97)
MICROCYTES BLD QL SMEAR: (no result)
MONOCYTES # BLD AUTO: 0.9 10^3/UL (ref 0–0.8)
NEUTROPHILS # BLD AUTO: 5.4 10^3/UL (ref 1.5–7.7)
NRBC # BLD AUTO: 0 10^3/UL
NRBC BLD QL AUTO: 0.1
PLATELET # BLD AUTO: 354 10^3/UL (ref 150–450)
POLYCHROMASIA BLD QL SMEAR: (no result)
POTASSIUM SERPL-SCNC: 3.7 MMOL/L (ref 3.5–5)
RBC # BLD AUTO: 4.01 10^6 /UL (ref 3.7–4.87)
SODIUM SERPL-SCNC: 139 MMOL/L (ref 135–145)
WBC # BLD AUTO: 7.5 10^3/UL (ref 3.5–10.8)

## 2020-01-27 RX ADMIN — HEPARIN SODIUM SCH UNITS: 5000 INJECTION INTRAVENOUS; SUBCUTANEOUS at 06:37

## 2020-01-27 RX ADMIN — ACETAMINOPHEN AND CODEINE PHOSPHATE PRN TAB: 300; 30 TABLET ORAL at 21:23

## 2020-01-27 RX ADMIN — Medication SCH ML: at 06:40

## 2020-01-27 RX ADMIN — GABAPENTIN SCH MG: 300 CAPSULE ORAL at 21:22

## 2020-01-27 RX ADMIN — GABAPENTIN SCH MG: 300 CAPSULE ORAL at 13:56

## 2020-01-27 RX ADMIN — CYANOCOBALAMIN TAB 500 MCG SCH MCG: 500 TAB at 07:53

## 2020-01-27 RX ADMIN — ACETAMINOPHEN PRN MG: 325 TABLET ORAL at 00:32

## 2020-01-27 RX ADMIN — ACETAMINOPHEN AND CODEINE PHOSPHATE PRN TAB: 300; 30 TABLET ORAL at 13:56

## 2020-01-27 RX ADMIN — LEVOTHYROXINE SODIUM SCH MCG: 112 TABLET ORAL at 06:42

## 2020-01-27 RX ADMIN — AZTREONAM SCH MLS/HR: 2 INJECTION, POWDER, FOR SOLUTION INTRAMUSCULAR; INTRAVENOUS at 03:29

## 2020-01-27 RX ADMIN — METHYLPREDNISOLONE SODIUM SUCCINATE SCH MG: 40 INJECTION, POWDER, FOR SOLUTION INTRAMUSCULAR; INTRAVENOUS at 06:38

## 2020-01-27 RX ADMIN — METOPROLOL TARTRATE SCH MG: 50 TABLET, FILM COATED ORAL at 21:24

## 2020-01-27 RX ADMIN — CYCLOBENZAPRINE HYDROCHLORIDE PRN MG: 10 TABLET, FILM COATED ORAL at 21:22

## 2020-01-27 RX ADMIN — METOPROLOL TARTRATE SCH MG: 50 TABLET, FILM COATED ORAL at 07:53

## 2020-01-27 RX ADMIN — HEPARIN SODIUM SCH UNITS: 5000 INJECTION INTRAVENOUS; SUBCUTANEOUS at 21:24

## 2020-01-27 RX ADMIN — DULOXETINE HYDROCHLORIDE SCH MG: 20 CAPSULE, DELAYED RELEASE ORAL at 10:25

## 2020-01-27 RX ADMIN — VERAPAMIL HYDROCHLORIDE SCH MG: 180 CAPSULE, EXTENDED RELEASE ORAL at 07:53

## 2020-01-27 RX ADMIN — FOLIC ACID SCH MG: 1 TABLET ORAL at 07:53

## 2020-01-27 RX ADMIN — HEPARIN SODIUM SCH UNITS: 5000 INJECTION INTRAVENOUS; SUBCUTANEOUS at 13:56

## 2020-01-27 RX ADMIN — ACETAMINOPHEN AND CODEINE PHOSPHATE PRN TAB: 300; 30 TABLET ORAL at 06:40

## 2020-01-27 RX ADMIN — ALPRAZOLAM PRN MG: 0.25 TABLET ORAL at 09:41

## 2020-01-27 RX ADMIN — AZTREONAM SCH MLS/HR: 2 INJECTION, POWDER, FOR SOLUTION INTRAMUSCULAR; INTRAVENOUS at 12:09

## 2020-01-27 RX ADMIN — Medication SCH: at 18:58

## 2020-01-27 RX ADMIN — AZTREONAM SCH MLS/HR: 2 INJECTION, POWDER, FOR SOLUTION INTRAMUSCULAR; INTRAVENOUS at 19:05

## 2020-01-27 RX ADMIN — GABAPENTIN SCH MG: 300 CAPSULE ORAL at 07:53

## 2020-01-27 NOTE — PN
Subjective


Date of Service: 01/27/20


Interval History: 





Less SOB today, little cough.  No new c/o.





Objective


Active Medications: 








Acetaminophen (Tylenol Tab*)  650 mg PO Q6H PRN


   PRN Reason: MILD PAIN or TEMP > 100.4


   Last Admin: 01/27/20 00:32 Dose:  650 mg


Acetaminophen/Codeine Phosphate (Tylenol/Codeine 30 Mg Tab*)  2 tab PO Q6H PRN


   PRN Reason: PAIN - MODERATE


   Last Admin: 01/27/20 13:56 Dose:  2 tab


Albuterol/Ipratropium (Duoneb (Albuterol 2.5 Mg/Ipratropium 0.5 Mg))  1 neb INH 

Q4H PRN


   PRN Reason: SOB/WHEEZING


Alprazolam (Xanax Tab*)  0.25 mg PO BID PRN


   PRN Reason: ANXIETY


   Last Admin: 01/27/20 09:41 Dose:  0.25 mg


Buprenorphine (Butrans 15 Mcg Patch(Nf))  15 mcg TRANSDERM Q168H [facility]


   Last Admin: 01/22/20 13:04 Dose:  15 mcg


Cyanocobalamin (Vitamin B12 Tab*)  1,000 mcg PO DAILY [facility]


   Last Admin: 01/27/20 07:53 Dose:  1,000 mcg


Cyclobenzaprine HCl (Flexeril Tab*)  5 mg PO BID PRN


   PRN Reason: SPASMS


   Last Admin: 01/26/20 20:01 Dose:  5 mg


Duloxetine HCl (Cymbalta Cap*)  20 mg PO DAILY [facility]


   Last Admin: 01/27/20 10:25 Dose:  20 mg


Folic Acid (Folvite Tab*)  1 mg PO DAILY [facility]


   Last Admin: 01/27/20 07:53 Dose:  1 mg


Gabapentin (Neurontin Cap(*))  300 mg PO TID [facility]


   Last Admin: 01/27/20 13:56 Dose:  300 mg


Heparin Sodium (Porcine) (Heparin Vial(*))  5,000 units SUBCUT Q8HR [facility]


   Last Admin: 01/27/20 13:56 Dose:  5,000 units


Heparin Sodium (Porcine) (Heparin Flush Picc/Ml/Cvc(*))  1 - 3 ml FLUSH 0600,

1800 [facility]; Protocol


   Last Admin: 01/27/20 06:40 Dose:  3 ml


Aztreonam 2 gm/ Sodium (Chloride)  100 mls @ 100 mls/hr IV Q8H [facility]


   Last Admin: 01/27/20 12:09 Dose:  100 mls/hr


Levothyroxine Sodium (Synthroid Tab*)  112 mcg PO DAILY@0600 [facility]


   Last Admin: 01/27/20 06:42 Dose:  112 mcg


Methylprednisolone Sodium Succinate (Solu-Medrol 40 Mg)  40 mg IV Q12H [facility]


   Last Admin: 01/27/20 06:38 Dose:  40 mg


Metoprolol Tartrate (Lopressor Tab*)  50 mg PO BID [facility]


   Last Admin: 01/27/20 07:53 Dose:  50 mg


Pharmacy Profile Note (Buprenorph Patch Check Q Shift)  1 note FOLLOW UP 0700,

1900 [facility]


   Last Admin: 01/27/20 06:42 Dose:  1 note


Verapamil HCl (Calan Sr Cap*)  180 mg PO DAILY [facility]


   Last Admin: 01/27/20 07:53 Dose:  180 mg








 Vital Signs - 8 hr











  01/27/20 01/27/20 01/27/20





  07:45 07:53 09:41


 


Temperature 98.7 F  


 


Pulse Rate 76  


 


Respiratory 18 20 22





Rate   


 


Blood Pressure 178/100  





(mmHg)   


 


O2 Sat by Pulse 96  





Oximetry   














  01/27/20 01/27/20 01/27/20





  10:26 12:15 13:56


 


Temperature   


 


Pulse Rate   


 


Respiratory 20 20 20





Rate   


 


Blood Pressure   





(mmHg)   


 


O2 Sat by Pulse   





Oximetry   











Oxygen Devices in Use Now: Nasal Cannula


Appearance: Alert but looks very fatigued.  Sl up in bed.  Neutral affect. No 

cough during my visit.


Eyes: No Scleral Icterus


Respiratory: Clear to Percussion, - - Wheeze vs upper airway sound all fields


Cardiovascular: NL Sounds; No Murmurs; No JVD, RRR, No Edema, -


Extremities: No Edema, No Clubbing, Cyanosis, -


Skin: No Rash or Ulcers, No Nodules or Sclerosis, -


Neurological: Alert and Oriented x 3, NL Sensation


Result Diagrams: 


 01/27/20 06:30





 01/27/20 06:30


Microbiology and Other Data: 


 Microbiology











 01/21/20 18:58 Blood Culture - Final





 Blood Venous    No Growth Day 5


 


 01/21/20 15:22 Gram Stain - Final





 Sputum Expectorated Sputum Culture - Final





    Klebsiella  Pneumoniae





    Normal Graciela


 


 01/22/20 04:30 Legionella Urinary Antigen - Final





 Urine    Negative Legionella Antigen





 Streptococcus pneumoniae Ag Screen - Final





    Negative S. pneumo Antigen


 


 01/21/20 17:28 Nasal Screen MRSA (PCR) - Final





 Nasal    Mrsa Not Detected














Assess/Plan/Problems-Billing


Assessment: 


Mrs Gage is a 73yo F with PMH of rheumatoid arthritis, chronic pain, CAD, 

HTN, hypothyroidism, who presented to ED with dyspnea, found to have severe 

sepsis secondary to pneumonia.


Patient is admitted to ICU, will transfer care to Dr Joe. 


Will continue to monitor closely.








- Patient Problems


(1) Community acquired pneumonia


Current Visit: No   Status: Acute   Code(s): J18.9 - PNEUMONIA, UNSPECIFIED 

ORGANISM   SNOMED Code(s): 235149074


   Comment: 


Continue aztreonam, start prednsione taper 1/28.





   





(2) Hypothyroidism


Current Visit: No   Status: Acute   Code(s): E03.9 - HYPOTHYROIDISM, 

UNSPECIFIED   SNOMED Code(s): 06390989


   Comment: - Home levothyroxine, TSH wnl 10/28/2019.   





(3) Coronary artery disease


Current Visit: No   Status: Acute   Code(s): I25.10 - ATHSCL HEART DISEASE OF 

NATIVE CORONARY ARTERY W/O ANG PCTRS   SNOMED Code(s): 53973401


   Comment: Continue metoprolol.   





(4) Rheumatoid arthritis


Current Visit: No   Status: Acute   Code(s): M06.9 - RHEUMATOID ARTHRITIS, 

UNSPECIFIED   SNOMED Code(s): 67417763


   Comment: - MTX and hydroxychloroquine on hold due to infection.   





(5) Hypertension


Current Visit: No   Status: Acute   Code(s): I10 - ESSENTIAL (PRIMARY) 

HYPERTENSION   SNOMED Code(s): 75622986


   Comment: - Continue Verapamil, Metoprolol.

## 2020-01-28 VITALS — DIASTOLIC BLOOD PRESSURE: 68 MMHG | SYSTOLIC BLOOD PRESSURE: 131 MMHG

## 2020-01-28 RX ADMIN — AZTREONAM SCH MLS/HR: 2 INJECTION, POWDER, FOR SOLUTION INTRAMUSCULAR; INTRAVENOUS at 03:16

## 2020-01-28 RX ADMIN — HEPARIN SODIUM SCH UNITS: 5000 INJECTION INTRAVENOUS; SUBCUTANEOUS at 05:08

## 2020-01-28 RX ADMIN — GABAPENTIN SCH MG: 300 CAPSULE ORAL at 08:56

## 2020-01-28 RX ADMIN — ACETAMINOPHEN AND CODEINE PHOSPHATE PRN TAB: 300; 30 TABLET ORAL at 03:26

## 2020-01-28 RX ADMIN — ALPRAZOLAM PRN MG: 0.25 TABLET ORAL at 08:54

## 2020-01-28 RX ADMIN — METOPROLOL TARTRATE SCH MG: 50 TABLET, FILM COATED ORAL at 08:54

## 2020-01-28 RX ADMIN — DULOXETINE HYDROCHLORIDE SCH MG: 20 CAPSULE, DELAYED RELEASE ORAL at 08:56

## 2020-01-28 RX ADMIN — VERAPAMIL HYDROCHLORIDE SCH MG: 180 CAPSULE, EXTENDED RELEASE ORAL at 08:56

## 2020-01-28 RX ADMIN — GABAPENTIN SCH MG: 300 CAPSULE ORAL at 14:21

## 2020-01-28 RX ADMIN — LEVOTHYROXINE SODIUM SCH MCG: 112 TABLET ORAL at 05:08

## 2020-01-28 RX ADMIN — ACETAMINOPHEN AND CODEINE PHOSPHATE PRN TAB: 300; 30 TABLET ORAL at 08:54

## 2020-01-28 RX ADMIN — CYANOCOBALAMIN TAB 500 MCG SCH MCG: 500 TAB at 08:56

## 2020-01-28 RX ADMIN — FOLIC ACID SCH MG: 1 TABLET ORAL at 08:56

## 2020-01-28 RX ADMIN — Medication SCH ML: at 05:28

## 2020-01-28 RX ADMIN — CYCLOBENZAPRINE HYDROCHLORIDE PRN MG: 10 TABLET, FILM COATED ORAL at 08:54

## 2020-01-28 RX ADMIN — HEPARIN SODIUM SCH UNITS: 5000 INJECTION INTRAVENOUS; SUBCUTANEOUS at 14:21

## 2020-01-28 NOTE — PN
Progress Note





- Progress Note


Date of Service: 01/28/20


Note: 





Time spent on discharge including dexam of patient, discussion with pt, nurse, 

CM, review of EHR and preparation of discharge documents is 45 minutes.

## 2020-01-28 NOTE — TRS
CC:  Dr. Felix; Dr. Artie Corbett; [facility] *

 

DATE OF ADMISSION:  01/21/2020.

 

DATE OF TRANSFER:  01/28/2020.

 

HISTORY OF PRESENT ILLNESS:  This 74-year-old woman presented with malaise and 
shortness of breath.  She was found with CTA of the chest, she had diffuse 
consolidation of the right and left lower lung, and was felt to have pneumonia.

 

Her Methotrexate and Hydroxychloroquine were held during this hospital stay.  
She was started on Aztreonam and Levofloxacin in the emergency room.  She was 
continued on Aztreonam and later this was changed to Doxycycline.  She did well 
in the hospital; however, she has a very poor ambulation and is really confined 
to an electric wheelchair and was having difficulty with transfers and is going 
to a nursing home for further rehab.  She is on a Prednisone taper as well.

 

FINAL DIAGNOSES:

1.  Community-acquired pneumonia.

2.  Hypothyroidism.

3.  Coronary artery disease.

4.  Rheumatoid arthritis.

5.  Hypertension.

 

DISCHARGE MEDICATIONS:

1.  Vitamin B12 1,000 mcg daily.

2.  Doxycycline 100 mg b.i.d.

3.  Hydroxychloroquine 200 mg daily to start 02/03/2020.

4.  Nabumetone 500 mg t.i.d.

5.  Duloxetine DR 20 mg daily.

6.  Alprazolam 0.25 mg b.i.d. prn.

7.  Atorvastatin 40 mg daily.

8.  Methotrexate 5 mg every Wednesday to start 02/05/2020.

9.  Verapamil  mg daily.

10. Levothyroxine 112 mcg daily.

11. Omeprazole 20 mg daily.

12. Gabapentin 300 mg t.i.d.

13. Folic acid 1 mg daily.

14. Metoprolol Tartrate 50 mg b.i.d.

15. Calcium with vitamin D3 one b.i.d.

16. Potassium Chloride 10 mEq b.i.d.

17. Ibuprofen 400 mg every 8 hours prn.

18. Diclofenac 1% gel q.i.d. prn.

19. Acetaminophen with codeine 30 mg two tablets b.i.d. prn.

20. Cyclobenzaprine 5 mg b.i.d. prn.

21. Buprenorphine 15 mcg patch every 7 days.

22. Albuterol inhaler two puffs every 4 hours prn.

23. Simethicone 80 mg every 6 hours prn.

24. Tegaderm dressing weekly.

25. Magnesium Hydroxide/Aluminum Hydroxide/Simethicone suspension 15 ml every 4 
hours prn.

 

CONDITION ON DISCHARGE:  Stable.

 

DISPOSITION ON DISCHARGE:  Discharged to [facility].

 

 [id number]/CPS #: [id number]

MTDD